# Patient Record
Sex: MALE | Race: WHITE | Employment: OTHER | URBAN - METROPOLITAN AREA
[De-identification: names, ages, dates, MRNs, and addresses within clinical notes are randomized per-mention and may not be internally consistent; named-entity substitution may affect disease eponyms.]

---

## 2017-01-05 RX ORDER — GABAPENTIN 300 MG/1
CAPSULE ORAL
Qty: 180 CAP | Refills: 1 | Status: SHIPPED | OUTPATIENT
Start: 2017-01-05 | End: 2017-08-03 | Stop reason: SDUPTHER

## 2017-01-05 NOTE — TELEPHONE ENCOUNTER
Wife calling, says [facility] says they need to talk to us before they can fill. Please call them. Wife says it is for Mr Sommers and her also.

## 2017-03-03 ENCOUNTER — TELEPHONE (OUTPATIENT)
Dept: INTERNAL MEDICINE CLINIC | Age: 82
End: 2017-03-03

## 2017-03-03 RX ORDER — METHYLPREDNISOLONE 4 MG/1
4 TABLET ORAL
Qty: 1 DOSE PACK | Refills: 0 | Status: SHIPPED | OUTPATIENT
Start: 2017-03-03 | End: 2017-05-04 | Stop reason: ALTCHOICE

## 2017-03-03 NOTE — TELEPHONE ENCOUNTER
Spoke with patient, he stated he was moving stuff last week and injured his lower back. He is having lower back pain, has been using Advil. It was very difficult to get symptoms out of him, he just wanted to tell me that he has been seeing this chiropractor a long time and trusts what he says. Stated he saw his chiropractor and told him the Advil wasn't really helping, this is when the chiropractor recommended a medrol dose pack to him and said he should call us.

## 2017-03-03 NOTE — TELEPHONE ENCOUNTER
PT saw his chiropractor( DR Mariajose Panda # [phone]) today for his back- he recommended a Medrol Dose pack- can you prescribe this for him? [facility] on [address].

## 2017-03-07 ENCOUNTER — OFFICE VISIT (OUTPATIENT)
Dept: INTERNAL MEDICINE CLINIC | Age: 82
End: 2017-03-07

## 2017-03-07 ENCOUNTER — TELEPHONE (OUTPATIENT)
Dept: INTERNAL MEDICINE CLINIC | Age: 82
End: 2017-03-07

## 2017-03-07 VITALS
WEIGHT: 171 LBS | SYSTOLIC BLOOD PRESSURE: 128 MMHG | TEMPERATURE: 97.6 F | OXYGEN SATURATION: 99 % | HEART RATE: 66 BPM | BODY MASS INDEX: 23.85 KG/M2 | DIASTOLIC BLOOD PRESSURE: 78 MMHG

## 2017-03-07 DIAGNOSIS — K40.90 RIGHT INGUINAL HERNIA: Primary | ICD-10-CM

## 2017-03-07 DIAGNOSIS — M54.50 BILATERAL LOW BACK PAIN WITHOUT SCIATICA, UNSPECIFIED CHRONICITY: ICD-10-CM

## 2017-03-07 NOTE — MR AVS SNAPSHOT
Visit Information Date & Time Provider Department Dept. Phone Encounter #  
 3/7/2017 11:30 AM Prabha Souza Internist of [address] [place] [address] [phone] Your Appointments 9/5/2017  8:05 AM  
LAB with [facility] NURSE VISIT Internist of [facility] Appt Note: ov 1yr sarris; ov 1yr sarris [address], [facility] [address] [address] [place]  
  
   
 [address], [address] 9/12/2017  9:00 AM  
PHYSICAL with Ed MD James  
[facility] of [facility] Appt Note: rpe bs  
 [address] [facility], [facility] [address] [address] [place]  
  
   
 [address], [address] Upcoming Health Maintenance Date Due ZOSTER VACCINE AGE 60> 1/31/1990 GLAUCOMA SCREENING Q2Y 1/31/1995 DTaP/Tdap/Td series (1 - Tdap) 1/2/2005 INFLUENZA AGE 9 TO ADULT 8/1/2016 MEDICARE YEARLY EXAM 10/26/2016 Allergies as of 3/7/2017  Review Complete On: 3/7/2017 By: Kelley Waterman LPN Severity Noted Reaction Type Reactions Sulfa (Sulfonamide Antibiotics)   Side Effect Nausea Only Current Immunizations  Reviewed on 9/8/2015 Name Date Influenza High Dose Vaccine PF 10/26/2015  2:00 PM, 9/30/2014 Influenza Vaccine PF 10/22/2013 Influenza Vaccine Split 10/23/2012,  Incomplete Influenza Vaccine Whole 11/12/2010 Pneumococcal Conjugate (PCV-13) 9/8/2015  8:15 AM  
 Pneumococcal Vaccine (Unspecified Type) 1/1/2006 Td 1/1/2005 Not reviewed this visit Vitals BP Pulse Temp Weight(growth percentile) SpO2 BMI  
 128/78 66 97.6 °F (36.4 °C) (Oral) 171 lb (77.6 kg) 99% 23.85 kg/m2 Smoking Status Never Smoker Vitals History BMI and BSA Data Body Mass Index Body Surface Area  
 23.85 kg/m 2 1.97 m 2 Preferred Pharmacy Pharmacy Name Phone [facility]. Pasquale Blue, [address] [facility] [phone] Your Updated Medication List  
  
   
This list is accurate as of: 3/7/17 12:18 PM.  Always use your most recent med list. ADVIL 200 mg tablet Generic drug:  ibuprofen Take 400 mg by mouth every eight (8) hours as needed for Pain. FISH OIL 1,000 mg Cap Generic drug:  omega-3 fatty acids-vitamin e Take 1 Cap by mouth.  
  
 gabapentin 300 mg capsule Commonly known as:  NEURONTIN  
TAKE 1 CAPSULE TWICE A DAY  
  
 GLUCOSAMINE CHONDROITIN PLUS PO Take  by mouth.  
  
 methylPREDNISolone 4 mg tablet Commonly known as:  Henson Kaley Take 1 Tab by mouth Specific Days and Specific Times. Take as directed VITAMIN B-12 1,000 mcg tablet Generic drug:  cyanocobalamin Take 1,000 mcg by mouth daily. VITAMIN D3 1,000 unit Cap Generic drug:  cholecalciferol Take 2,000 Units by mouth. Introducing [facility] & HEALTH SERVICES! Dear Joanne Rodrigues: Thank you for requesting a [employer] account. Our records indicate that you already have an active [employer] account. You can access your account anytime at [email or web address] Did you know that you can access your hospital and ER discharge instructions at any time in [employer]? You can also review all of your test results from your hospital stay or ER visit. Additional Information If you have questions, please visit the Frequently Asked Questions section of the [employer] website at [email or web address] Remember, [employer] is NOT to be used for urgent needs. For medical emergencies, dial 911. Now available from your iPhone and Android! Please provide this summary of care documentation to your next provider. Your primary care clinician is listed as Tabatha Rolle. If you have any questions after today's visit, please call [phone].

## 2017-03-07 NOTE — PROGRESS NOTES
HPI/History  Makeda Avila is a 80 y.o.  male who presents for evaluation. Pt with hx of right inguinal hernia and s/p repair of left inguinal hernia. Right hernia usually without symptoms or rare if present. Noticed some slight discomfort for about a week but no other sxs. Seems to have improved but may now be slightly worse at night but asymptomatic in the day. No signs of strangulation. No other GI sxs. No fevers or constitutional sxs. Left side asymptomatic since repair. Pt also with hx of chronic intermittent back pain. Noticed a flare after house moving about 2 wks ago. Has seen his chiropractor and was helping but then flared during one of the sessions. Pt denies any radiation into the LEs. Denies incontinence, paresthesias, or other sxs. No urinary sxs suggesting stone/other. Pt started medrol dosepack ~3/4. Has taken tylenol and goodies prn. No other complaints. Patient Active Problem List   Diagnosis Code    Impotence of organic origin N52.9    Right bundle branch block I45.10    Colon polyp K63.5    Osteoporosis s/p compression fx L4, fx wrist  M81.0    Nephrolithiasis N20.0    Inguinal hernia, right K40.90    Microhematuria R31.29    Compression fracture of L4 lumbar vertebra ([address] S32.040A    Fracture of wrist, Right S62.109A    Left lower lobe pneumonia J18.9    Low back pain with left-sided sciatica M54.42     Past Medical History:   Diagnosis Date    B12 deficiency     Compression fracture of fourth lumbar vertebra (HCC)     Degenerative joint disease (DJD) of lumbar spine     Distal radial fracture, right wrist 02/2015    Fell on ice. s/p ORIF with right dorsal endoplate nail, two proximal screws, and four distal pegs. Dr. Dean Ibrahim.  Diverticulosis     Impotence of organic origin     Low back pain with left-sided sciatica     Nephrolithiasis     Osteoporosis     Treated with ibandronate for 4 years, completed 5/2013.      Proctalgia fugax     RBBB (right bundle branch block)      Past Surgical History:   Procedure Laterality Date    HX CATARACT REMOVAL      HX HERNIA REPAIR      left side     Social History     Social History    Marital status:      Spouse name: N/A    Number of children: N/A    Years of education: N/A     Occupational History    Not on file. Social History Main Topics    Smoking status: Never Smoker    Smokeless tobacco: Never Used    Alcohol use No    Drug use: No    Sexual activity: Not on file     Other Topics Concern    Not on file     Social History Narrative     History reviewed. No pertinent family history. Current Outpatient Prescriptions   Medication Sig    methylPREDNISolone (MEDROL DOSEPACK) 4 mg tablet Take 1 Tab by mouth Specific Days and Specific Times. Take as directed    gabapentin (NEURONTIN) 300 mg capsule TAKE 1 CAPSULE TWICE A DAY    ibuprofen (ADVIL) 200 mg tablet Take 400 mg by mouth every eight (8) hours as needed for Pain.  GLUC/CHND/OM3/DHA/EPA/FISH/STR (GLUCOSAMINE CHONDROITIN PLUS PO) Take  by mouth.  cyanocobalamin (VITAMIN B-12) 1,000 mcg tablet Take 1,000 mcg by mouth daily.  omega-3 fatty acids-vitamin e (FISH OIL) 1,000 mg Cap Take 1 Cap by mouth.  Cholecalciferol, Vitamin D3, (VITAMIN D) 1,000 unit Cap Take 2,000 Units by mouth. No current facility-administered medications for this visit. Allergies   Allergen Reactions    Sulfa (Sulfonamide Antibiotics) Nausea Only       Review of Systems  Significant findings included in HPI. Physical Examination  Visit Vitals    /78    Pulse 66    Temp 97.6 °F (36.4 °C) (Oral)    Wt 171 lb (77.6 kg)    SpO2 99%    BMI 23.85 kg/m2     General - Alert and in no acute distress. Pt appears well, comfortable, and in good spirits. Pleasant, engaging. Nontoxic. Not anxious, non-diaphoretic. Mental status - Appropriate mood, behavior, speech content, dress, and thought processes.   Pulm - No tachypnea, retractions, or cyanosis. Good respiratory effort. Cardiovascular - Normal rate, regular rhythm. Abdomen - Nondistended. Active bowel sounds. Soft, nontender. No guarding, rigidity, or rebound. Right inguinal hernia noted and bulging with valsalva but reducible and with no signs of strangulation. Nontender and no overlying changes, warmth, or discoloration. No other associated findings. Back - Indicates affected areas as lower paraspinal lumbar region bilat. No tenderness or palpable findings currently. No apparent discomfort with or without ROM. Good ROM. No other findings. Assessment and Plan  1. Right inguinal hernia - Relatively asymptomatic at baseline but mild discomfort for about a week but improving. No current signs of strangulation or other ominous developments but will refer to Dr. Jacob Piedra for eval. Supportive measures for now and return/call or visit ED if acute developments. 2. Low back pain withOUT sciatica - Complete steroid taper. Avoid NSAIDs and ASA for now. Can use tylenol if needed. Discussed stronger analgesics and adverse effects but will hold off on this for as long as possible. Revisited other conservative measures at length. Can continue with chiropractor but avoid the procedures at recent session and have lighter manipulation/tx in general. May consider PT in the future. Further planning pending pt progression. Pt happily agrees with plan. More than 25 mins spent during visit with more than 50% spent discussing above issues, potential causes/contributing factors, eval/tx, med considerations, plan, and questions. PLEASE NOTE:   This document has been produced using voice recognition software. Unrecognized errors in transcription may be present.     [facility] of [address] [place] [address]  [phone]  3/7/2017

## 2017-03-07 NOTE — TELEPHONE ENCOUNTER
Pt here today for ov with Yolande Prince. Asking if there is any way RD would take him on as new pt. Says Dr. Ila Quigley had told him RD would be able to see him. He has seen Dr. Martín Bailey but really just feels more comfortable with a male doctor. He was scheduled in error to see RD in Sept.     Any way you would take him on so he doesn't have to go out of the practice?

## 2017-03-29 ENCOUNTER — OFFICE VISIT (OUTPATIENT)
Dept: SURGERY | Age: 82
End: 2017-03-29

## 2017-03-29 VITALS
DIASTOLIC BLOOD PRESSURE: 70 MMHG | BODY MASS INDEX: 23.94 KG/M2 | HEIGHT: 71 IN | RESPIRATION RATE: 18 BRPM | WEIGHT: 171 LBS | SYSTOLIC BLOOD PRESSURE: 118 MMHG

## 2017-03-29 DIAGNOSIS — K40.91 UNILATERAL RECURRENT INGUINAL HERNIA WITHOUT OBSTRUCTION OR GANGRENE: Primary | ICD-10-CM

## 2017-03-29 NOTE — PROGRESS NOTES
Progress Note    Patient: Mj Joshi  MRN: [id number]  SSN: [id number]   YOB: 1930  Age: 80 y.o. Sex: male     Chief Complaint   Patient presents with    Inguinal Hernia       HPI    Mr Lyubov Segal is an 81 y/o man with at least a 10 year hx of a right Ing hernia. This is largely asymptomatic and only rarely hurts. It is mildly symp after heavy work but only a time or two in the last decade. The hernia is small and easy to reduce. We've discussed what to do with it and together concluded that we will wait on any repair until it gets bigger or causes more symps. Past Medical History:   Diagnosis Date    B12 deficiency     Compression fracture of fourth lumbar vertebra (HCC)     Degenerative joint disease (DJD) of lumbar spine     Distal radial fracture, right wrist 02/2015    Fell on ice. s/p ORIF with right dorsal endoplate nail, two proximal screws, and four distal pegs. Dr. Victoria Mathur.  Diverticulosis     Impotence of organic origin     Low back pain with left-sided sciatica     Nephrolithiasis     Osteoporosis     Treated with ibandronate for 4 years, completed 5/2013.  Proctalgia fugax     RBBB (right bundle branch block)      Past Surgical History:   Procedure Laterality Date    HX CATARACT REMOVAL      HX HERNIA REPAIR      left side     Allergies   Allergen Reactions    Sulfa (Sulfonamide Antibiotics) Nausea Only     Current Outpatient Prescriptions   Medication Sig Dispense Refill    methylPREDNISolone (MEDROL DOSEPACK) 4 mg tablet Take 1 Tab by mouth Specific Days and Specific Times. Take as directed 1 Dose Pack 0    gabapentin (NEURONTIN) 300 mg capsule TAKE 1 CAPSULE TWICE A  Cap 1    ibuprofen (ADVIL) 200 mg tablet Take 400 mg by mouth every eight (8) hours as needed for Pain.  GLUC/CHND/OM3/DHA/EPA/FISH/STR (GLUCOSAMINE CHONDROITIN PLUS PO) Take  by mouth.  cyanocobalamin (VITAMIN B-12) 1,000 mcg tablet Take 1,000 mcg by mouth daily.         omega-3 fatty acids-vitamin e (FISH OIL) 1,000 mg Cap Take 1 Cap by mouth.  Cholecalciferol, Vitamin D3, (VITAMIN D) 1,000 unit Cap Take 2,000 Units by mouth. Social History     Social History    Marital status:      Spouse name: N/A    Number of children: N/A    Years of education: N/A     Occupational History    Not on file. Social History Main Topics    Smoking status: Never Smoker    Smokeless tobacco: Never Used    Alcohol use No    Drug use: No    Sexual activity: Not on file     Other Topics Concern    Not on file     Social History Narrative     History reviewed. No pertinent family history. Review of systems:  Patient denies any reflux, emesis, abdominal pain, change in bowel habits, hematochezia, melena, fever, weight loss, fatigue chills, dermatitis, abnormal moles, change in vision, vertigo, epistaxis, dysphagia, hoarseness, chest pain, palpitations, hypertension, edema, cough, shortness of breath, wheezing, hemoptysis, snoring, hematuria, diabetes, thyroid disease, anemia, bruising, history of blood transfusion, dizziness, headache, or fainting.     Physical Examination    Well developed well nourished male in no apparent distress  Visit Vitals    /70    Resp 18    Ht 5' 11\" (1.803 m)    Wt 77.6 kg (171 lb)    BMI 23.85 kg/m2      Head: normocephalic, atraumatic  Mouth: Clear, no overt lesions, oral mucosa pink and moist  Neck: supple, no masses, no adenopathy or carotid bruits, trachea midline  Resp: clear to auscultation bilaterally, no wheeze, rhonchi or rales, excursions normal and symmetrical  Cardio: Regular rate and rhythm, no murmurs, clicks, gallops or rubs, no edema or varicosities  Abdomen: soft, nontender, nondistended, normoactive bowel sounds, no hernias, no hepatosplenomegaly,   Back: na  Extremeties: warm, well-perfused, no tenderness or swelling, normal gait/station  Neuro: sensation and strength grossly intact and symmetrical  Psych: alert and oriented to person, place and time  Breast exam na    IMPRESSION  Small largly asymptomatic [facility]    PLAN  No orders of the defined types were placed in this encounter.     F/u 6 months  Melba Cardona MD

## 2017-03-29 NOTE — MR AVS SNAPSHOT
Visit Information Date & Time Provider Department Dept. Phone Encounter #  
 3/29/2017 10:00 AM Royal Suarez MD [facility] [phone] [id number] Your Appointments 9/5/2017  8:05 AM  
LAB with [facility] NURSE VISIT Internist of [facility] ([facility]) Appt Note: ov 1yr sarris; ov 1yr sarris [address], [address] [facility] [address] [address] [place]  
  
   
 [address], [address] 9/12/2017  9:00 AM  
PHYSICAL with Kam Méndez MD  
Internist of [facility]) Appt Note: rpe bs  
 [address] [facility], [address] [facility] [address] [place]  
  
   
 [address], [address]  
  
    
 9/29/2017 10:00 AM  
Follow Up with Royal Suarez MD  
[address] ([facility]) Appt Note: 6 month fu hernia [address] [facility] [address]  
[phone]  
  
   
 [address] [address] Upcoming Health Maintenance Date Due ZOSTER VACCINE AGE 60> 1/31/1990 GLAUCOMA SCREENING Q2Y 1/31/1995 DTaP/Tdap/Td series (1 - Tdap) 1/2/2005 INFLUENZA AGE 9 TO ADULT 8/1/2016 MEDICARE YEARLY EXAM 10/26/2016 Allergies as of 3/29/2017  Review Complete On: 3/29/2017 By: Elijah Bah LPN Severity Noted Reaction Type Reactions Sulfa (Sulfonamide Antibiotics)   Side Effect Nausea Only Current Immunizations  Reviewed on 9/8/2015 Name Date Influenza High Dose Vaccine PF 10/26/2015  2:00 PM, 9/30/2014 Influenza Vaccine PF 10/22/2013 Influenza Vaccine Split 10/23/2012,  Incomplete Influenza Vaccine Whole 11/12/2010 Pneumococcal Conjugate (PCV-13) 9/8/2015  8:15 AM  
 Pneumococcal Vaccine (Unspecified Type) 1/1/2006 Td 1/1/2005 Not reviewed this visit Vitals BP Resp Height(growth percentile) Weight(growth percentile) BMI Smoking Status 118/70 18 5' 11\" (1.803 m) 171 lb (77.6 kg) 23.85 kg/m2 Never Smoker BMI and BSA Data Body Mass Index Body Surface Area  
 23.85 kg/m 2 1.97 m 2 Preferred Pharmacy [facility] Phone [id number] [facility], [address] [facility] [phone] Your Updated Medication List  
  
   
This list is accurate as of: 3/29/17 10:45 AM.  Always use your most recent med list. ADVIL 200 mg tablet Generic drug:  ibuprofen Take 400 mg by mouth every eight (8) hours as needed for Pain. FISH OIL 1,000 mg Cap Generic drug:  omega-3 fatty acids-vitamin e Take 1 Cap by mouth.  
  
 gabapentin 300 mg capsule Commonly known as:  NEURONTIN  
TAKE 1 CAPSULE TWICE A DAY  
  
 GLUCOSAMINE CHONDROITIN PLUS PO Take  by mouth.  
  
 methylPREDNISolone 4 mg tablet Commonly known as:  Henson Kaley Take 1 Tab by mouth Specific Days and Specific Times. Take as directed VITAMIN B-12 1,000 mcg tablet Generic drug:  cyanocobalamin Take 1,000 mcg by mouth daily. VITAMIN D3 1,000 unit Cap Generic drug:  cholecalciferol Take 2,000 Units by mouth. Patient Instructions Call the office at [phone] if you have any questions or concerns. Introducing [facility] & HEALTH SERVICES! Deaedward Rodrigues: Thank you for requesting a [employer] account. Our records indicate that you already have an active [employer] account. You can access your account anytime at [email or web address] Did you know that you can access your hospital and ER discharge instructions at any time in [employer]? You can also review all of your test results from your hospital stay or ER visit. Additional Information If you have questions, please visit the Frequently Asked Questions section of the [employer] website at [email or web address]/. Remember, [employer] is NOT to be used for urgent needs. For medical emergencies, dial 911. Now available from your iPhone and Android! Please provide this summary of care documentation to your next provider. Your primary care clinician is listed as Ry Avila. If you have any questions after today's visit, please call [phone].

## 2017-05-04 ENCOUNTER — OFFICE VISIT (OUTPATIENT)
Dept: INTERNAL MEDICINE CLINIC | Age: 82
End: 2017-05-04

## 2017-05-04 VITALS
BODY MASS INDEX: 23.94 KG/M2 | DIASTOLIC BLOOD PRESSURE: 62 MMHG | HEART RATE: 60 BPM | HEIGHT: 71 IN | WEIGHT: 171 LBS | SYSTOLIC BLOOD PRESSURE: 96 MMHG | TEMPERATURE: 98.2 F | OXYGEN SATURATION: 97 %

## 2017-05-04 DIAGNOSIS — R42 DIZZINESS: Primary | ICD-10-CM

## 2017-05-04 RX ORDER — MECLIZINE HYDROCHLORIDE 25 MG/1
25 TABLET ORAL
Qty: 30 TAB | Refills: 0 | Status: SHIPPED | OUTPATIENT
Start: 2017-05-04 | End: 2020-07-02 | Stop reason: SDUPTHER

## 2017-05-04 NOTE — PROGRESS NOTES
1. Have you been to the ER, urgent care clinic or hospitalized since your last visit? NO.     2. Have you seen or consulted any other health care providers outside of the [facility] since your last visit (Include any pap smears or colon screening)? NO      Do you have an Advanced Directive? NO    Would you like information on Advanced Directives?  YES

## 2017-05-04 NOTE — MR AVS SNAPSHOT
Visit Information Date & Time Provider Department Dept. Phone Encounter #  
 5/4/2017  2:30 PM Stefanie Cantu MD Internist of [address] [place] [address] [phone] [id number] Your Appointments 9/5/2017  8:05 AM  
LAB with [facility] NURSE VISIT Internist of [facility] ([facility]) Appt Note: ov 1yr sarris; ov 1yr sarris [address], [address] [facility] [address]  
  
   
 [address], [address] 9/12/2017  9:00 AM  
PHYSICAL with Stefanie Cantu MD  
Internist of [facility]) Appt Note: rpe bs  
 [address] [facility], [address] [facility] [address]  
  
   
 [address], [address]  
  
    
 9/29/2017 10:00 AM  
Follow Up with Rachele Macias MD  
[address] ([facility]) Appt Note: 6 month fu hernia [address] [facility] [address]  
[phone]  
  
   
 [address] [address] Upcoming Health Maintenance Date Due ZOSTER VACCINE AGE 60> 1/31/1990 GLAUCOMA SCREENING Q2Y 1/31/1995 DTaP/Tdap/Td series (1 - Tdap) 1/2/2005 MEDICARE YEARLY EXAM 10/26/2016 INFLUENZA AGE 9 TO ADULT 8/1/2017 Allergies as of 5/4/2017  Review Complete On: 3/29/2017 By: Rachele Macias MD  
  
 Severity Noted Reaction Type Reactions Sulfa (Sulfonamide Antibiotics)   Side Effect Nausea Only Current Immunizations  Reviewed on 9/8/2015 Name Date Influenza High Dose Vaccine PF 10/26/2015  2:00 PM, 9/30/2014 Influenza Vaccine PF 10/22/2013 Influenza Vaccine Split 10/23/2012,  Incomplete Influenza Vaccine Whole 11/12/2010 Pneumococcal Conjugate (PCV-13) 9/8/2015  8:15 AM  
 Pneumococcal Vaccine (Unspecified Type) 1/1/2006 Td 1/1/2005 Not reviewed this visit You Were Diagnosed With   
  
 Codes Comments  Dizziness    -  Primary ICD-10-CM: L85 
 ICD-9-CM: 780.4 Vitals BP Pulse Temp Height(growth percentile) Weight(growth percentile) SpO2  
 96/62 60 98.2 °F (36.8 °C) (Oral) 5' 11\" (1.803 m) 171 lb (77.6 kg) 97% BMI Smoking Status 23.85 kg/m2 Never Smoker Vitals History BMI and BSA Data Body Mass Index Body Surface Area  
 23.85 kg/m 2 1.97 m 2 Preferred Pharmacy Pharmacy Name Phone [facility] [address] [facility] [phone] Your Updated Medication List  
  
   
This list is accurate as of: 5/4/17  3:20 PM.  Always use your most recent med list. ADVIL 200 mg tablet Generic drug:  ibuprofen Take 400 mg by mouth every eight (8) hours as needed for Pain. FISH OIL 1,000 mg Cap Generic drug:  omega-3 fatty acids-vitamin e Take 1 Cap by mouth.  
  
 gabapentin 300 mg capsule Commonly known as:  NEURONTIN  
TAKE 1 CAPSULE TWICE A DAY  
  
 GLUCOSAMINE CHONDROITIN PLUS PO Take  by mouth.  
  
 meclizine 25 mg tablet Commonly known as:  ANTIVERT Take 1 Tab by mouth three (3) times daily as needed. VITAMIN B-12 1,000 mcg tablet Generic drug:  cyanocobalamin Take 1,000 mcg by mouth daily. VITAMIN D3 1,000 unit Cap Generic drug:  cholecalciferol Take 2,000 Units by mouth. Prescriptions Sent to Pharmacy Refills  
 meclizine (ANTIVERT) 25 mg tablet 0 Sig: Take 1 Tab by mouth three (3) times daily as needed. Class: Normal  
 Pharmacy: [id number] Ph #: [phone] Route: Oral  
  
Introducing [facility] [place] [facility]! Dear Gelacio Lares: Thank you for requesting a [employer] account. Our records indicate that you already have an active [employer] account. You can access your account anytime at [email or web address]. [email or web address] Did you know that you can access your hospital and ER discharge instructions at any time in [employer]? You can also review all of your test results from your hospital stay or ER visit. Additional Information If you have questions, please visit the Frequently Asked Questions section of the [employer] website at [email or web address] Remember, [employer] is NOT to be used for urgent needs. For medical emergencies, dial 911. Now available from your iPhone and Android! Please provide this summary of care documentation to your next provider. Your primary care clinician is listed as Kaitlyn Jamil. If you have any questions after today's visit, please call [phone].

## 2017-05-04 NOTE — PROGRESS NOTES
80 y.o. WHITE OR  male who presents for evaluation. He is here c/o about a 1 month h/o intermittent dizziness. Happens mostly when he's getting in or out of bed, describes it as a spinning sensation mostly w mild nausea without actual vomiting. Does not recall prior h/o vertigo, this happened a few days after he saw Dr Sam Ash for was removal apparently. No new hearing loss or tinnitus although he has baseline level for both, no ear pain. Denied any focal neuro complaints. Denied any cp, sob, palpitations, syncope, presyncope    Past Medical History:   Diagnosis Date    B12 deficiency     Closed compression fracture of L4 lumbar vertebra (HCC)     Colon polyps     Dr Maya Boyd    Degenerative joint disease (DJD) of lumbar spine     MRI 11/14 w multilevel disease; chronic lbp w sciatica saw Dr Carri Baltazar    Distal radial fracture, right wrist 02/2015    Fell on ice. s/p ORIF with right dorsal endoplate nail, two proximal screws, and four distal pegs. Dr. Rashaad Goins.  Diverticulosis     on CT 4/16    Erectile dysfunction     Nephrolithiasis 04/2016    right tiny stone on CT; microhematuria    Osteoporosis     ibandronate 2009-5/13;  DEXA t score -2.4 spibe, -2.1 hip (8/11); -2.3 spine, -1.9 hip (8/13); -2.4 spine, -2.4 hip (9/15)    Proctalgia fugax     RBBB (right bundle branch block)      Past Surgical History:   Procedure Laterality Date    HX CATARACT REMOVAL      HX COLONOSCOPY      Dr Maya Boyd 2012 negative    HX HERNIA REPAIR      left side     Social History     Social History    Marital status:      Spouse name: N/A    Number of children: N/A    Years of education: N/A     Occupational History    Not on file.      Social History Main Topics    Smoking status: Never Smoker    Smokeless tobacco: Never Used    Alcohol use No    Drug use: No    Sexual activity: Not on file     Other Topics Concern    Not on file     Social History Narrative     Current Outpatient Prescriptions   Medication Sig    meclizine (ANTIVERT) 25 mg tablet Take 1 Tab by mouth three (3) times daily as needed.  gabapentin (NEURONTIN) 300 mg capsule TAKE 1 CAPSULE TWICE A DAY    ibuprofen (ADVIL) 200 mg tablet Take 400 mg by mouth every eight (8) hours as needed for Pain.  GLUC/CHND/OM3/DHA/EPA/FISH/STR (GLUCOSAMINE CHONDROITIN PLUS PO) Take  by mouth.  cyanocobalamin (VITAMIN B-12) 1,000 mcg tablet Take 1,000 mcg by mouth daily.  omega-3 fatty acids-vitamin e (FISH OIL) 1,000 mg Cap Take 1 Cap by mouth.  Cholecalciferol, Vitamin D3, (VITAMIN D) 1,000 unit Cap Take 2,000 Units by mouth. No current facility-administered medications for this visit. Allergies   Allergen Reactions    Sulfa (Sulfonamide Antibiotics) Nausea Only     Visit Vitals    BP 96/62    Pulse 60    Temp 98.2 °F (36.8 °C) (Oral)    Ht 5' 11\" (1.803 m)    Wt 171 lb (77.6 kg)    SpO2 97%    BMI 23.85 kg/m2   tm wnl, sinuses nt, op benign, no nodes. No bruits heard. Heart showed rrr, lungs cta, abd soft and nt. Neuro nonfocal.  Tony-hallpike negative    LABS  From 9/14 showed gluc 90,   cr 1.43, gfr 47,  alt<5, chol 149, tg 56, hdl 59, ldl-c 79, wbc 5.7, hb 13.2, plt 170, ua tr bl, vit d 46.4  From 2/15 showed gluc 114, cr 1.06, gfr>60, alt 17,        wbc 6.9, hb 12.9, plt 187  From 9/15 showed gluc 89,   cr 0.93, gfr 75,  alt 13, chol 165, tg 92, hdl 66, ldl-c 81, wbc 5.8, hb 14.0, plt 198, ua tr bl, vit d 47.3, b12 486  From 8/16 showed gluc 148, cr 1.16, gfr 60,  alt 20,                 ck/trop neg  From 8/16 showed gluc 80,   cr 0.97, gfr>60, alt 28, chol 118, tg 71, hdl 46, ldl-c 58, wbc 5.7, hb 12.5, plt 341, ua tr bl, vit d 40.9, tsh 1.04    Assessment and plan:  1. Probable vertigo. Quick discussion. Suggested prn antivert, Dr Linsey Waters if no better or worsening sx        Above conditions discussed at length and patient vocalized understanding.   All questions answered to patient satifaction

## 2017-08-03 RX ORDER — GABAPENTIN 300 MG/1
CAPSULE ORAL
Qty: 180 CAP | Refills: 1 | Status: SHIPPED | OUTPATIENT
Start: 2017-08-03 | End: 2018-01-08 | Stop reason: SDUPTHER

## 2017-09-05 ENCOUNTER — HOSPITAL ENCOUNTER (OUTPATIENT)
Dept: LAB | Age: 82
Discharge: HOME | End: 2017-09-05
Payer: MEDICARE

## 2017-09-05 DIAGNOSIS — K40.90 UNILATERAL INGUINAL HERNIA WITHOUT OBSTRUCTION OR GANGRENE, RECURRENCE NOT SPECIFIED: ICD-10-CM

## 2017-09-05 DIAGNOSIS — M54.42 CHRONIC BILATERAL LOW BACK PAIN WITH LEFT-SIDED SCIATICA: ICD-10-CM

## 2017-09-05 DIAGNOSIS — M81.0 OSTEOPOROSIS: ICD-10-CM

## 2017-09-05 DIAGNOSIS — G89.29 CHRONIC BILATERAL LOW BACK PAIN WITH LEFT-SIDED SCIATICA: ICD-10-CM

## 2017-09-05 DIAGNOSIS — I45.10 RIGHT BUNDLE BRANCH BLOCK: ICD-10-CM

## 2017-09-05 DIAGNOSIS — J18.9 PNEUMONIA OF LEFT LOWER LOBE DUE TO INFECTIOUS ORGANISM: ICD-10-CM

## 2017-09-05 LAB
ALBUMIN SERPL-MCNC: 3.8 G/DL (ref 3.4–5)
ALBUMIN/GLOB SERPL: 1.3 {RATIO} (ref 0.8–1.7)
ALP SERPL-CCNC: 69 U/L (ref 45–117)
ALT SERPL-CCNC: 20 U/L (ref 16–61)
ANION GAP SERPL CALC-SCNC: 6 MMOL/L (ref 3–18)
APPEARANCE UR: CLEAR
AST SERPL-CCNC: 17 U/L (ref 15–37)
BACTERIA URNS QL MICRO: NEGATIVE /HPF
BASOPHILS # BLD: 0 K/UL (ref 0–0.06)
BASOPHILS NFR BLD: 0 % (ref 0–2)
BILIRUB SERPL-MCNC: 0.5 MG/DL (ref 0.2–1)
BILIRUB UR QL: NEGATIVE
BUN SERPL-MCNC: 16 MG/DL (ref 7–18)
BUN/CREAT SERPL: 16 (ref 12–20)
CALCIUM SERPL-MCNC: 8.9 MG/DL (ref 8.5–10.1)
CHLORIDE SERPL-SCNC: 106 MMOL/L (ref 100–108)
CHOLEST SERPL-MCNC: 152 MG/DL
CO2 SERPL-SCNC: 29 MMOL/L (ref 21–32)
COLOR UR: YELLOW
CREAT SERPL-MCNC: 1.01 MG/DL (ref 0.6–1.3)
DIFFERENTIAL METHOD BLD: ABNORMAL
EOSINOPHIL # BLD: 0.1 K/UL (ref 0–0.4)
EOSINOPHIL NFR BLD: 2 % (ref 0–5)
EPITH CASTS URNS QL MICRO: NORMAL /LPF (ref 0–5)
ERYTHROCYTE [DISTWIDTH] IN BLOOD BY AUTOMATED COUNT: 14.4 % (ref 11.6–14.5)
GLOBULIN SER CALC-MCNC: 2.9 G/DL (ref 2–4)
GLUCOSE SERPL-MCNC: 89 MG/DL (ref 74–99)
GLUCOSE UR STRIP.AUTO-MCNC: NEGATIVE MG/DL
HCT VFR BLD AUTO: 42.5 % (ref 36–48)
HDLC SERPL-MCNC: 74 MG/DL (ref 40–60)
HDLC SERPL: 2.1 {RATIO} (ref 0–5)
HGB BLD-MCNC: 13.7 G/DL (ref 13–16)
HGB UR QL STRIP: NEGATIVE
KETONES UR QL STRIP.AUTO: NEGATIVE MG/DL
LDLC SERPL CALC-MCNC: 62.8 MG/DL (ref 0–100)
LEUKOCYTE ESTERASE UR QL STRIP.AUTO: NEGATIVE
LIPID PROFILE,FLP: ABNORMAL
LYMPHOCYTES # BLD: 2.5 K/UL (ref 0.9–3.6)
LYMPHOCYTES NFR BLD: 45 % (ref 21–52)
MCH RBC QN AUTO: 30.6 PG (ref 24–34)
MCHC RBC AUTO-ENTMCNC: 32.2 G/DL (ref 31–37)
MCV RBC AUTO: 95.1 FL (ref 74–97)
MONOCYTES # BLD: 0.7 K/UL (ref 0.05–1.2)
MONOCYTES NFR BLD: 13 % (ref 3–10)
NEUTS SEG # BLD: 2.2 K/UL (ref 1.8–8)
NEUTS SEG NFR BLD: 40 % (ref 40–73)
NITRITE UR QL STRIP.AUTO: NEGATIVE
PH UR STRIP: 7.5 [PH] (ref 5–8)
PLATELET # BLD AUTO: 195 K/UL (ref 135–420)
PMV BLD AUTO: 10.2 FL (ref 9.2–11.8)
POTASSIUM SERPL-SCNC: 4.5 MMOL/L (ref 3.5–5.5)
PROT SERPL-MCNC: 6.7 G/DL (ref 6.4–8.2)
PROT UR STRIP-MCNC: NEGATIVE MG/DL
RBC # BLD AUTO: 4.47 M/UL (ref 4.7–5.5)
RBC #/AREA URNS HPF: 0 /HPF (ref 0–5)
SODIUM SERPL-SCNC: 141 MMOL/L (ref 136–145)
SP GR UR REFRACTOMETRY: 1.01 (ref 1–1.03)
TRIGL SERPL-MCNC: 76 MG/DL (ref ?–150)
TSH SERPL DL<=0.05 MIU/L-ACNC: 1.32 UIU/ML (ref 0.36–3.74)
UROBILINOGEN UR QL STRIP.AUTO: 0.2 EU/DL (ref 0.2–1)
VLDLC SERPL CALC-MCNC: 15.2 MG/DL
WBC # BLD AUTO: 5.5 K/UL (ref 4.6–13.2)
WBC URNS QL MICRO: NORMAL /HPF (ref 0–4)

## 2017-09-05 PROCEDURE — 36415 COLL VENOUS BLD VENIPUNCTURE: CPT | Performed by: INTERNAL MEDICINE

## 2017-09-05 PROCEDURE — 82306 VITAMIN D 25 HYDROXY: CPT | Performed by: INTERNAL MEDICINE

## 2017-09-05 PROCEDURE — 85025 COMPLETE CBC W/AUTO DIFF WBC: CPT | Performed by: INTERNAL MEDICINE

## 2017-09-05 PROCEDURE — 80061 LIPID PANEL: CPT | Performed by: INTERNAL MEDICINE

## 2017-09-05 PROCEDURE — 84443 ASSAY THYROID STIM HORMONE: CPT | Performed by: INTERNAL MEDICINE

## 2017-09-05 PROCEDURE — 80053 COMPREHEN METABOLIC PANEL: CPT | Performed by: INTERNAL MEDICINE

## 2017-09-05 PROCEDURE — 81001 URINALYSIS AUTO W/SCOPE: CPT | Performed by: INTERNAL MEDICINE

## 2017-09-06 ENCOUNTER — TELEPHONE (OUTPATIENT)
Dept: INTERNAL MEDICINE CLINIC | Age: 82
End: 2017-09-06

## 2017-09-06 LAB — 25(OH)D3 SERPL-MCNC: 52 NG/ML (ref 30–100)

## 2017-09-06 NOTE — TELEPHONE ENCOUNTER
Leticia Singer at [facility] lab is calling. Medicare will not pay for Lipids with current dx. Asking if there is another Dx code we can use.

## 2017-09-07 NOTE — TELEPHONE ENCOUNTER
Probably not for medicare. .. Linda Sher Is there anything abnormal about his lipid panel or family hx cardiac issues. .etc?

## 2017-09-07 NOTE — TELEPHONE ENCOUNTER
Order written and at MR to fax to sonia. ..  Sorangely Guerrero, I have no idea why I sent this to you!! =)

## 2017-09-09 NOTE — PROGRESS NOTES
This is a Subsequent Medicare Annual Wellness Exam     I have reviewed the patient's medical history in detail and updated the computerized patient record. History     Past Medical History:   Diagnosis Date    B12 deficiency     Closed compression fracture of L4 lumbar vertebra (HCC)     Colon polyps     Dr Heidy Morel    Degenerative joint disease (DJD) of lumbar spine     MRI 11/14 w multilevel disease; chronic lbp w sciatica saw Dr Jos Ashley    Distal radial fracture, right wrist 02/2015    Fell on ice. s/p ORIF with right dorsal endoplate nail, two proximal screws, and four distal pegs. Dr. Armstrong Floor.  Diverticulosis     on CT 4/16    Erectile dysfunction     Nephrolithiasis 04/2016    right tiny stone on CT; microhematuria    Osteoporosis     ibandronate 2009-5/13 stopped due to tooth issues per his endodontist;  DEXA t score -2.4 spibe, -2.1 hip (8/11); -2.3 spine, -1.9 hip (8/13); -2.4 spine, -2.4 hip (9/15)    Proctalgia fugax     RBBB (right bundle branch block)     Right inguinal hernia     Dr Patricia Mohr      Past Surgical History:   Procedure Laterality Date    Matheus Hint      Dr Heidy Morel 2012 negative    HX HERNIA REPAIR  2002    [facility] repair     Current Outpatient Prescriptions   Medication Sig Dispense Refill    gabapentin (NEURONTIN) 300 mg capsule TAKE 1 CAPSULE TWICE A  Cap 1    ibuprofen (ADVIL) 200 mg tablet Take 400 mg by mouth every eight (8) hours as needed for Pain.  GLUC/CHND/OM3/DHA/EPA/FISH/STR (GLUCOSAMINE CHONDROITIN PLUS PO) Take  by mouth.  cyanocobalamin (VITAMIN B-12) 1,000 mcg tablet Take 1,000 mcg by mouth daily.  omega-3 fatty acids-vitamin e (FISH OIL) 1,000 mg Cap Take 1 Cap by mouth.  Cholecalciferol, Vitamin D3, (VITAMIN D) 1,000 unit Cap Take 2,000 Units by mouth.  meclizine (ANTIVERT) 25 mg tablet Take 1 Tab by mouth three (3) times daily as needed.  30 Tab 0     Allergies   Allergen Reactions    Sulfa (Sulfonamide Antibiotics) Nausea Only     No family history on file. Social History   Substance Use Topics    Smoking status: Never Smoker    Smokeless tobacco: Never Used    Alcohol use No     Depression Risk Factor Screening:     PHQ over the last two weeks 9/12/2017   Little interest or pleasure in doing things Not at all   Feeling down, depressed or hopeless Not at all   Total Score PHQ 2 0     SCREENINGS  Colonoscopy last done 2012 Dr Roberts Reading  Prostate NINA done  PSA done    Immunization History   Administered Date(s) Administered    Influenza High Dose Vaccine PF 09/30/2014, 10/26/2015    Influenza Vaccine PF 10/22/2013    Influenza Vaccine Split 10/23/2012    Influenza Vaccine Whole 11/12/2010    Pneumococcal Conjugate (PCV-13) 09/08/2015    Pneumococcal Vaccine (Unspecified Type) 01/01/2006    Td 01/01/2005     Alcohol Risk Factor Screening: You do not drink alcohol or very rarely. Functional Ability and Level of Safety:   Hearing Loss  Hearing is good. Activities of Daily Living  The home contains: no safety equipment  Patient does total self care    Fall Risk  Fall Risk Assessment, last 12 mths 9/12/2017   Able to walk? Yes   Fall in past 12 months?  No   Fall with injury? -   Number of falls in past 12 months -   Fall Risk Score -       Abuse Screen  Patient is not abused    Cognitive Screening   Evaluation of Cognitive Function:  Has your family/caregiver stated any concerns about your memory: no  Normal    Patient Care Team   Patient Care Team:  Cara Swann MD as PCP - General (Internal Medicine)  Daniel Patel, RN as Ambulatory Care Navigator (Internal Medicine)  Larita Dakins, RN as Ambulatory Care Navigator (Internal Medicine)    Assessment/Plan   Education and counseling provided:  Are appropriate based on today's review and evaluation  End-of-Life planning (with patient's consent)  Influenza Vaccine  Cardiovascular screening blood test  Diabetes screening test   End of life discussion undertaken. He will work on getting medical directive in place  Prevnar-13 discussed at length  Colonoscopy beyond age      Diagnoses and all orders for this visit:    1. Medicare annual wellness visit, subsequent    2. Advanced directives, counseling/discussion  -     ADVANCE CARE PLANNING FIRST 30 MINS    3. Screening for alcoholism  -     Annual  Alcohol Screen 15 min ()    4. Screening for depression  -     Depression Screen Annual    5. Screen for colon cancer    6. Screening for diabetes mellitus    7. Screening for ischemic heart disease    8. Right foot pain  -     REFERRAL TO PODIATRY    9. Hyperplastic colonic polyp, unspecified part of colon  -     METABOLIC PANEL, COMPREHENSIVE; Future  -     CBC W/O DIFF; Future    10. Age-related osteoporosis without current pathological fracture  -     VITAMIN D, 25 HYDROXY; Future    11. Nephrolithiasis    12. Unilateral recurrent inguinal hernia without obstruction or gangrene    13.  B12 deficiency  -     VITAMIN B12; Future      Health Maintenance Due   Topic Date Due    ZOSTER VACCINE AGE 60>  11/30/1989    GLAUCOMA SCREENING Q2Y  01/31/1995    DTaP/Tdap/Td series (1 - Tdap) 01/02/2005    INFLUENZA AGE 9 TO ADULT  08/01/2017

## 2017-09-09 NOTE — PROGRESS NOTES
80 y.o. WHITE OR  male who presents for evaluation. No cardiovascular complaints. Remains active by doing yardwork, chores, he takes periodic walks also although no other set exercse    He's been having some issues with a bone sticking out in the right foot and is interested in seeing someone for it    No gi or gu issues        Past Medical History:   Diagnosis Date    B12 deficiency     Closed compression fracture of L4 lumbar vertebra (HCC)     Colon polyps     Dr Vincent Epps    Degenerative joint disease (DJD) of lumbar spine     MRI 11/14 w multilevel disease; chronic lbp w sciatica saw Dr Irena Cruz    Distal radial fracture, right wrist 02/2015    Fell on ice. s/p ORIF with right dorsal endoplate nail, two proximal screws, and four distal pegs. Dr. Rufino Sánchez.  Diverticulosis     on CT 4/16    Erectile dysfunction     Nephrolithiasis 04/2016    right tiny stone on CT; microhematuria    Osteoporosis     ibandronate 2009-5/13 stopped due to tooth issues per his endodontist;  DEXA t score -2.4 spibe, -2.1 hip (8/11); -2.3 spine, -1.9 hip (8/13); -2.4 spine, -2.4 hip (9/15)    Proctalgia fugax     RBBB (right bundle branch block)     Right inguinal hernia     Dr Mitzy Bowden     Past Surgical History:   Procedure Laterality Date    Lisy Sides HX COLONOSCOPY      Dr Vincent Epps 2012 negative    HX HERNIA REPAIR  2002    [facility] repair     Social History     Social History    Marital status:      Spouse name: N/A    Number of children: N/A    Years of education: N/A     Occupational History    Not on file. Social History Main Topics    Smoking status: Never Smoker    Smokeless tobacco: Never Used    Alcohol use No    Drug use: No    Sexual activity: Not on file     Other Topics Concern    Not on file     Social History Narrative     No family history on file.     Current Outpatient Prescriptions   Medication Sig    gabapentin (NEURONTIN) 300 mg capsule TAKE 1 CAPSULE TWICE A DAY    ibuprofen (ADVIL) 200 mg tablet Take 400 mg by mouth every eight (8) hours as needed for Pain.  GLUC/CHND/OM3/DHA/EPA/FISH/STR (GLUCOSAMINE CHONDROITIN PLUS PO) Take  by mouth.  cyanocobalamin (VITAMIN B-12) 1,000 mcg tablet Take 1,000 mcg by mouth daily.  omega-3 fatty acids-vitamin e (FISH OIL) 1,000 mg Cap Take 1 Cap by mouth.  Cholecalciferol, Vitamin D3, (VITAMIN D) 1,000 unit Cap Take 2,000 Units by mouth.  meclizine (ANTIVERT) 25 mg tablet Take 1 Tab by mouth three (3) times daily as needed. No current facility-administered medications for this visit.       Allergies   Allergen Reactions    Sulfa (Sulfonamide Antibiotics) Nausea Only     LAST MEDICARE WELLNESS EXAM: 10/26/15, 9/12/17  ACP 9/12/17    REVIEW OF SYSTEMS: colo Dr Grajeda [facility] 2012  Ophtho  no vision change or eye pain  Oral  no mouth pain, tongue or tooth problems  Ears  no hearing loss, ear pain, fullness, no swallowing problems  Cardiac  no CP, PND, orthopnea, edema, palpitations or syncope  Chest  no breast masses  Resp  no wheezing, chronic coughing, dyspnea  GI  no heartburn, nausea, vomiting, change in bowel habits, bleeding, hemorrhoids  Urinary  no dysuria, hematuria, flank pain, urgency, frequency  Genitals  no genital lesions, discharge, masses, ulceration, warts  Ortho  no swelling, dec ROM, myalgias  Derm  no nail abnormalities, rashes, lesions of note, hair loss  Psych  denies any anxiety or depression symptoms, no hallucinations or violent ideation  Constitutional  no wt loss, night sweats, unexplained fevers  Neuro  no focal weakness, numbness, paresthesias, incoordination, ataxia, involuntary movements  Endo - no polyuria, polydipsia, nocturia, hot flashes    Visit Vitals    /72 (BP 1 Location: Left arm, BP Patient Position: Sitting)    Pulse 60    Temp 97.9 °F (36.6 °C) (Oral)    Resp 14    Ht 5' 11\" (1.803 m)    Wt 173 lb 3.2 oz (78.6 kg)    SpO2 98%    BMI 24.16 kg/m2 Affect is appropriate. Mood stable  No apparent distress  HEENT --Anicteric sclerae, tympanic membranes normal,  ear canals normal.  PERRL, EOMI, conjunctiva and lids normal.  Disks were sharp  Sinuses were nontender, turbinates normal, hearing normal.  Oropharynx without  erythema, normal tongue, oral mucosa and tonsils. No thyromegaly, JVD, or bruits. Lungs --Clear to auscultation, normal percussion. Heart --Regular rate and rhythm, no murmurs, rubs, gallops, or clicks. Chest wall --Nontender to palpation. PMI normal.  Abdomen -- Soft and nontender, no hepatosplenomegaly or masses. Prostate  -- no asymmetry, nodularity, tenderness or enlargement  Rectal  -- normal tone, guiaiac negative brown stool  Extremities -- Without cyanosis, clubbing, edema. 2+ pulses equally and bilaterally.     LABS  From 9/14 showed gluc 90,   cr 1.43, gfr 47,  alt<5, chol 149, tg 56, hdl 59, ldl-c 79, wbc 5.7, hb 13.2, plt 170, ua tr bl, vit d 46.4  From 2/15 showed gluc 114, cr 1.06, gfr>60, alt 17,        wbc 6.9, hb 12.9, plt 187  From 9/15 showed gluc 89,   cr 0.93, gfr 75,  alt 13, chol 165, tg 92, hdl 66, ldl-c 81, wbc 5.8, hb 14.0, plt 198, ua tr bl, vit d 47.3, b12 486  From 8/16 showed gluc 148, cr 1.16, gfr 60,  alt 20,                 ck/trop neg  From 8/16 showed gluc 80,   cr 0.97, gfr>60, alt 28, chol 118, tg 71, hdl 46, ldl-c 58, wbc 5.7, hb 12.5, plt 341, ua tr bl, vit d 40.9, tsh 1.04    Results for orders placed or performed during the hospital encounter of 09/05/17   CBC WITH AUTOMATED DIFF   Result Value Ref Range    WBC 5.5 4.6 - 13.2 K/uL    RBC 4.47 (L) 4.70 - 5.50 M/uL    HGB 13.7 13.0 - 16.0 g/dL    HCT 42.5 36.0 - 48.0 %    MCV 95.1 74.0 - 97.0 FL    MCH 30.6 24.0 - 34.0 PG    MCHC 32.2 31.0 - 37.0 g/dL    RDW 14.4 11.6 - 14.5 %    PLATELET 954 623 - 375 K/uL    MPV 10.2 9.2 - 11.8 FL    NEUTROPHILS 40 40 - 73 %    LYMPHOCYTES 45 21 - 52 %    MONOCYTES 13 (H) 3 - 10 %    EOSINOPHILS 2 0 - 5 % BASOPHILS 0 0 - 2 %    ABS. NEUTROPHILS 2.2 1.8 - 8.0 K/UL    ABS. LYMPHOCYTES 2.5 0.9 - 3.6 K/UL    ABS. MONOCYTES 0.7 0.05 - 1.2 K/UL    ABS. EOSINOPHILS 0.1 0.0 - 0.4 K/UL    ABS. BASOPHILS 0.0 0.0 - 0.06 K/UL    DF AUTOMATED     LIPID PANEL   Result Value Ref Range    LIPID PROFILE          Cholesterol, total 152 <200 MG/DL    Triglyceride 76 <150 MG/DL    HDL Cholesterol 74 (H) 40 - 60 MG/DL    LDL, calculated 62.8 0 - 100 MG/DL    VLDL, calculated 15.2 MG/DL    CHOL/HDL Ratio 2.1 0 - 5.0     METABOLIC PANEL, COMPREHENSIVE   Result Value Ref Range    Sodium 141 136 - 145 mmol/L    Potassium 4.5 3.5 - 5.5 mmol/L    Chloride 106 100 - 108 mmol/L    CO2 29 21 - 32 mmol/L    Anion gap 6 3.0 - 18 mmol/L    Glucose 89 74 - 99 mg/dL    BUN 16 7.0 - 18 MG/DL    Creatinine 1.01 0.6 - 1.3 MG/DL    BUN/Creatinine ratio 16 12 - 20      GFR est AA >60 >60 ml/min/1.73m2    GFR est non-AA >60 >60 ml/min/1.73m2    Calcium 8.9 8.5 - 10.1 MG/DL    Bilirubin, total 0.5 0.2 - 1.0 MG/DL    ALT (SGPT) 20 16 - 61 U/L    AST (SGOT) 17 15 - 37 U/L    Alk.  phosphatase 69 45 - 117 U/L    Protein, total 6.7 6.4 - 8.2 g/dL    Albumin 3.8 3.4 - 5.0 g/dL    Globulin 2.9 2.0 - 4.0 g/dL    A-G Ratio 1.3 0.8 - 1.7     TSH 3RD GENERATION   Result Value Ref Range    TSH 1.32 0.36 - 3.74 uIU/mL   URINALYSIS W/MICROSCOPIC   Result Value Ref Range    Color YELLOW      Appearance CLEAR      Specific gravity 1.012 1.005 - 1.030      pH (UA) 7.5 5.0 - 8.0      Protein NEGATIVE  NEG mg/dL    Glucose NEGATIVE  NEG mg/dL    Ketone NEGATIVE  NEG mg/dL    Bilirubin NEGATIVE  NEG      Blood NEGATIVE  NEG      Urobilinogen 0.2 0.2 - 1.0 EU/dL    Nitrites NEGATIVE  NEG      Leukocyte Esterase NEGATIVE  NEG      WBC 0 to 2 0 - 4 /hpf    RBC 0 0 - 5 /hpf    Epithelial cells FEW 0 - 5 /lpf    Bacteria NEGATIVE  NEG /hpf   VITAMIN D, 25 HYDROXY   Result Value Ref Range    Vitamin D 25-Hydroxy 52.0 30 - 100 ng/mL     Patient Active Problem List   Diagnosis Code    Impotence of organic origin N52.9    Right bundle branch block I45.10    Colon polyp K63.5    Osteoporosis s/p compression fx L4, fx wrist  M81.0    Nephrolithiasis N20.0    Inguinal hernia, right Dr Scotty Flowers K40.90    Microhematuria R31.29    Low back pain with left-sided sciatica M54.42     Assessment and plan:  1. Colon polyp. Fiber  2. Osteoporosis. Ca/d, weight bearing exercise. Declined f/u DEXA as currently not interested in any specific treatment  3. Nephrolithiasis. Hydration  4. RIH. F/U Dr Scotty Flowers as scheduled  5. Right foot pain.  sched Dr Julieta Stratton group          RTC 9/18      Above conditions discussed at length and patient vocalized understanding.   All questions answered to patient satifaction

## 2017-09-09 NOTE — PATIENT INSTRUCTIONS

## 2017-09-09 NOTE — ACP (ADVANCE CARE PLANNING)
Advance Care Planning    Advance Care Planning (ACP) Provider Note - Comprehensive     Date of ACP Conversation: 09/12/17  Persons included in Conversation:  patient  Length of ACP Conversation in minutes:  16 minutes    Authorized Decision Maker (if patient is incapable of making informed decisions): This person is: daughter  Other Legally Authorized Decision Maker (e.g. Next of Kin)          General ACP for ALL Patients with Decision Making Capacity:   Importance of advance care planning, including choosing a healthcare agent to communicate patient's healthcare decisions if patient lost the ability to make decisions, such as after a sudden illness or accident  Understanding of the healthcare agent role was assessed and information provided  Exploration of values, goals, and preferences if recovery is not expected, even with continued medical treatment in the event of: Imminent death  Severe, permanent brain injury    Review of Existing Advance Directive:  na    For Serious or Chronic Illness:  Understanding of medical condition    Understanding of CPR, goals and expected outcomes, benefits and burdens discussed.     Interventions Provided:  Recommended communicating the plan and making copies for the healthcare agent, personal physician, and others as appropriate (e.g., health system)  Recommended review of completed ACP document annually or upon change in health status

## 2017-09-12 ENCOUNTER — OFFICE VISIT (OUTPATIENT)
Dept: INTERNAL MEDICINE CLINIC | Age: 82
End: 2017-09-12

## 2017-09-12 ENCOUNTER — TELEPHONE (OUTPATIENT)
Dept: INTERNAL MEDICINE CLINIC | Age: 82
End: 2017-09-12

## 2017-09-12 VITALS
TEMPERATURE: 97.9 F | WEIGHT: 173.2 LBS | BODY MASS INDEX: 24.25 KG/M2 | OXYGEN SATURATION: 98 % | SYSTOLIC BLOOD PRESSURE: 112 MMHG | HEIGHT: 71 IN | RESPIRATION RATE: 14 BRPM | DIASTOLIC BLOOD PRESSURE: 72 MMHG | HEART RATE: 60 BPM

## 2017-09-12 DIAGNOSIS — N20.0 NEPHROLITHIASIS: ICD-10-CM

## 2017-09-12 DIAGNOSIS — K40.91 UNILATERAL RECURRENT INGUINAL HERNIA WITHOUT OBSTRUCTION OR GANGRENE: ICD-10-CM

## 2017-09-12 DIAGNOSIS — K63.5 HYPERPLASTIC COLONIC POLYP, UNSPECIFIED PART OF COLON: ICD-10-CM

## 2017-09-12 DIAGNOSIS — M81.0 AGE-RELATED OSTEOPOROSIS WITHOUT CURRENT PATHOLOGICAL FRACTURE: ICD-10-CM

## 2017-09-12 DIAGNOSIS — Z71.89 ADVANCED DIRECTIVES, COUNSELING/DISCUSSION: ICD-10-CM

## 2017-09-12 DIAGNOSIS — Z00.00 MEDICARE ANNUAL WELLNESS VISIT, SUBSEQUENT: Primary | ICD-10-CM

## 2017-09-12 DIAGNOSIS — M79.671 RIGHT FOOT PAIN: ICD-10-CM

## 2017-09-12 DIAGNOSIS — E53.8 B12 DEFICIENCY: ICD-10-CM

## 2017-09-12 DIAGNOSIS — Z13.39 SCREENING FOR ALCOHOLISM: ICD-10-CM

## 2017-09-12 DIAGNOSIS — Z13.31 SCREENING FOR DEPRESSION: ICD-10-CM

## 2017-09-12 DIAGNOSIS — Z13.6 SCREENING FOR ISCHEMIC HEART DISEASE: ICD-10-CM

## 2017-09-12 DIAGNOSIS — Z12.11 SCREEN FOR COLON CANCER: ICD-10-CM

## 2017-09-12 DIAGNOSIS — Z13.1 SCREENING FOR DIABETES MELLITUS: ICD-10-CM

## 2017-09-12 DIAGNOSIS — Z71.89 ADVANCE DIRECTIVE DISCUSSED WITH PATIENT: ICD-10-CM

## 2017-09-12 NOTE — PROGRESS NOTES
1. Have you been to the ER, urgent care clinic or hospitalized since your last visit? NO.     2. Have you seen or consulted any other health care providers outside of the [address] [place] [address] since your last visit (Include any pap smears or colon screening)? YES  Dr. Felicia Garcia in [place]    Do you have an Advanced Directive? YES    Patient stated he will bring in a copy of advance directive.

## 2017-09-12 NOTE — ACP (ADVANCE CARE PLANNING)
Would you like information on Advanced Directives? YES      Patient stated he will bring in a copy of advance directive.

## 2017-09-12 NOTE — MR AVS SNAPSHOT
Visit Information Date & Time Provider Department Dept. Phone Encounter #  
 9/12/2017  9:00 AM Roxanne Lane MD Internist of [address] [place] [address] [phone] Your Appointments 9/29/2017 10:00 AM  
Follow Up with Breanna Mcmillan MD  
[address] [place] [facility] Appt Note: 6 month fu hernia [address] [facility] [address]  
[phone]  
  
   
 [address] [facility] [address] 9/11/2018  7:45 AM  
LAB with [facility] NURSE VISIT Internist of [facility] ([facility]) Appt Note: lab  
 [address] [address] [address] [place]  
  
   
 [address], [address] 9/18/2018  1:00 PM  
PHYSICAL with Roxanne Lane MD  
Internist of [facility]) Appt Note: [address]  
 [address] [facility] [address] [address] [place]  
  
   
 [address], [address] Upcoming Health Maintenance Date Due ZOSTER VACCINE AGE 60> 11/30/1989 GLAUCOMA SCREENING Q2Y 1/31/1995 DTaP/Tdap/Td series (1 - Tdap) 1/2/2005 INFLUENZA AGE 9 TO ADULT 8/1/2017 MEDICARE YEARLY EXAM 9/13/2018 Allergies as of 9/12/2017  Review Complete On: 9/12/2017 By: Omi Bolanos Severity Noted Reaction Type Reactions Sulfa (Sulfonamide Antibiotics)   Side Effect Nausea Only Current Immunizations  Reviewed on 9/8/2015 Name Date Influenza High Dose Vaccine PF 10/26/2015  2:00 PM, 9/30/2014 Influenza Vaccine PF 10/22/2013 Influenza Vaccine Split 10/23/2012 Influenza Vaccine Whole 11/12/2010 Pneumococcal Conjugate (PCV-13) 9/8/2015  8:15 AM  
 Pneumococcal Vaccine (Unspecified Type) 1/1/2006 Td 1/1/2005 Not reviewed this visit You Were Diagnosed With   
  
 Codes Comments Medicare annual wellness visit, subsequent    -  Primary ICD-10-CM: Z00.00 ICD-9-CM: V70.0 Advanced directives, counseling/discussion     ICD-10-CM: Z71.89 ICD-9-CM: V65.49 Screening for alcoholism     ICD-10-CM: Z13.89 ICD-9-CM: V79.1 Screening for depression     ICD-10-CM: Z13.89 ICD-9-CM: V79.0 Screen for colon cancer     ICD-10-CM: Z12.11 ICD-9-CM: V76.51 Screening for diabetes mellitus     ICD-10-CM: Z13.1 ICD-9-CM: V77.1 Screening for ischemic heart disease     ICD-10-CM: Z13.6 ICD-9-CM: V81.0 Vitals BP Pulse Temp Resp Height(growth percentile) Weight(growth percentile) 112/72 (BP 1 Location: Left arm, BP Patient Position: Sitting) 60 97.9 °F (36.6 °C) (Oral) 14 5' 11\" (1.803 m) 173 lb 3.2 oz (78.6 kg) SpO2 BMI Smoking Status 98% 24.16 kg/m2 Never Smoker Vitals History BMI and BSA Data Body Mass Index Body Surface Area  
 24.16 kg/m 2 1.98 m 2 Preferred Pharmacy Pharmacy Name Phone [address] [facility] [phone] Your Updated Medication List  
  
   
This list is accurate as of: 9/12/17  9:52 AM.  Always use your most recent med list. ADVIL 200 mg tablet Generic drug:  ibuprofen Take 400 mg by mouth every eight (8) hours as needed for Pain. FISH OIL 1,000 mg Cap Generic drug:  omega-3 fatty acids-vitamin e Take 1 Cap by mouth.  
  
 gabapentin 300 mg capsule Commonly known as:  NEURONTIN  
TAKE 1 CAPSULE TWICE A DAY  
  
 GLUCOSAMINE CHONDROITIN PLUS PO Take  by mouth.  
  
 meclizine 25 mg tablet Commonly known as:  ANTIVERT Take 1 Tab by mouth three (3) times daily as needed. VITAMIN B-12 1,000 mcg tablet Generic drug:  cyanocobalamin Take 1,000 mcg by mouth daily. VITAMIN D3 1,000 unit Cap Generic drug:  cholecalciferol Take 2,000 Units by mouth. We Performed the Following ADVANCE CARE PLANNING FIRST 30 MINS [28610 CPT(R)] Neymar 68 [[id number] [facility]] [place] ANNUAL ALCOHOL SCREEN 15 MIN [id number] [facility]] Patient Instructions Medicare Wellness Visit, Male The best way to live healthy is to have a healthy lifestyle by eating a well-balanced diet, exercising regularly, limiting alcohol and stopping smoking. Regular physical exams and screening tests are another way to keep healthy. Preventive exams provided by your health care provider can find health problems before they become diseases or illnesses. Preventive services including immunizations, screening tests, monitoring and exams can help you take care of your own health. All people over age 72 should have a pneumovax  and and a prevnar shot to prevent pneumonia. These are once in a lifetime unless you and your provider decide differently. All people over 65 should have a yearly flu shot and a tetanus vaccine every 10 years. Screening for diabetes mellitus with a blood sugar test should be done every year. Glaucoma is a disease of the eye due to increased ocular pressure that can lead to blindness and it should be done every year by an eye professional. 
 
Cardiovascular screening tests that check for elevated lipids (fatty part of blood) which can lead to heart disease and strokes should be done every 5 years. Colorectal screening that evaluates for blood or polyps in your colon should be done yearly as a stool test or every five years as a flexible sigmoidoscope or every 10 years as a colonoscopy up to age 76. Men up to age 76 may need a screening blood test for prostate cancer at certain intervals, depending on their personal and family history. This decision is between the patient and his provider. If you have been a smoker or had family history of abdominal aortic aneurysms, you and your provider may decide to schedule an ultrasound test of your aorta.  
 
Hepatitis C screening is also recommended for anyone born between [birth year] through 1965. A shingles vaccine is also recommended once in a lifetime after age 61. Your Medicare Wellness Exam is recommended annually. Here is a list of your current Health Maintenance items with a due date: 
Health Maintenance Due Topic Date Due  Shingles Vaccine  11/30/1989  Glaucoma Screening   01/31/1995  
 DTaP/Tdap/Td  (1 - Tdap) 01/02/2005 [facility] Annual Well Visit  10/26/2016  Flu Vaccine  08/01/2017 Introducing [facility] & HEALTH SERVICES! Dear Debbie Walker: Thank you for requesting a hovelstay account. Our records indicate that you already have an active hovelstay account. You can access your account anytime at [email or web address] Did you know that you can access your hospital and ER discharge instructions at any time in hovelstay? You can also review all of your test results from your hospital stay or ER visit. Additional Information If you have questions, please visit the Frequently Asked Questions section of the hovelstay website at [email or web address] Remember, hovelstay is NOT to be used for urgent needs. For medical emergencies, dial 911. Now available from your iPhone and Android! Please provide this summary of care documentation to your next provider. Your primary care clinician is listed as Vitaly Murillo. If you have any questions after today's visit, please call [phone].

## 2017-09-13 PROBLEM — Z78.9 ADVANCE DIRECTIVE IN CHART: Status: ACTIVE | Noted: 2017-09-12

## 2018-01-09 RX ORDER — GABAPENTIN 300 MG/1
CAPSULE ORAL
Qty: 180 CAP | Refills: 1 | Status: SHIPPED | OUTPATIENT
Start: 2018-01-09 | End: 2018-07-20 | Stop reason: SDUPTHER

## 2018-07-20 RX ORDER — GABAPENTIN 300 MG/1
CAPSULE ORAL
Qty: 180 CAP | Refills: 1 | Status: SHIPPED | OUTPATIENT
Start: 2018-07-20 | End: 2019-01-25 | Stop reason: SDUPTHER

## 2018-08-30 ENCOUNTER — OFFICE VISIT (OUTPATIENT)
Dept: INTERNAL MEDICINE CLINIC | Age: 83
End: 2018-08-30

## 2018-08-30 VITALS
BODY MASS INDEX: 24.22 KG/M2 | RESPIRATION RATE: 14 BRPM | WEIGHT: 173 LBS | HEART RATE: 56 BPM | DIASTOLIC BLOOD PRESSURE: 76 MMHG | TEMPERATURE: 98.1 F | SYSTOLIC BLOOD PRESSURE: 118 MMHG | HEIGHT: 71 IN | OXYGEN SATURATION: 96 %

## 2018-08-30 DIAGNOSIS — R31.29 MICROHEMATURIA: ICD-10-CM

## 2018-08-30 DIAGNOSIS — R53.82 CHRONIC FATIGUE: ICD-10-CM

## 2018-08-30 DIAGNOSIS — K57.90 DIVERTICULOSIS OF INTESTINE WITHOUT BLEEDING, UNSPECIFIED INTESTINAL TRACT LOCATION: ICD-10-CM

## 2018-08-30 DIAGNOSIS — E55.9 VITAMIN D DEFICIENCY: ICD-10-CM

## 2018-08-30 DIAGNOSIS — K63.5 HYPERPLASTIC COLONIC POLYP, UNSPECIFIED PART OF COLON: Primary | ICD-10-CM

## 2018-08-30 DIAGNOSIS — E53.8 B12 DEFICIENCY: ICD-10-CM

## 2018-08-30 NOTE — MR AVS SNAPSHOT
[address] [facility] [place] 
 
 
 [address], [address] [place] [address] [facility] 
[phone] Patient: Rasheed Giron MRN: [id number] PVP:[id number] Visit Information Date & Time Provider Department Dept. Phone Encounter #  
 8/30/2018 11:20 AM Bree Espinal MD [facility] 21  Your Appointments 9/11/2018  7:45 AM  
LAB with [facility] NURSE VISIT [facility]ists of [facility] ([facility]) Appt Note: lab  
 [address], [address] [address] [address] [place]  
  
   
 [address], [address] 9/18/2018  1:00 PM  
PHYSICAL with Bree Espinal MD  
[facility] Appt Note: [address]  
 [address] [facility] [address] [address] [address] [place]  
  
   
 [address], [address] Upcoming Health Maintenance Date Due ZOSTER VACCINE AGE 60> 11/30/1989 GLAUCOMA SCREENING Q2Y 1/31/1995 DTaP/Tdap/Td series (1 - Tdap) 1/2/2005 Influenza Age 5 to Adult 8/1/2018 MEDICARE YEARLY EXAM 9/13/2018 Allergies as of 8/30/2018  Review Complete On: 8/30/2018 By: Acacia Cohen Severity Noted Reaction Type Reactions Sulfa (Sulfonamide Antibiotics)   Side Effect Nausea Only Current Immunizations  Reviewed on 9/8/2015 Name Date Influenza High Dose Vaccine PF 10/26/2015  2:00 PM, 9/30/2014 Influenza Vaccine PF 10/22/2013 Influenza Vaccine Split 10/23/2012 Influenza Vaccine Whole 11/12/2010 Pneumococcal Conjugate (PCV-13) 9/8/2015  8:15 AM  
 Pneumococcal Vaccine (Unspecified Type) 1/1/2006 Td 1/1/2005 Not reviewed this visit Vitals BP Pulse Temp Resp Height(growth percentile) Weight(growth percentile) 118/76 (BP 1 Location: Right arm, BP Patient Position: Sitting) (!) 56 98.1 °F (36.7 °C) (Oral) 14 5' 11\" (1.803 m) 173 lb (78.5 kg) SpO2 BMI Smoking Status 96% 24.13 kg/m2 Never Smoker Vitals History BMI and BSA Data Body Mass Index Body Surface Area  
 24.13 kg/m 2 1.98 m 2 Preferred Pharmacy Pharmacy Name Phone Tomi George, [facility] [phone] Your Updated Medication List  
  
   
This list is accurate as of 8/30/18 12:22 PM.  Always use your most recent med list. ADVIL 200 mg tablet Generic drug:  ibuprofen Take 400 mg by mouth every eight (8) hours as needed for Pain. FISH OIL 1,000 mg Cap Generic drug:  omega-3 fatty acids-vitamin e Take 1 Cap by mouth.  
  
 gabapentin 300 mg capsule Commonly known as:  NEURONTIN  
TAKE 1 CAPSULE TWICE A DAY  
  
 GLUCOSAMINE CHONDROITIN PLUS PO Take  by mouth.  
  
 meclizine 25 mg tablet Commonly known as:  ANTIVERT Take 1 Tab by mouth three (3) times daily as needed. VITAMIN B-12 1,000 mcg tablet Generic drug:  cyanocobalamin Take 1,000 mcg by mouth daily. VITAMIN D3 1,000 unit Cap Generic drug:  cholecalciferol Take 2,000 Units by mouth. Introducing [facility] & HEALTH SERVICES! Dear Alma Rosa Hampton: Thank you for requesting a [employer] account. Our records indicate that you already have an active [employer] account. You can access your account anytime at [email or web address] Did you know that you can access your hospital and ER discharge instructions at any time in [employer]omi? You can also review all of your test results from your hospital stay or ER visit. Additional Information If you have questions, please visit the Frequently Asked Questions section of the [employer] website at [email or web address] Remember, Awesomi is NOT to be used for urgent needs. For medical emergencies, dial 911. Now available from your iPhone and Android! Please provide this summary of care documentation to your next provider. Your primary care clinician is listed as Drinda Epley. If you have any questions after today's visit, please call [phone].

## 2018-08-30 NOTE — PROGRESS NOTES
Chief Complaint Patient presents with  Leg Swelling Patient present for complaints of lower right leg swelling. Patient states that, 2 weeks ago, when he was cutting up fire wood a piece of wood hit him in the leg. Patient states he has used ice in the beging to get the swelling down. 1. Have you been to the ER, urgent care clinic or hospitalized since your last visit? NO.  
 
2. Have you seen or consulted any other health care providers outside of the [address] [place] [address] since your last visit (Include any pap smears or colon screening)?  NO

## 2018-08-30 NOTE — PROGRESS NOTES
80 y.o. WHITE OR  male who presents for evaluation. He is here to have his leg looked at. Remains active and he was cutting some wood 2 weeks ago when apparently, a small piece fell to the floor and hit him in the right anterior mid shin. He then developed what sounds like a hematoma and bruising which eventually went down and encompassed the left foot. In the interim, the brusing and swelling have gone down although still w residual knot in the right anterior shin where it all started. He iced it down day 1 only. No pain currently Also, he wants to be evaluated for tiredness as his wife keeps harping on about how tired he looks apparently. He appears to have interrupted sleep and has been known to snore although no confirmed apnea spells. He has RPE shceduled in <2 weeks and will have labs drawn beforehand Past Medical History:  
Diagnosis Date  B12 deficiency  Closed compression fracture of L4 lumbar vertebra (HCC)  Colon polyps Dr Fabby Valdes  Degenerative joint disease (DJD) of lumbar spine MRI 11/14 w multilevel disease; chronic lbp w sciatica saw Dr Mitzy More  Distal radial fracture, right wrist 02/2015 Daya Ortiz on ice. s/p ORIF with right dorsal endoplate nail, two proximal screws, and four distal pegs. Dr. Myron Alejandre.  Diverticulosis   
 on CT 4/16  Erectile dysfunction  Nephrolithiasis 04/2016  
 right tiny stone on CT; microhematuria  Osteoporosis   
 ibandronate 2009-5/13 stopped due to tooth issues per his endodontist;  DEXA t score -2.4 spibe, -2.1 hip (8/11); -2.3 spine, -1.9 hip (8/13); -2.4 spine, -2.4 hip (9/15)  Proctalgia fugax  RBBB (right bundle branch block)  Right inguinal hernia Dr Ravin Collins Current Outpatient Prescriptions Medication Sig  
 gabapentin (NEURONTIN) 300 mg capsule TAKE 1 CAPSULE TWICE A DAY  ibuprofen (ADVIL) 200 mg tablet Take 400 mg by mouth every eight (8) hours as needed for Pain.  GLUC/CHND/OM3/DHA/EPA/FISH/STR (GLUCOSAMINE CHONDROITIN PLUS PO) Take  by mouth.  cyanocobalamin (VITAMIN B-12) 1,000 mcg tablet Take 1,000 mcg by mouth daily.  omega-3 fatty acids-vitamin e (FISH OIL) 1,000 mg Cap Take 1 Cap by mouth.  Cholecalciferol, Vitamin D3, (VITAMIN D) 1,000 unit Cap Take 2,000 Units by mouth.  meclizine (ANTIVERT) 25 mg tablet Take 1 Tab by mouth three (3) times daily as needed. No current facility-administered medications for this visit. Allergies Allergen Reactions  Sulfa (Sulfonamide Antibiotics) Nausea Only Visit Vitals  /76 (BP 1 Location: Right arm, BP Patient Position: Sitting)  Pulse (!) 56  Temp 98.1 °F (36.7 °C) (Oral)  Resp 14  
 Ht 5' 11\" (1.803 m)  Wt 173 lb (78.5 kg)  SpO2 96%  BMI 24.13 kg/m2 there is a resolving hematoma in the right lower/mid shin area with minimal redness and tenderness, about 4.5 in greater diameter. Minimal tenderness, no fluctuance or distal bruising, pulses 2+ DP/PT Assessment and plan: 1. Resolving hematoma. Observation 2. Tiredness. Check labs and go from there, f/u 2 weeks as scheduled Above conditions discussed at length and patient vocalized understanding. All questions answered to patient satisfaction

## 2018-09-11 ENCOUNTER — HOSPITAL ENCOUNTER (OUTPATIENT)
Dept: LAB | Age: 83
Discharge: HOME | End: 2018-09-11
Payer: MEDICARE

## 2018-09-11 ENCOUNTER — APPOINTMENT (OUTPATIENT)
Dept: INTERNAL MEDICINE CLINIC | Age: 83
End: 2018-09-11

## 2018-09-11 DIAGNOSIS — R31.29 MICROHEMATURIA: ICD-10-CM

## 2018-09-11 DIAGNOSIS — R53.82 CHRONIC FATIGUE: ICD-10-CM

## 2018-09-11 DIAGNOSIS — K63.5 HYPERPLASTIC COLONIC POLYP, UNSPECIFIED PART OF COLON: ICD-10-CM

## 2018-09-11 DIAGNOSIS — E55.9 VITAMIN D DEFICIENCY: ICD-10-CM

## 2018-09-11 DIAGNOSIS — K57.90 DIVERTICULOSIS OF INTESTINE WITHOUT BLEEDING, UNSPECIFIED INTESTINAL TRACT LOCATION: ICD-10-CM

## 2018-09-11 LAB
ERYTHROCYTE [DISTWIDTH] IN BLOOD BY AUTOMATED COUNT: 14 % (ref 11.6–14.5)
FOLATE SERPL-MCNC: >20 NG/ML (ref 3.1–17.5)
HCT VFR BLD AUTO: 39.9 % (ref 36–48)
HGB BLD-MCNC: 13.2 G/DL (ref 13–16)
MCH RBC QN AUTO: 31 PG (ref 24–34)
MCHC RBC AUTO-ENTMCNC: 33.1 G/DL (ref 31–37)
MCV RBC AUTO: 93.7 FL (ref 74–97)
PLATELET # BLD AUTO: 192 K/UL (ref 135–420)
PMV BLD AUTO: 10.3 FL (ref 9.2–11.8)
RBC # BLD AUTO: 4.26 M/UL (ref 4.7–5.5)
T4 FREE SERPL-MCNC: 0.9 NG/DL (ref 0.7–1.5)
TSH SERPL DL<=0.05 MIU/L-ACNC: 1.4 UIU/ML (ref 0.36–3.74)
VIT B12 SERPL-MCNC: 537 PG/ML (ref 211–911)
WBC # BLD AUTO: 5.1 K/UL (ref 4.6–13.2)

## 2018-09-11 PROCEDURE — 85027 COMPLETE CBC AUTOMATED: CPT | Performed by: INTERNAL MEDICINE

## 2018-09-11 PROCEDURE — 82607 VITAMIN B-12: CPT | Performed by: INTERNAL MEDICINE

## 2018-09-11 PROCEDURE — 36415 COLL VENOUS BLD VENIPUNCTURE: CPT | Performed by: INTERNAL MEDICINE

## 2018-09-11 PROCEDURE — 84439 ASSAY OF FREE THYROXINE: CPT | Performed by: INTERNAL MEDICINE

## 2018-09-11 PROCEDURE — 84443 ASSAY THYROID STIM HORMONE: CPT | Performed by: INTERNAL MEDICINE

## 2018-09-11 PROCEDURE — 82306 VITAMIN D 25 HYDROXY: CPT | Performed by: INTERNAL MEDICINE

## 2018-09-11 PROCEDURE — 80053 COMPREHEN METABOLIC PANEL: CPT | Performed by: INTERNAL MEDICINE

## 2018-09-12 ENCOUNTER — TELEPHONE (OUTPATIENT)
Dept: INTERNAL MEDICINE CLINIC | Age: 83
End: 2018-09-12

## 2018-09-12 LAB
25(OH)D3 SERPL-MCNC: 40.7 NG/ML (ref 30–100)
ALBUMIN SERPL-MCNC: 3.5 G/DL (ref 3.4–5)
ALBUMIN/GLOB SERPL: 1.3 {RATIO} (ref 0.8–1.7)
ALP SERPL-CCNC: 68 U/L (ref 45–117)
ALT SERPL-CCNC: 20 U/L (ref 16–61)
ANION GAP SERPL CALC-SCNC: 8 MMOL/L (ref 3–18)
AST SERPL-CCNC: 16 U/L (ref 15–37)
BILIRUB SERPL-MCNC: 0.4 MG/DL (ref 0.2–1)
BUN SERPL-MCNC: 22 MG/DL (ref 7–18)
BUN/CREAT SERPL: 25 (ref 12–20)
CALCIUM SERPL-MCNC: 8.8 MG/DL (ref 8.5–10.1)
CHLORIDE SERPL-SCNC: 110 MMOL/L (ref 100–108)
CO2 SERPL-SCNC: 26 MMOL/L (ref 21–32)
CREAT SERPL-MCNC: 0.87 MG/DL (ref 0.6–1.3)
GLOBULIN SER CALC-MCNC: 2.8 G/DL (ref 2–4)
GLUCOSE SERPL-MCNC: 83 MG/DL (ref 74–99)
POTASSIUM SERPL-SCNC: 4.3 MMOL/L (ref 3.5–5.5)
PROT SERPL-MCNC: 6.3 G/DL (ref 6.4–8.2)
SODIUM SERPL-SCNC: 144 MMOL/L (ref 136–145)

## 2018-09-12 NOTE — TELEPHONE ENCOUNTER
[id number]    They need to see if another diagnosis code for the patient's B12 to be filed. The one that was sent to their lab is not working.     Please advise the lab

## 2018-09-13 NOTE — PROGRESS NOTES
80 y.o. WHITE [place]  male who presents for evaluation. Wife is present for the interview No cardiovascular complaints. Remains active by doing yardwork, chores, not much walking for exercise anymore No gi or gu issues His wife was concerned about him seeming more tired than usual.  He thinks it's related to him waking up to urinate not infrequently which limits him to 5 hrs of sleep. No witnessed snoring or apnea, denies any headaches, uncontrolled bp, daytime somnolence, depression sx He saw Dr Chinmay Carver for a foot problem. The hematoma we saw him for a couple weeks back resolved LAST MEDICARE WELLNESS EXAM: 10/26/15, 9/12/17, 9/18/18 ACP 9/12/17, 9/18/18 Past Medical History:  
Diagnosis Date  B12 deficiency  Closed compression fracture of L4 lumbar vertebra (HCC)  Colon polyps Dr Joslyn Ormond  Degenerative joint disease (DJD) of lumbar spine MRI 11/14 w multilevel disease; chronic lbp w sciatica saw Dr Vel Ha  Distal radial fracture, right wrist 02/2015 Emile Born on ice. s/p ORIF with right dorsal endoplate nail, two proximal screws, and four distal pegs. Dr. Ayala Alt.  Diverticulosis   
 on CT 4/16  Erectile dysfunction  Nephrolithiasis 04/2016  
 right tiny stone on CT; microhematuria  Osteoporosis   
 ibandronate 2009-5/13 stopped due to tooth issues per his endodontist;  DEXA t score -2.4 spibe, -2.1 hip (8/11); -2.3 spine, -1.9 hip (8/13); -2.4 spine, -2.4 hip (9/15)  Proctalgia fugax  RBBB (right bundle branch block)  Right inguinal hernia Dr Eva Ramirez 80 Past Surgical History:  
Procedure Laterality Date  HX CATARACT REMOVAL    
 HX COLONOSCOPY Dr Joslyn Ormond 2012 negative  HX HERNIA REPAIR  2002 [facility] Social History Social History  Marital status:  Spouse name: N/A  
 Number of children: N/A  
 Years of education: N/A Occupational History  Not on file. Social History Main Topics  Smoking status: Never Smoker  Smokeless tobacco: Never Used  Alcohol use No  
 Drug use: No  
 Sexual activity: Not Currently Other Topics Concern  Not on file Social History Narrative History reviewed. No pertinent family history. Current Outpatient Prescriptions Medication Sig  
 gabapentin (NEURONTIN) 300 mg capsule TAKE 1 CAPSULE TWICE A DAY  ibuprofen (ADVIL) 200 mg tablet Take 400 mg by mouth every eight (8) hours as needed for Pain.  GLUC/CHND/OM3/DHA/EPA/FISH/STR (GLUCOSAMINE CHONDROITIN PLUS PO) Take  by mouth.  cyanocobalamin (VITAMIN B-12) 1,000 mcg tablet Take 1,000 mcg by mouth daily.  omega-3 fatty acids-vitamin e (FISH OIL) 1,000 mg Cap Take 1 Cap by mouth.  Cholecalciferol, Vitamin D3, (VITAMIN D) 1,000 unit Cap Take 2,000 Units by mouth.  meclizine (ANTIVERT) 25 mg tablet Take 1 Tab by mouth three (3) times daily as needed. (Patient taking differently: Take 25 mg by mouth three (3) times daily as needed. Indications: not taking) No current facility-administered medications for this visit. Allergies Allergen Reactions  Sulfa (Sulfonamide Antibiotics) Nausea Only REVIEW OF SYSTEMS: colo Dr Joslyn Ormond 2012 Ophtho  no vision change or eye pain Oral  no mouth pain, tongue or tooth problems Ears  no hearing loss, ear pain, fullness, no swallowing problems Cardiac  no CP, PND, orthopnea, edema, palpitations or syncope Chest  no breast masses Resp  no wheezing, chronic coughing, dyspnea GI  no heartburn, nausea, vomiting, change in bowel habits, bleeding, hemorrhoids Urinary  no dysuria, hematuria, flank pain, urgency, frequency Genitals  no genital lesions, discharge, masses, ulceration, warts Ortho  no swelling, dec ROM, myalgias Derm  no nail abnormalities, rashes, lesions of note, hair loss Psych  denies any anxiety or depression symptoms, no hallucinations or violent ideation Constitutional  no wt loss, night sweats, unexplained fevers Neuro  no focal weakness, numbness, paresthesias, incoordination, ataxia, involuntary movements Endo - no polyuria, polydipsia, nocturia, hot flashes Visit Vitals  /72  Pulse 69  Temp 98.2 °F (36.8 °C) (Oral)  Ht 5' 11\" (1.803 m)  Wt 174 lb (78.9 kg)  SpO2 98%  BMI 24.27 kg/m2 Affect is appropriate. Mood stable No apparent distress HEENT --Anicteric sclerae, tympanic membranes normal,  ear canals normal. 
PERRL, EOMI, conjunctiva and lids normal. No thyromegaly, JVD, or bruits. Lungs --Clear to auscultation, normal percussion. Heart --Regular rate and rhythm, no murmurs, rubs, gallops, or clicks. Chest wall --Nontender to palpation. PMI normal. 
Abdomen -- Soft and nontender, no hepatosplenomegaly or masses. Prostate  -- no asymmetry, nodularity, tenderness or enlargement Rectal  -- normal tone, guiaiac negative brown stool Extremities -- Without cyanosis, clubbing, edema. 2+ pulses equally and bilaterally. LABS From 9/14 showed gluc 90,   cr 1.43, gfr 47,  alt<5,  chol 149, tg 56, hdl 59, ldl-c 79, wbc 5.7, hb 13.2, plt 170, ua tr bl, vit d 46.4 From 2/15 showed gluc 114, cr 1.06, gfr>60, alt 17,        wbc 6.9, hb 12.9, plt 187 From 9/15 showed gluc 89,   cr 0.93, gfr 75,  alt 13, chol 165, tg 92, hdl 66, ldl-c 81, wbc 5.8, hb 14.0, plt 198, ua tr bl, vit d 47.3, b12 486 From 8/16 showed gluc 148, cr 1.16, gfr 60,  alt 20,                           ck/trop neg From 8/16 showed gluc 80,   cr 0.97, gfr>60, alt 28, chol 118, tg 71, hdl 46, ldl-c 58, wbc 5.7, hb 12.5, plt 341, ua tr bl, vit d 40.9, tsh 1.04 From 9/17 showed gluc 89,   cr 1.01, gfr>60, alt 20, chol 152, tg 76, hdl 74, ldl-c 63, wbc 5.5, hb 13.7, plt 195, ua neg, vit d 52.0, tsh 1.32 Results for orders placed or performed during the hospital encounter of 09/11/18 CBC W/O DIFF Result Value Ref Range WBC 5.1 4.6 - 13.2 K/uL RBC 4.26 (L) 4.70 - 5.50 M/uL  
 HGB 13.2 13.0 - 16.0 g/dL HCT 39.9 36.0 - 48.0 % MCV 93.7 74.0 - 97.0 FL  
 MCH 31.0 24.0 - 34.0 PG  
 MCHC 33.1 31.0 - 37.0 g/dL  
 RDW 14.0 11.6 - 14.5 % PLATELET 600 205 - 042 K/uL MPV 10.3 9.2 - 02.4 FL  
METABOLIC PANEL, COMPREHENSIVE Result Value Ref Range Sodium 144 136 - 145 mmol/L Potassium 4.3 3.5 - 5.5 mmol/L Chloride 110 (H) 100 - 108 mmol/L  
 CO2 26 21 - 32 mmol/L Anion gap 8 3.0 - 18 mmol/L Glucose 83 74 - 99 mg/dL BUN 22 (H) 7.0 - 18 MG/DL Creatinine 0.87 0.6 - 1.3 MG/DL  
 BUN/Creatinine ratio 25 (H) 12 - 20 GFR est AA >60 >60 ml/min/1.73m2 GFR est non-AA >60 >60 ml/min/1.73m2 Calcium 8.8 8.5 - 10.1 MG/DL Bilirubin, total 0.4 0.2 - 1.0 MG/DL  
 ALT (SGPT) 20 16 - 61 U/L  
 AST (SGOT) 16 15 - 37 U/L Alk. phosphatase 68 45 - 117 U/L Protein, total 6.3 (L) 6.4 - 8.2 g/dL Albumin 3.5 3.4 - 5.0 g/dL Globulin 2.8 2.0 - 4.0 g/dL A-G Ratio 1.3 0.8 - 1.7 TSH 3RD GENERATION Result Value Ref Range TSH 1.40 0.36 - 3.74 uIU/mL T4, FREE Result Value Ref Range T4, Free 0.9 0.7 - 1.5 NG/DL  
VITAMIN B12 & FOLATE Result Value Ref Range Vitamin B12 537 211 - 911 pg/mL Folate >20.0 (H) 3.10 - 17.50 ng/mL VITAMIN D, 25 HYDROXY Result Value Ref Range Vitamin D 25-Hydroxy 40.7 30 - 100 ng/mL Patient Active Problem List  
Diagnosis Code  Impotence of organic origin N52.9  Right bundle branch block I45.10  Colon polyp K63.5  Osteoporosis s/p compression fx L4, fx wrist  M81.0  Nephrolithiasis N20.0  Inguinal hernia, right Dr Katerina Redd K40.90  Microhematuria R31.29  
 Low back pain with left-sided sciatica M54.42  
 Advance directive in chart Z78.9 Assessment and plan: 1. Colon polyp. Fiber 2. Osteoporosis. Ca/d, weight bearing exercise. Declined f/u DEXA as not interested in any specific treatment 3. Nephrolithiasis. Hydration 4. RIH. Observation, saw Dr Karina Galicia previously 5. Tiredness. Discussed further eval with possible neuro consult r/o narcolepsy or even eugenio; he wants to watch for now and will call if he changes his mind RTC 9/19 Above conditions discussed at length and patient vocalized understanding. All questions answered to patient satisfaction

## 2018-09-13 NOTE — PROGRESS NOTES
This is a Subsequent Medicare Annual Wellness Exam  
 
I have reviewed the patient's medical history in detail and updated the computerized patient record. History Past Medical History:  
Diagnosis Date  B12 deficiency  Closed compression fracture of L4 lumbar vertebra (HCC)  Colon polyps Dr Benjy Gill  Degenerative joint disease (DJD) of lumbar spine MRI 11/14 w multilevel disease; chronic lbp w sciatica saw Dr Penelope Siegel  Distal radial fracture, right wrist 02/2015 Shira Kofi on ice. s/p ORIF with right dorsal endoplate nail, two proximal screws, and four distal pegs. Dr. Daria Stevens.  Diverticulosis   
 on CT 4/16  Erectile dysfunction  Nephrolithiasis 04/2016  
 right tiny stone on CT; microhematuria  Osteoporosis   
 ibandronate 2009-5/13 stopped due to tooth issues per his endodontist;  DEXA t score -2.4 spibe, -2.1 hip (8/11); -2.3 spine, -1.9 hip (8/13); -2.4 spine, -2.4 hip (9/15)  Proctalgia fugax  RBBB (right bundle branch block)  Right inguinal hernia Dr Mamie Lyons Past Surgical History:  
Procedure Laterality Date  HX CATARACT REMOVAL    
 HX COLONOSCOPY Dr Benjy Gill 2012 negative  HX HERNIA REPAIR  2002 [facility] repair Current Outpatient Prescriptions Medication Sig Dispense Refill  gabapentin (NEURONTIN) 300 mg capsule TAKE 1 CAPSULE TWICE A  Cap 1  ibuprofen (ADVIL) 200 mg tablet Take 400 mg by mouth every eight (8) hours as needed for Pain.  GLUC/CHND/OM3/DHA/EPA/FISH/STR (GLUCOSAMINE CHONDROITIN PLUS PO) Take  by mouth.  cyanocobalamin (VITAMIN B-12) 1,000 mcg tablet Take 1,000 mcg by mouth daily.  omega-3 fatty acids-vitamin e (FISH OIL) 1,000 mg Cap Take 1 Cap by mouth.  Cholecalciferol, Vitamin D3, (VITAMIN D) 1,000 unit Cap Take 2,000 Units by mouth.     
 meclizine (ANTIVERT) 25 mg tablet Take 1 Tab by mouth three (3) times daily as needed. (Patient taking differently: Take 25 mg by mouth three (3) times daily as needed. Indications: not taking) 30 Tab 0 Allergies Allergen Reactions  Sulfa (Sulfonamide Antibiotics) Nausea Only History reviewed. No pertinent family history. Social History Substance Use Topics  Smoking status: Never Smoker  Smokeless tobacco: Never Used  Alcohol use No  
 
Depression Risk Factor Screening: PHQ over the last two weeks 9/18/2018 Little interest or pleasure in doing things Not at all Feeling down, depressed, irritable, or hopeless Not at all Total Score PHQ 2 0 SCREENINGS Colonoscopy last done 2012 Dr Leticia Chavez Prostate NINA done today Immunization History Administered Date(s) Administered  Influenza High Dose Vaccine PF 09/30/2014, 10/26/2015  Influenza Vaccine PF 10/22/2013  Influenza Vaccine Split 10/23/2012  Influenza Vaccine Whole 11/12/2010  Pneumococcal Conjugate (PCV-13) 09/08/2015  Pneumococcal Vaccine (Unspecified Type) 01/01/2006  Td 01/01/2005 Alcohol Risk Factor Screening: You do not drink alcohol or very rarely. Functional Ability and Level of Safety:  
Hearing Loss Hearing is good. Activities of Daily Living The home contains: no safety equipment Patient does total self care Fall Risk Fall Risk Assessment, last 12 mths 9/18/2018 Able to walk? Yes Fall in past 12 months? No  
Fall with injury? -  
Number of falls in past 12 months - Fall Risk Score -  
 
 
Abuse Screen Patient is not abused Cognitive Screening Evaluation of Cognitive Function: 
Has your family/caregiver stated any concerns about your memory: no 
Normal 
 
Patient Care Team  
Patient Care Team: 
Sydnee Blood MD as PCP - General (Internal Medicine) Gege Burton RN as Ambulatory Care Navigator (Internal Medicine) Bard Manav RN as Ambulatory Care Navigator (Internal Medicine) Assessment/Plan Education and counseling provided: 
Are appropriate based on today's review and evaluation End-of-Life planning (with patient's consent) Influenza Vaccine Cardiovascular screening blood test 
Diabetes screening test  
Suggested shingrix Colonoscopy beyond age Diagnoses and all orders for this visit: 
 
1. Medicare annual wellness visit, subsequent 2. Advanced directives, counseling/discussion -     ADVANCE CARE PLANNING FIRST 30 MINS 3. Screening for alcoholism -     Annual  Alcohol Screen 15 min () 4. Screening for depression -     Depression Screen Annual 
 
5. Screen for colon cancer 6. Screening for diabetes mellitus 7. Screening for ischemic heart disease 8. Special screening for malignant neoplasm of prostate -     Digital Rectal Exam () 9. Hyperplastic colonic polyp, unspecified part of colon 10. Age-related osteoporosis without current pathological fracture -     METABOLIC PANEL, COMPREHENSIVE; Future 11. Unilateral recurrent inguinal hernia without obstruction or gangrene 12. Right bundle branch block 
-     CBC W/O DIFF; Future 13. Impotence of organic origin Health Maintenance Due Topic Date Due  
 ZOSTER VACCINE AGE 60>  11/30/1989  GLAUCOMA SCREENING Q2Y  01/31/1995  
 DTaP/Tdap/Td series (1 - Tdap) 01/02/2005

## 2018-09-13 NOTE — PATIENT INSTRUCTIONS
Medicare Wellness Visit, Male The best way to live healthy is to have a lifestyle where you eat a well-balanced diet, exercise regularly, limit alcohol use, and quit all forms of tobacco/nicotine, if applicable. Regular preventive services are another way to keep healthy. Preventive services (vaccines, screening tests, monitoring & exams) can help personalize your care plan, which helps you manage your own care. Screening tests can find health problems at the earliest stages, when they are easiest to treat. [facility] follows the current, evidence-based guidelines published by the [facility] ([facility]) when recommending preventive services for our patients. Because we follow these guidelines, sometimes recommendations change over time as research supports it. (For example, a prostate screening blood test is no longer routinely recommended for men with no symptoms.) Of course, you and your doctor may decide to screen more often for some diseases, based on your risk and co-morbidities (chronic disease you are already diagnosed with). Preventive services for you include: - Medicare offers their members a free annual wellness visit, which is time for you and your primary care provider to discuss and plan for your preventive service needs. Take advantage of this benefit every year! 
-All adults over age 72 should receive the recommended pneumonia vaccines. Current USPSTF guidelines recommend a series of two vaccines for the best pneumonia protection.  
-All adults should have a flu vaccine yearly and an ECG.  All adults age 61 and older should receive a shingles vaccine once in their lifetime.   
-All adults age 38-68 who are overweight should have a diabetes screening test once every three years.  
-Other screening tests & preventive services for persons with diabetes include: an eye exam to screen for diabetic retinopathy, a kidney function test, a foot exam, and stricter control over your cholesterol.  
-Cardiovascular screening for adults with routine risk involves an electrocardiogram (ECG) at intervals determined by the provider.  
-Colorectal cancer screening should be done for adults age 54-65 with no increased risk factors for colorectal cancer. There are a number of acceptable methods of screening for this type of cancer. Each test has its own benefits and drawbacks. Discuss with your provider what is most appropriate for you during your annual wellness visit. The different tests include: colonoscopy (considered the best screening method), a fecal occult blood test, a fecal DNA test, and sigmoidoscopy. 
-All adults born between [facility] should be screened once for Hepatitis C. 
-An Abdominal Aortic Aneurysm (AAA) Screening is recommended for men age 73-68 who has ever smoked in their lifetime. Here is a list of your current Health Maintenance items (your personalized list of preventive services) with a due date: 
Health Maintenance Due Topic Date Due  Shingles Vaccine  11/30/1989  Glaucoma Screening   01/31/1995  
 DTaP/Tdap/Td  (1 - Tdap) 01/02/2005  Flu Vaccine  08/01/2018 [facility] Annual Well Visit  09/13/2018

## 2018-09-18 ENCOUNTER — OFFICE VISIT (OUTPATIENT)
Dept: INTERNAL MEDICINE CLINIC | Age: 83
End: 2018-09-18

## 2018-09-18 VITALS
HEIGHT: 71 IN | TEMPERATURE: 98.2 F | BODY MASS INDEX: 24.36 KG/M2 | HEART RATE: 69 BPM | OXYGEN SATURATION: 98 % | SYSTOLIC BLOOD PRESSURE: 116 MMHG | DIASTOLIC BLOOD PRESSURE: 72 MMHG | WEIGHT: 174 LBS

## 2018-09-18 DIAGNOSIS — N52.9 IMPOTENCE OF ORGANIC ORIGIN: ICD-10-CM

## 2018-09-18 DIAGNOSIS — K40.91 UNILATERAL RECURRENT INGUINAL HERNIA WITHOUT OBSTRUCTION OR GANGRENE: ICD-10-CM

## 2018-09-18 DIAGNOSIS — M81.0 AGE-RELATED OSTEOPOROSIS WITHOUT CURRENT PATHOLOGICAL FRACTURE: ICD-10-CM

## 2018-09-18 DIAGNOSIS — Z71.89 ADVANCED DIRECTIVES, COUNSELING/DISCUSSION: ICD-10-CM

## 2018-09-18 DIAGNOSIS — Z13.6 SCREENING FOR ISCHEMIC HEART DISEASE: ICD-10-CM

## 2018-09-18 DIAGNOSIS — K63.5 HYPERPLASTIC COLONIC POLYP, UNSPECIFIED PART OF COLON: ICD-10-CM

## 2018-09-18 DIAGNOSIS — Z00.00 MEDICARE ANNUAL WELLNESS VISIT, SUBSEQUENT: Primary | ICD-10-CM

## 2018-09-18 DIAGNOSIS — Z13.31 SCREENING FOR DEPRESSION: ICD-10-CM

## 2018-09-18 DIAGNOSIS — Z12.5 SPECIAL SCREENING FOR MALIGNANT NEOPLASM OF PROSTATE: ICD-10-CM

## 2018-09-18 DIAGNOSIS — I45.10 RIGHT BUNDLE BRANCH BLOCK: ICD-10-CM

## 2018-09-18 DIAGNOSIS — Z12.11 SCREEN FOR COLON CANCER: ICD-10-CM

## 2018-09-18 DIAGNOSIS — Z13.1 SCREENING FOR DIABETES MELLITUS: ICD-10-CM

## 2018-09-18 DIAGNOSIS — Z13.39 SCREENING FOR ALCOHOLISM: ICD-10-CM

## 2018-09-18 NOTE — MR AVS SNAPSHOT
[address] [facility] 
 
 
 [address], [address] [facility] 
[phone] Patient: Beverly Tuttle MRN: [id number] JER:[id number] Visit Information Date & Time Provider Department Dept. Phone Encounter #  
 9/18/2018  1:00 PM Jose Ramon Orozco MD [facility] [address] [place] [address] [phone] Your Appointments 9/19/2019  7:45 AM  
LAB with [facility] NURSE VISIT [facility] [address] [place] [address] ([facility] Appt Note: lab  
 [address], [address] [place] [address] [address]  
  
   
 [address], [address]  
  
    
 9/26/2019  1:00 PM  
PHYSICAL with Jose Ramon Orozco MD  
[facility] [address] [place] [address] [facility] Appt Note:  1 year [address], [address] [place] [address]  
  
   
 [address], [address] Upcoming Health Maintenance Date Due ZOSTER VACCINE AGE 60> 11/30/1989 GLAUCOMA SCREENING Q2Y 1/31/1995 DTaP/Tdap/Td series (1 - Tdap) 1/2/2005 Influenza Age 5 to Adult 8/1/2018 MEDICARE YEARLY EXAM 9/19/2019 Allergies as of 9/18/2018  Review Complete On: 9/18/2018 By: Emmanuel Reyes LPN Severity Noted Reaction Type Reactions Sulfa (Sulfonamide Antibiotics)   Side Effect Nausea Only Current Immunizations  Reviewed on 9/13/2018 Name Date Influenza High Dose Vaccine PF 10/26/2015  2:00 PM, 9/30/2014 Influenza Vaccine PF 10/22/2013 Influenza Vaccine Split 10/23/2012 Influenza Vaccine Whole 11/12/2010 Pneumococcal Conjugate (PCV-13) 9/8/2015  8:15 AM  
 Pneumococcal Vaccine (Unspecified Type) 1/1/2006 Td 1/1/2005 Reviewed by Jose Ramon Orozco MD on 9/13/2018 at  2:26 PM  
You Were Diagnosed With   
  
 Codes Comments Medicare annual wellness visit, subsequent    -  Primary ICD-10-CM: Z00.00 ICD-9-CM: V70.0 Advanced directives, counseling/discussion     ICD-10-CM: Z71.89 ICD-9-CM: V65.49 Screening for alcoholism     ICD-10-CM: Z13.89 ICD-9-CM: V79.1 Screening for depression     ICD-10-CM: Z13.89 ICD-9-CM: V79.0 Screen for colon cancer     ICD-10-CM: Z12.11 ICD-9-CM: V76.51 Screening for diabetes mellitus     ICD-10-CM: Z13.1 ICD-9-CM: V77.1 Screening for ischemic heart disease     ICD-10-CM: Z13.6 ICD-9-CM: V81.0 Special screening for malignant neoplasm of prostate     ICD-10-CM: Z12.5 ICD-9-CM: V76.44 Vitals BP Pulse Temp Height(growth percentile) Weight(growth percentile) SpO2  
 116/72 69 98.2 °F (36.8 °C) (Oral) 5' 11\" (1.803 m) 174 lb (78.9 kg) 98% BMI Smoking Status 24.27 kg/m2 Never Smoker Vitals History BMI and BSA Data Body Mass Index Body Surface Area  
 24.27 kg/m 2 1.99 m 2 Preferred Pharmacy Pharmacy Name Phone Tomi George, [facility] [phone] Your Updated Medication List  
  
   
This list is accurate as of 9/18/18  1:58 PM.  Always use your most recent med list. ADVIL 200 mg tablet Generic drug:  ibuprofen Take 400 mg by mouth every eight (8) hours as needed for Pain. FISH OIL 1,000 mg Cap Generic drug:  omega-3 fatty acids-vitamin e Take 1 Cap by mouth.  
  
 gabapentin 300 mg capsule Commonly known as:  NEURONTIN  
TAKE 1 CAPSULE TWICE A DAY  
  
 GLUCOSAMINE CHONDROITIN PLUS PO Take  by mouth.  
  
 meclizine 25 mg tablet Commonly known as:  ANTIVERT Take 1 Tab by mouth three (3) times daily as needed. VITAMIN B-12 1,000 mcg tablet Generic drug:  cyanocobalamin Take 1,000 mcg by mouth daily. VITAMIN D3 1,000 unit Cap Generic drug:  cholecalciferol Take 2,000 Units by mouth. We Performed the Following ADVANCE CARE PLANNING FIRST 30 MINS [68044 CPT(R)] [facility] [[id number] [facility] [place]] HI ANNUAL ALCOHOL SCREEN 15 MIN [id number] [facility] [place]] HI PROSTATE CA SCREENING; NINA [ [facility] the [place]] Patient Instructions Medicare Wellness Visit, Male The best way to live healthy is to have a lifestyle where you eat a well-balanced diet, exercise regularly, limit alcohol use, and quit all forms of tobacco/nicotine, if applicable. Regular preventive services are another way to keep healthy. Preventive services (vaccines, screening tests, monitoring & exams) can help personalize your care plan, which helps you manage your own care. Screening tests can find health problems at the earliest stages, when they are easiest to treat. [facility] follows the current, evidence-based guidelines published by the [facility] Re[facility] ([facility]) when recommending preventive services for our patients. Because we follow these guidelines, sometimes recommendations change over time as research supports it. (For example, a prostate screening blood test is no longer routinely recommended for men with no symptoms.) Of course, you and your doctor may decide to screen more often for some diseases, based on your risk and co-morbidities (chronic disease you are already diagnosed with). Preventive services for you include: - Medicare offers their members a free annual wellness visit, which is time for you and your primary care provider to discuss and plan for your preventive service needs. Take advantage of this benefit every year! 
-All adults over age 72 should receive the recommended pneumonia vaccines. Current USPSTF guidelines recommend a series of two vaccines for the best pneumonia protection.  
-All adults should have a flu vaccine yearly and an ECG.  All adults age 61 and older should receive a shingles vaccine once in their lifetime.   
-All adults age 38-68 who are overweight should have a diabetes screening test once every three years.  
-Other screening tests & preventive services for persons with diabetes include: an eye exam to screen for diabetic retinopathy, a kidney function test, a foot exam, and stricter control over your cholesterol.  
-Cardiovascular screening for adults with routine risk involves an electrocardiogram (ECG) at intervals determined by the provider.  
-Colorectal cancer screening should be done for adults age 54-65 with no increased risk factors for colorectal cancer. There are a number of acceptable methods of screening for this type of cancer. Each test has its own benefits and drawbacks. Discuss with your provider what is most appropriate for you during your annual wellness visit. The different tests include: colonoscopy (considered the best screening method), a fecal occult blood test, a fecal DNA test, and sigmoidoscopy. 
-All adults born between [facility] should be screened once for Hepatitis C. 
-An Abdominal Aortic Aneurysm (AAA) Screening is recommended for men age 73-68 who has ever smoked in their lifetime. Here is a list of your current Health Maintenance items (your personalized list of preventive services) with a due date: 
Health Maintenance Due Topic Date Due  Shingles Vaccine  11/30/1989  Glaucoma Screening   01/31/1995  
 DTaP/Tdap/Td  (1 - Tdap) 01/02/2005  Flu Vaccine  08/01/2018 [place] Annual Well Visit  09/13/2018 [facility]! Dear Michael Oquendo: Thank you for requesting a [employer] account. Our records indicate that you already have an active [employer] account. You can access your account anytime at [email or web address] Did you know that you can access your hospital and ER discharge instructions at any time in [employer]? You can also review all of your test results from your hospital stay or ER visit. Additional Information If you have questions, please visit the Frequently Asked Questions section of the [employer] website at [email or web address] Remember, [employer] is NOT to be used for urgent needs. For medical emergencies, dial 911. Now available from your iPhone and Android! Please provide this summary of care documentation to your next provider. Your primary care clinician is listed as Ariana Siddiqi. If you have any questions after today's visit, please call [phone].

## 2018-09-18 NOTE — PROGRESS NOTES
1. Have you been to the ER, urgent care clinic or hospitalized since your last visit? NO.  
 
2. Have you seen or consulted any other health care providers outside of the [address] [place] [address] since your last visit (Include any pap smears or colon screening)? NO Chief Complaint Patient presents with  Annual Wellness Visit  
  with labs

## 2019-01-28 RX ORDER — GABAPENTIN 300 MG/1
CAPSULE ORAL
Qty: 180 CAP | Refills: 1 | Status: SHIPPED | OUTPATIENT
Start: 2019-01-28 | End: 2019-08-01 | Stop reason: SDUPTHER

## 2019-05-07 ENCOUNTER — OFFICE VISIT (OUTPATIENT)
Dept: INTERNAL MEDICINE CLINIC | Age: 84
End: 2019-05-07

## 2019-05-07 VITALS
RESPIRATION RATE: 14 BRPM | TEMPERATURE: 96.9 F | BODY MASS INDEX: 24.78 KG/M2 | HEART RATE: 61 BPM | WEIGHT: 177 LBS | DIASTOLIC BLOOD PRESSURE: 72 MMHG | SYSTOLIC BLOOD PRESSURE: 115 MMHG | OXYGEN SATURATION: 97 % | HEIGHT: 71 IN

## 2019-05-07 DIAGNOSIS — M54.40 BACK PAIN OF LUMBAR REGION WITH SCIATICA: Primary | ICD-10-CM

## 2019-05-07 RX ORDER — TRAMADOL HYDROCHLORIDE 50 MG/1
50 TABLET ORAL
Qty: 30 TAB | Refills: 0 | Status: SHIPPED | OUTPATIENT
Start: 2019-05-07 | End: 2019-05-08 | Stop reason: SINTOL

## 2019-05-07 RX ORDER — PREDNISONE 10 MG/1
TABLET ORAL
Qty: 21 TAB | Refills: 0 | Status: SHIPPED | OUTPATIENT
Start: 2019-05-07 | End: 2019-09-26 | Stop reason: ALTCHOICE

## 2019-05-07 NOTE — PROGRESS NOTES
Christine Frye presents today for Chief Complaint Patient presents with  Back Pain Depression Screening: 
3 most recent PHQ Screens 9/18/2018 Little interest or pleasure in doing things Not at all Feeling down, depressed, irritable, or hopeless Not at all Total Score PHQ 2 0 Learning Assessment: 
Learning Assessment 9/12/2017 PRIMARY LEARNER Patient HIGHEST LEVEL OF EDUCATION - PRIMARY LEARNER  GRADUATED HIGH SCHOOL OR GED  
BARRIERS PRIMARY LEARNER NONE  
CO-LEARNER CAREGIVER No  
PRIMARY LANGUAGE ENGLISH  
LEARNER PREFERENCE PRIMARY OTHER (COMMENT)  
  -  
ANSWERED BY patient RELATIONSHIP SELF Abuse Screening: 
Abuse Screening Questionnaire 9/12/2017 Do you ever feel afraid of your partner? Shyam Calderon Are you in a relationship with someone who physically or mentally threatens you? Shyam Calderon Is it safe for you to go home? Alma Hawkins Fall Risk Fall Risk Assessment, last 12 mths 9/18/2018 Able to walk? Yes Fall in past 12 months? No  
Fall with injury? -  
Number of falls in past 12 months - Fall Risk Score - Coordination of Care: 1. Have you been to the ER, urgent care clinic since your last visit? Hospitalized since your last visit? No  
 
2. Have you seen or consulted any other health care providers outside of the [address] [place] [address] since your last visit? Include any pap smears or colon screening.  No

## 2019-05-08 ENCOUNTER — TELEPHONE (OUTPATIENT)
Dept: INTERNAL MEDICINE CLINIC | Age: 84
End: 2019-05-08

## 2019-05-08 DIAGNOSIS — M54.42 ACUTE LEFT-SIDED LOW BACK PAIN WITH LEFT-SIDED SCIATICA: Primary | ICD-10-CM

## 2019-05-08 RX ORDER — ACETAMINOPHEN AND CODEINE PHOSPHATE 300; 30 MG/1; MG/1
1 TABLET ORAL
Qty: 30 TAB | Refills: 0 | Status: SHIPPED | OUTPATIENT
Start: 2019-05-08 | End: 2019-05-18

## 2019-05-08 NOTE — TELEPHONE ENCOUNTER
Patient is aware that tylenol 3 has been sent in to his pharmacy. No additional questions at this time.

## 2019-05-08 NOTE — PROGRESS NOTES
80 y.o. WHITE OR  male who presents for evaluation. He was helping a friend with some repairs and he bent over awkwardly to  a sheet of plywood and then noted onset of pain in the left low back. It's been radiating to the left anterolat thigh and upper calf in the interim. No weakness, numbness, paresthesias, incontinence, saddle complaints. He uses motrin 400 every day and tylenol, remains on ivelisse also. He is known to l spine disease on prior mri, saw Dr Berry Pedraza in the past. Denied any associated gi or gu complaints Past Medical History:  
Diagnosis Date  B12 deficiency  Closed compression fracture of L4 lumbar vertebra  Colon polyps Dr June Falcon  Degenerative joint disease (DJD) of lumbar spine MRI 11/14 w multilevel disease; chronic lbp w sciatica saw Dr Berry Pedraza  Distal radial fracture, right wrist 02/2015 Dillon Canter on ice. s/p ORIF with right dorsal endoplate nail, two proximal screws, and four distal pegs. Dr. Danielle Patches.  Diverticulosis   
 on CT 4/16  Erectile dysfunction  Nephrolithiasis 04/2016  
 right tiny stone on CT; microhematuria  Osteoporosis   
 ibandronate 2009-5/13 stopped due to tooth issues per his endodontist;  DEXA t score -2.4 spibe, -2.1 hip (8/11); -2.3 spine, -1.9 hip (8/13); -2.4 spine, -2.4 hip (9/15)  Proctalgia fugax  RBBB (right bundle branch block)  Right inguinal hernia Dr Fanta Araujo Current Outpatient Medications Medication Sig  predniSONE (STERAPRED DS) 10 mg dose pack See administration instruction per 10mg dose pack  traMADol (ULTRAM) 50 mg tablet Take 1 Tab by mouth every six (6) hours as needed for Pain for up to 14 days. Max Daily Amount: 200 mg.  
 gabapentin (NEURONTIN) 300 mg capsule TAKE 1 CAPSULE TWICE A DAY  ibuprofen (ADVIL) 200 mg tablet Take 400 mg by mouth every eight (8) hours as needed for Pain.   
 GLUC/CHND/OM3/DHA/EPA/FISH/STR (GLUCOSAMINE CHONDROITIN PLUS PO) Take by mouth.  cyanocobalamin (VITAMIN B-12) 1,000 mcg tablet Take 1,000 mcg by mouth daily.  omega-3 fatty acids-vitamin e (FISH OIL) 1,000 mg Cap Take 1 Cap by mouth.  Cholecalciferol, Vitamin D3, (VITAMIN D) 1,000 unit Cap Take 2,000 Units by mouth.  meclizine (ANTIVERT) 25 mg tablet Take 1 Tab by mouth three (3) times daily as needed. (Patient taking differently: Take 25 mg by mouth three (3) times daily as needed. Indications: not taking) No current facility-administered medications for this visit. Allergies Allergen Reactions  Sulfa (Sulfonamide Antibiotics) Nausea Only Visit Vitals /72 Pulse 61 Temp 96.9 °F (36.1 °C) (Oral) Resp 14 Ht 5' 11\" (1.803 m) Wt 177 lb (80.3 kg) SpO2 97% BMI 24.69 kg/m²  
back showed no cvat, no clear bony tenderness, no si joint tenderness. Neg straight leg on the right, equivocal on the left. No pain on rotation of the hips or palpation of the trochanters. Reflexes, pulses intact and symmetrical, downgoing toes Assessment and plan: 1. Back pain, probable sciatica in pt with known lumbar disease. Quick discussion; will do pred taper, tramadol prn. If no better, will see, get imaging and possibly send to spine Above conditions discussed at length and patient vocalized understanding. All questions answered to patient satisfaction

## 2019-05-14 ENCOUNTER — TELEPHONE (OUTPATIENT)
Dept: INTERNAL MEDICINE CLINIC | Age: 84
End: 2019-05-14

## 2019-05-14 DIAGNOSIS — M54.50 ACUTE MIDLINE LOW BACK PAIN WITHOUT SCIATICA: Primary | ICD-10-CM

## 2019-05-15 NOTE — TELEPHONE ENCOUNTER
Received call at 8:43pm from patient. Reports having continued back pain despite completing prednisone dose pack and taking Tylenol with codeine #3. Advised that may use ibuprofen 400 mg tid for pain in addition to Tylenol with codeine. Patient questioning if may need further evaluation with MRI, and advised that will send message to PCP to decide.

## 2019-05-17 ENCOUNTER — HOSPITAL ENCOUNTER (OUTPATIENT)
Dept: GENERAL RADIOLOGY | Age: 84
Discharge: HOME | End: 2019-05-17
Payer: MEDICARE

## 2019-05-17 DIAGNOSIS — M54.50 ACUTE MIDLINE LOW BACK PAIN WITHOUT SCIATICA: ICD-10-CM

## 2019-05-17 PROCEDURE — 72100 X-RAY EXAM L-S SPINE 2/3 VWS: CPT

## 2019-05-19 ENCOUNTER — TELEPHONE (OUTPATIENT)
Dept: INTERNAL MEDICINE CLINIC | Age: 84
End: 2019-05-19

## 2019-05-19 DIAGNOSIS — M54.40 CHRONIC MIDLINE LOW BACK PAIN WITH SCIATICA, SCIATICA LATERALITY UNSPECIFIED: Primary | ICD-10-CM

## 2019-05-19 DIAGNOSIS — S32.040A CLOSED COMPRESSION FRACTURE OF FOURTH LUMBAR VERTEBRA, INITIAL ENCOUNTER: ICD-10-CM

## 2019-05-19 DIAGNOSIS — G89.29 CHRONIC MIDLINE LOW BACK PAIN WITH SCIATICA, SCIATICA LATERALITY UNSPECIFIED: Primary | ICD-10-CM

## 2019-05-20 NOTE — TELEPHONE ENCOUNTER
pls call    IMPRESSION:  1. Age indeterminate compression deformities at L1 and L2, new since 4/2016.    2. Chronic compression deformities at T11 and L3-L5.  -Questionable acute on chronic compression fracture L4, with interval increase  loss of vertebral body height at that level. -MRI may be helpful for further evaluation. 3. Degenerative findings are as described. 4. Osteopenia.     There may be new fractures in the l spine  The L4 fx may explain the sx  Mri can help us determine if it's new and compressing a nerve - we can schedule if he's amenable

## 2019-05-20 NOTE — TELEPHONE ENCOUNTER
pt wanted to make you are that he has been seeing a chiropractor for his back     he is also interested in MRI.

## 2019-05-24 NOTE — TELEPHONE ENCOUNTER
Pt is asking to speak with nurse about the MRI ordered.   He is following Chiropractor is asking if he should go forward with the test

## 2019-06-03 ENCOUNTER — HOSPITAL ENCOUNTER (OUTPATIENT)
Dept: MRI IMAGING | Age: 84
Discharge: HOME | End: 2019-06-03
Attending: INTERNAL MEDICINE
Payer: MEDICARE

## 2019-06-03 DIAGNOSIS — S32.040A CLOSED COMPRESSION FRACTURE OF FOURTH LUMBAR VERTEBRA, INITIAL ENCOUNTER: ICD-10-CM

## 2019-06-03 DIAGNOSIS — G89.29 CHRONIC MIDLINE LOW BACK PAIN WITH SCIATICA, SCIATICA LATERALITY UNSPECIFIED: ICD-10-CM

## 2019-06-03 DIAGNOSIS — M54.40 CHRONIC MIDLINE LOW BACK PAIN WITH SCIATICA, SCIATICA LATERALITY UNSPECIFIED: ICD-10-CM

## 2019-06-03 PROCEDURE — 72148 MRI LUMBAR SPINE W/O DYE: CPT

## 2019-06-04 ENCOUNTER — TELEPHONE (OUTPATIENT)
Dept: INTERNAL MEDICINE CLINIC | Age: 84
End: 2019-06-04

## 2019-06-04 DIAGNOSIS — S32.040A CLOSED COMPRESSION FRACTURE OF FOURTH LUMBAR VERTEBRA, INITIAL ENCOUNTER: Primary | ICD-10-CM

## 2019-06-04 NOTE — TELEPHONE ENCOUNTER
pls call    IMPRESSION: l spine mri  1. Active marrow edema in the compressed  L4, extending to the posterior  elements. No significant retropulsion. No definite central stenosis. Foraminal  narrowing at L4-L5, with possible compression of right exiting L4 nerve root. 2. Additional findings as described.     Appears to be acurte compression fx as suspected  Would suggest spine specialist NOT chiropractor for further eval  He has seen dr Elías Faustin group in past - sched first available if amenable

## 2019-06-04 NOTE — TELEPHONE ENCOUNTER
Called and got patient scheduled with Dr. Kole Magaña for the Urgent referral that was placed for orthopedics. Was advised by Moustapha Fonseca at [facility] that the patient is scheduled for Friday 06/07/2019. Patient to arrive at 7:45am to the [facility] address. Address to the office: [address] [place], [address] Was advised to bring Disc or Films of MRI. Patient can  a copy where he got the MRI completed at. Called the patient again and talked to the patient about the message. Patient verbalized understanding and repeated the information to me to make sure he had the corrected information. Patient didn't have any additional questions.

## 2019-06-04 NOTE — TELEPHONE ENCOUNTER
Pt calling back asking that RD call him to discuss the results. Says he has a few more questions. Refused ov to discuss says it can be done with a simple phone call.

## 2019-06-04 NOTE — TELEPHONE ENCOUNTER
Patient contacted, patient identified with two identifiers (Name & ). Patient aware of results per DR. Weldon and verbalizes understanding.

## 2019-06-06 ENCOUNTER — TELEPHONE (OUTPATIENT)
Dept: INTERNAL MEDICINE CLINIC | Age: 84
End: 2019-06-06

## 2019-06-06 NOTE — TELEPHONE ENCOUNTER
Left detailed voicemail advising  that Marianna Duff was out of office and if it was something emergent give us a call back.

## 2019-06-06 NOTE — TELEPHONE ENCOUNTER
Dr Etienne Dickerson is asking Dr Kacey Lopez to call him back regarding this patient - Dr Silvio Castillo cell # [phone]

## 2019-06-07 ENCOUNTER — OFFICE VISIT (OUTPATIENT)
Dept: ORTHOPEDIC SURGERY | Age: 84
End: 2019-06-07

## 2019-06-07 VITALS
TEMPERATURE: 98.3 F | RESPIRATION RATE: 24 BRPM | BODY MASS INDEX: 24.36 KG/M2 | SYSTOLIC BLOOD PRESSURE: 118 MMHG | OXYGEN SATURATION: 97 % | HEART RATE: 64 BPM | HEIGHT: 71 IN | DIASTOLIC BLOOD PRESSURE: 73 MMHG | WEIGHT: 174 LBS

## 2019-06-07 DIAGNOSIS — M51.36 DDD (DEGENERATIVE DISC DISEASE), LUMBAR: ICD-10-CM

## 2019-06-07 DIAGNOSIS — S32.040A CLOSED COMPRESSION FRACTURE OF FOURTH LUMBAR VERTEBRA, INITIAL ENCOUNTER: Primary | ICD-10-CM

## 2019-06-07 NOTE — PROGRESS NOTES
1. Have you been to the ER, urgent care clinic since your last visit? Hospitalized since your last visit? No    2. Have you seen or consulted any other health care providers outside of the [address] [place] [address] since your last visit? Include any pap smears or colon screening.  No

## 2019-06-07 NOTE — LETTER
6/7/19 Patient: Abram Pepper YOB: 1930 Date of Visit: 6/7/2019 Silvestre Mayorga MD 
[address] [place] [address] VIA In Basket Dear Silvestre Mayorga MD, Thank you for referring Mr. Alfredo Harper to [address] [place] [address] Drive for evaluation. My notes for this consultation are attached. If you have questions, please do not hesitate to call me. I look forward to following your patient along with you. Sincerely, Tatum Ortiz MD

## 2019-06-07 NOTE — PROGRESS NOTES
[facility]  [address], [place] [address]  Phone: [phone]  Fax: [phone]        INITIAL CONSULTATION      HISTORY OF PRESENT ILLNESS:  Cammy Rubin is a 80 y.o. male whom is referred from Mark Chan MD secondary to acute onset of left-sided low back pain since the beginning of May 2019. He rates his pain 3/10. Pt reports onset of sxs after bending down to  a board. He states his pain has improved since onset. Reviewing the records, it appears as though the pt has had some chronic issues with his low back, as he is on Neurontin. Pt denies h/o spinal surgery or PT. Pt reports having blocks in 2014 with relief. He has been treated with MDP with relief and Tylenol #3 without relief. He self-treats with Motrin prn. Pt denies change in bowel or bladder habits. Pt denies fever, weight loss, or skin changes. Previously seen by Dr. Shadi Burnett 11/24/14 with c/o left-sided low back pain, which occurred after bending over to  a box. MRI revealed compression fracture at L4. Note from Mark Chan MD dated 5/7/19 indicating patient was seen with c/o left-low back pain extending into the left lateral thigh. Of note, pt previously saw Dr. Shadi Burnett. Treated with MDP and Tramadol at that time. Lumbar spine XR dated 5/16/19 reviewed. Per report, Age indeterminate compression deformities at L1 and L2, new since 4/2016. Chronic compression deformities at T11 and L3-L5. Questionable acute on chronic compression fracture L4, with interval increase loss of vertebral body height at that level. MRI may be helpful for further evaluation. Degenerative findings are as described. Osteopenia. Lumbar spine MRI dated 6/3/19 reviewed. Per report, active marrow edema in the compressed  L4, extending to the posterior elements. No significant retropulsion. No definite central stenosis.  Foraminal narrowing at L4-L5, with possible compression of right exiting L4 nerve root. Additional findings as described. L1-L2: Minimal posterior disc bulge with no central stenosis. Facet hypertrophy with moderately severe bilateral foraminal stenosis but no definite exiting nerve root compression. Stable. L2-L3: Mild posterior lateral corner disc protrusion. Facet ligamentous hypertrophy. AP canal measures 11 mm. Posterior lateral indentation of thecal sac from facet and ligamentous hypertrophy. Moderate bilateral foraminal stenosis with no definite exiting nerve root compression. Small synovial cyst from the left facet joint, partially protruding into the central canal with minimal indentation of the thecal sac. L3-L4: Posterior disc bulge. Facet and ligamentous hypertrophy. No significant central stenosis. Moderate bilateral foraminal stenosis. L4-L5: Compression deformity and marrow edema as described. Mild posterior displacement of the anterior epidural fat at the vertebral body level. AP canal measures 11.4 mm. At the disc level, no focal disc herniation. Facet and ligamentous hypertrophy with moderately severe right and moderate left foraminal stenosis. Compression of the right exiting L4 nerve root possible. Stable. Interspinous edema posteriorly at L3-L4 and L4-L5. L5-S1: Posterior disc bulge. Mild contact to S1 nerve roots. No displacement. No central stenosis. Moderate bilateral foraminal stenosis. No definite exiting L5 root compression. Upon reviewing myself, there is evidence of possible compression fractures at L2 and L3, which appear to be chronic. The patient is RHD.  reviewed. Body mass index is 24.27 kg/m².     PCP: Melany Prince MD    Past Medical History:   Diagnosis Date    B12 deficiency     Closed compression fracture of L4 lumbar vertebra     Colon polyps     Dr Katie Arana    Degenerative joint disease (DJD) of lumbar spine     MRI 11/14 w multilevel disease; chronic lbp w sciatica saw Dr Hiwot Andrade    Distal radial fracture, right wrist 02/2015    Lydia Brjerrod on ice. s/p ORIF with right dorsal endoplate nail, two proximal screws, and four distal pegs. Dr. Adonis Carlson.  Diverticulosis     on CT 4/16    Erectile dysfunction     Nephrolithiasis 04/2016    right tiny stone on CT; microhematuria    Osteoporosis     ibandronate 2009-5/13 stopped due to tooth issues per his endodontist;  DEXA t score -2.4 spibe, -2.1 hip (8/11); -2.3 spine, -1.9 hip (8/13); -2.4 spine, -2.4 hip (9/15)    Proctalgia fugax     RBBB (right bundle branch block)     Right inguinal hernia     Dr Jonathon Scott          Past Surgical History:   Procedure Laterality Date    Taras Adams HX COLONOSCOPY      Dr Caprice Alvarez 2012 negative    HX HERNIA REPAIR  2002    [facility] repair         Social History     Tobacco Use    Smoking status: Never Smoker    Smokeless tobacco: Never Used   Substance Use Topics    Alcohol use: No     Work status: The patient is retired. Marital status: The patient is . Current Outpatient Medications   Medication Sig Dispense Refill    gabapentin (NEURONTIN) 300 mg capsule TAKE 1 CAPSULE TWICE A  Cap 1    meclizine (ANTIVERT) 25 mg tablet Take 1 Tab by mouth three (3) times daily as needed. (Patient taking differently: Take 25 mg by mouth three (3) times daily as needed. Indications: not taking) 30 Tab 0    ibuprofen (ADVIL) 200 mg tablet Take 400 mg by mouth every eight (8) hours as needed for Pain.  GLUC/CHND/OM3/DHA/EPA/FISH/STR (GLUCOSAMINE CHONDROITIN PLUS PO) Take  by mouth.  cyanocobalamin (VITAMIN B-12) 1,000 mcg tablet Take 1,000 mcg by mouth daily.  omega-3 fatty acids-vitamin e (FISH OIL) 1,000 mg Cap Take 1 Cap by mouth.  Cholecalciferol, Vitamin D3, (VITAMIN D) 1,000 unit Cap Take 2,000 Units by mouth.       predniSONE (STERAPRED DS) 10 mg dose pack See administration instruction per 10mg dose pack 21 Tab 0       Allergies   Allergen Reactions    Sulfa (Sulfonamide Antibiotics) Nausea Only    Tramadol Nausea and Vomiting          History reviewed. No pertinent family history. REVIEW OF SYSTEMS  Constitutional symptoms: Negative  Eyes: Negative  Ears, Nose, Throat, and Mouth: Negative  Cardiovascular: Negative  Respiratory: Negative  Genitourinary: Negative  Integumentary (Skin and/or breast): Negative  Musculoskeletal: Positive for left-sided low back pain  Extremities: Negative for edema. Endocrine/Rheumatologic: Negative  Hematologic/Lymphatic: Negative  Allergic/Immunologic: Negative  Psychiatric: Negative       PHYSICAL EXAMINATION  Visit Vitals  /73   Pulse 64   Temp 98.3 °F (36.8 °C)   Resp 24   Ht 5' 11\" (1.803 m)   Wt 174 lb (78.9 kg)   SpO2 97%   BMI 24.27 kg/m²       CONSTITUTIONAL: NAD, A&O x 3  HEART: Regular rate and rhythm  ABDOMEN: Positive bowel sounds, soft, nontender, and nondistended  LUNGS: Clear to auscultation bilaterally. SKIN: Negative for rash. RANGE OF MOTION: The patient has full passive range of motion in all four extremities. SENSATION: Sensation is intact to light touch throughout. MOTOR:   Straight Leg Raise: Negative, bilateral  Can: Negative, bilateral  Deep tendon reflexes are 0 at the brachioradialis, biceps, and triceps. Deep tendon reflexes are 0 at the knees and ankles bilaterally. Shoulder AB/Flex Elbow Flex Wrist Ext Elbow Ext Wrist Flex Hand Intrin Tone   Right +4/5 +4/5 +4/5 +4/5 +4/5 +4/5 +4/5   Left +4/5 +4/5 +4/5 +4/5 +4/5 +4/5 +4/5              Hip Flex Knee Ext Knee Flex Ankle DF GTE Ankle PF Tone   Right +4/5 +4/5 +4/5 +4/5 +4/5 +4/5 +4/5   Left +4/5 +4/5 +4/5 +4/5 +4/5 +4/5 +4/5         ASSESSMENT   Diagnoses and all orders for this visit:    1. Closed compression fracture of fourth lumbar vertebra, initial encounter ([facility] [address]    2. DDD (degenerative disc disease), lumbar         IMPRESSIONS/RECOMMENDATIONS:  Patient presents today with c/o acute onset of left-sided low back pain since early May 2019.  He reports onset of sxs after bending down to  a board. Patient states his pain has improved since onset. MRI of the lumbar spine reviewed a compression fracture of L4. I did explain to the patient the natural course of healing for these types of injuries. We discussed obtaining a bone density in the future once the fracture has healed. I recommend he continue to treat with OTC Motrin prn. Multiple treatment options were discussed. Patient is neurologically intact. I will see the patient back in 7 week's time or earlier if needed. Written by Austin Arechiga, as dictated by Derik Guerin MD  I examined the patient, reviewed and agree with the note.

## 2019-06-10 NOTE — TELEPHONE ENCOUNTER
Pt stated at his visit with Ishaanmnoa Frazier on Friday all of his questions was answered and everything from MRI was explained. No further questions or concerns at this time.

## 2019-08-01 ENCOUNTER — OFFICE VISIT (OUTPATIENT)
Dept: ORTHOPEDIC SURGERY | Age: 84
End: 2019-08-01

## 2019-08-01 VITALS
SYSTOLIC BLOOD PRESSURE: 118 MMHG | TEMPERATURE: 97.5 F | OXYGEN SATURATION: 99 % | DIASTOLIC BLOOD PRESSURE: 71 MMHG | BODY MASS INDEX: 24.05 KG/M2 | HEIGHT: 71 IN | HEART RATE: 72 BPM | WEIGHT: 171.8 LBS

## 2019-08-01 DIAGNOSIS — M51.36 DDD (DEGENERATIVE DISC DISEASE), LUMBAR: Primary | ICD-10-CM

## 2019-08-01 DIAGNOSIS — S32.040A CLOSED WEDGE COMPRESSION FRACTURE OF FOURTH LUMBAR VERTEBRA, INITIAL ENCOUNTER: ICD-10-CM

## 2019-08-01 DIAGNOSIS — M54.16 LUMBAR NEURITIS: ICD-10-CM

## 2019-08-01 RX ORDER — GABAPENTIN 300 MG/1
300 CAPSULE ORAL
Qty: 90 CAP | Refills: 1 | Status: SHIPPED | OUTPATIENT
Start: 2019-08-01 | End: 2019-08-02 | Stop reason: SDUPTHER

## 2019-08-01 NOTE — LETTER
8/1/19 Patient: Michelle Jones YOB: 1930 Date of Visit: 8/1/2019 Bebeto Davey MD 
[address] [place], [place] [id number] VIA In Basket Dear Bebeto Davey MD, Thank you for referring Mr. Gisela Reeder to [address] [place] [address] for evaluation. My notes for this consultation are attached. If you have questions, please do not hesitate to call me. I look forward to following your patient along with you. Sincerely, Beverly Thorne MD

## 2019-08-01 NOTE — PROGRESS NOTES
[facility]  [address], [place] [address]   Phone: [phone]  Fax: [phone]        PROGRESS NOTE      HISTORY OF PRESENT ILLNESS:  The patient is a 80 y.o. male and was seen today for follow up of acute onset of left-sided low back pain since the beginning of May 2019. Pt reports onset of sxs after bending down to  a board. He states his pain has improved since onset. Reviewing the records, it appears as though the pt has had some chronic issues with his low back, as he is on Neurontin. Pt denies h/o spinal surgery or PT. Pt reports having blocks in 2014 with relief. He has been treated with MDP with relief and Tylenol #3 without relief. He self-treats with Motrin prn. Pt denies change in bowel or bladder habits. Pt denies fever, weight loss, or skin changes. The patient is RHD. Previously seen by Dr. Deborah Campa 11/24/14 with c/o left-sided low back pain, which occurred after bending over to  a box. MRI revealed compression fracture at L4. Note from Valdemar Allen MD dated 5/7/19 indicating patient was seen with c/o left-low back pain extending into the left lateral thigh. Of note, pt previously saw Dr. Deborah Campa. Treated with MDP and Tramadol at that time. Lumbar spine XR dated 5/16/19 reviewed. Per report, Age indeterminate compression deformities at L1 and L2, new since 4/2016. Chronic compression deformities at T11 and L3-L5. Questionable acute on chronic compression fracture L4, with interval increase loss of vertebral body height at that level. MRI may be helpful for further evaluation. Degenerative findings are as described. Osteopenia. Lumbar spine MRI dated 6/3/19 reviewed. Per report, active marrow edema in the compressed  L4, extending to the posterior elements. No significant retropulsion. No definite central stenosis. Foraminal narrowing at L4-L5, with possible compression of right exiting L4 nerve root. Additional findings as described. L1-L2: Minimal posterior disc bulge with no central stenosis. Facet hypertrophy with moderately severe bilateral foraminal stenosis but no definite exiting nerve root compression. Stable. L2-L3: Mild posterior lateral corner disc protrusion. Facet ligamentous hypertrophy. AP canal measures 11 mm. Posterior lateral indentation of thecal sac from facet and ligamentous hypertrophy. Moderate bilateral foraminal stenosis with no definite exiting nerve root compression. Small synovial cyst from the left facet joint, partially protruding into the central canal with minimal indentation of the thecal sac. L3-L4: Posterior disc bulge. Facet and ligamentous hypertrophy. No significant central stenosis. Moderate bilateral foraminal stenosis. L4-L5: Compression deformity and marrow edema as described. Mild posterior displacement of the anterior epidural fat at the vertebral body level. AP canal measures 11.4 mm. At the disc level, no focal disc herniation. Facet and ligamentous hypertrophy with moderately severe right and moderate left foraminal stenosis. Compression of the right exiting L4 nerve root possible. Stable. Interspinous edema posteriorly at L3-L4 and L4-L5. L5-S1: Posterior disc bulge. Mild contact to S1 nerve roots. No displacement. No central stenosis. Moderate bilateral foraminal stenosis. No definite exiting L5 root compression. Upon reviewing myself, there is evidence of possible compression fractures at L2 and L3, which appear to be chronic. At his last clinical appointment, patient presented with c/o acute onset of left-sided low back pain since early May 2019. He reports onset of sxs after bending down to  a board. Patient stated his pain has improved since onset. MRI of the lumbar spine reviewed a compression fracture of L4. I did explain to the patient the natural course of healing for these types of injuries. We discussed obtaining a bone density in the future once the fracture has healed.  I recommended he continue to treat with OTC Motrin prn.       The patient returns today with pain location and distribution remain unchanged. He rates his pain 0/10, previously 3/10. He reports a new c/o paraesthesias in the RLE from the knee down to the foot x 2 weeks. He reports his initial back sxs have settled down. Pt is complaint with Neurontin 300 mg BID as prescribed by another physician. Pt denies change in bowel or bladder habits.  reviewed. Body mass index is 23.96 kg/m². PCP: Colton Arreaga MD      Past Medical History:   Diagnosis Date    B12 deficiency     Closed compression fracture of L4 lumbar vertebra     Colon polyps     Dr Leah Cuenca    Degenerative joint disease (DJD) of lumbar spine     MRI 11/14 w multilevel disease; chronic lbp w sciatica saw Dr Jonathon Olvera    Distal radial fracture, right wrist 02/2015    Fell on ice. s/p ORIF with right dorsal endoplate nail, two proximal screws, and four distal pegs. Dr. Sheng Bocanegra.     Diverticulosis     on CT 4/16    Erectile dysfunction     Nephrolithiasis 04/2016    right tiny stone on CT; microhematuria    Osteoporosis     ibandronate 2009-5/13 stopped due to tooth issues per his endodontist;  DEXA t score -2.4 spibe, -2.1 hip (8/11); -2.3 spine, -1.9 hip (8/13); -2.4 spine, -2.4 hip (9/15)    Proctalgia fugax     RBBB (right bundle branch block)     Right inguinal hernia     Dr Javier Peng History     Socioeconomic History    Marital status:      Spouse name: Not on file    Number of children: Not on file    Years of education: Not on file    Highest education level: Not on file   Occupational History    Not on file   Social Needs    Financial resource strain: Not on file    Food insecurity:     Worry: Not on file     Inability: Not on file    Transportation needs:     Medical: Not on file     Non-medical: Not on file   Tobacco Use    Smoking status: Never Smoker    Smokeless tobacco: Never Used Substance and Sexual Activity    Alcohol use: No    Drug use: No    Sexual activity: Not Currently   Lifestyle    Physical activity:     Days per week: Not on file     Minutes per session: Not on file    Stress: Not on file   Relationships    Social connections:     Talks on phone: Not on file     Gets together: Not on file     Attends [employer] service: Not on file     Active member of club or organization: Not on file     Attends meetings of clubs or organizations: Not on file     Relationship status: Not on file    Intimate partner violence:     Fear of current or ex partner: Not on file     Emotionally abused: Not on file     Physically abused: Not on file     Forced sexual activity: Not on file   Other Topics Concern    Not on file   Social History Narrative    Not on file       Current Outpatient Medications   Medication Sig Dispense Refill    TURMERIC PO Take  by mouth.  gabapentin (NEURONTIN) 300 mg capsule TAKE 1 CAPSULE TWICE A  Cap 1    ibuprofen (ADVIL) 200 mg tablet Take 400 mg by mouth every eight (8) hours as needed for Pain.  GLUC/CHND/OM3/DHA/EPA/FISH/STR (GLUCOSAMINE CHONDROITIN PLUS PO) Take  by mouth.  cyanocobalamin (VITAMIN B-12) 1,000 mcg tablet Take 1,000 mcg by mouth daily.  omega-3 fatty acids-vitamin e (FISH OIL) 1,000 mg Cap Take 1 Cap by mouth.  Cholecalciferol, Vitamin D3, (VITAMIN D) 1,000 unit Cap Take 2,000 Units by mouth.  predniSONE (STERAPRED DS) 10 mg dose pack See administration instruction per 10mg dose pack (Patient not taking: Reported on 8/1/2019) 21 Tab 0    meclizine (ANTIVERT) 25 mg tablet Take 1 Tab by mouth three (3) times daily as needed. (Patient taking differently: Take 25 mg by mouth three (3) times daily as needed.  Indications: not taking) 30 Tab 0       Allergies   Allergen Reactions    Sulfa (Sulfonamide Antibiotics) Nausea Only    Tramadol Nausea and Vomiting          PHYSICAL EXAMINATION    Visit Vitals  /71 (BP 1 Location: Left arm, BP Patient Position: Sitting)   Pulse 72   Temp 97.5 °F (36.4 °C) (Oral)   Ht 5' 11\" (1.803 m)   Wt 171 lb 12.8 oz (77.9 kg)   SpO2 99%   BMI 23.96 kg/m²       CONSTITUTIONAL: NAD, A&O x 3  SENSATION: Intact to light touch throughout  RANGE OF MOTION: The patient has full passive range of motion in all four extremities. MOTOR:  Straight Leg Raise: Negative, bilateral     Shoulder AB/Flex Elbow Flex Wrist Ext Elbow Ext Wrist Flex Hand Intrin Tone   Right +4/5 +4/5 +4/5 +4/5 +4/5 +4/5 +4/5   Left +4/5 +4/5 +4/5 +4/5 +4/5 +4/5 +4/5                 ASSESSMENT   Diagnoses and all orders for this visit:    1. DDD (degenerative disc disease), lumbar    2. Lumbar neuritis    3. [facility] wedge [facility] fourth [facility]          IMPRESSION AND PLAN:   Patient returns today with a new c/o paraesthesias in the distal RLE, that he had experienced before the fracture but has had recent increase x 2 weeks. He reports his initial back sxs have settled down. 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 I will increase his dose of Neurontin 300 mg TID. Patient advised to call the office if intolerant to higher dose. Consideration was given to refer him for an EMG, but pt deferred. Patient is neurologically intact. I will see the patient back in 1 month's time or earlier if needed. Written by Donta Vidal, as dictated by Adam Hutchison MD  I examined the patient, reviewed and agree with the note.

## 2019-08-02 DIAGNOSIS — S32.040A CLOSED WEDGE COMPRESSION FRACTURE OF FOURTH LUMBAR VERTEBRA, INITIAL ENCOUNTER: ICD-10-CM

## 2019-08-02 DIAGNOSIS — M54.16 LUMBAR NEURITIS: ICD-10-CM

## 2019-08-02 DIAGNOSIS — M51.36 DDD (DEGENERATIVE DISC DISEASE), LUMBAR: ICD-10-CM

## 2019-08-02 NOTE — TELEPHONE ENCOUNTER
PLEASE FAX TO [facility]    I am no longer authorized to pull PMPs for Dr. Osman Rosenberg. Last Visit: 03/28/2019 with [place] Haines  Next Appointment: 10/02/2019 with MD Mady Herbert    Requested Prescriptions     Pending Prescriptions Disp Refills    gabapentin (NEURONTIN) 300 mg capsule 270 Cap 1     Sig: Take 1 Cap by mouth three (3) times daily (with meals). Max Daily Amount: 900 mg.  Indications: Neuropathic Pain

## 2019-08-05 RX ORDER — GABAPENTIN 300 MG/1
300 CAPSULE ORAL
Qty: 270 CAP | Refills: 1 | Status: SHIPPED | OUTPATIENT
Start: 2019-08-05 | End: 2020-03-30 | Stop reason: SDUPTHER

## 2019-09-12 ENCOUNTER — OFFICE VISIT (OUTPATIENT)
Dept: ORTHOPEDIC SURGERY | Age: 84
End: 2019-09-12

## 2019-09-12 VITALS
WEIGHT: 168 LBS | BODY MASS INDEX: 23.52 KG/M2 | HEART RATE: 65 BPM | DIASTOLIC BLOOD PRESSURE: 65 MMHG | SYSTOLIC BLOOD PRESSURE: 121 MMHG | HEIGHT: 71 IN

## 2019-09-12 DIAGNOSIS — M51.36 DDD (DEGENERATIVE DISC DISEASE), LUMBAR: Primary | ICD-10-CM

## 2019-09-12 DIAGNOSIS — M54.16 LUMBAR NEURITIS: ICD-10-CM

## 2019-09-12 DIAGNOSIS — S32.040A CLOSED WEDGE COMPRESSION FRACTURE OF FOURTH LUMBAR VERTEBRA, INITIAL ENCOUNTER: ICD-10-CM

## 2019-09-12 NOTE — LETTER
9/12/19 Patient: Stephen Rogers YOB: 1930 Date of Visit: 9/12/2019 Sima Boykin MD 
[address] [place] [address] VIA In Basket Dear Sima Boykin MD, Thank you for referring Mr. Kassandra Marshall to [address] [place] [address] for evaluation. My notes for this consultation are attached. If you have questions, please do not hesitate to call me. I look forward to following your patient along with you. Sincerely, Abimael Gillespie MD

## 2019-09-12 NOTE — PROGRESS NOTES
[facility]  [address], [place]   Phone: [phone]  Fax: [phone]        PROGRESS NOTE      HISTORY OF PRESENT ILLNESS:  The patient is a 80 y.o. male and was seen today for follow up of new c/o paraesthesias in the RLE from the knee down to the foot x 2 weeks. He reports his initial back sxs have settled down. Initially had c/o acute onset of left-sided low back pain since the beginning of May 2019. Pt reports onset of sxs after bending down to  a board. He states his pain has improved since onset. Reviewing the records, it appears as though the pt has had some chronic issues with his low back, as he is on Neurontin. Pt denies h/o spinal surgery or PT. Pt reports having blocks in 2014 with relief. He has been treated with MDP with relief and Tylenol #3 without relief. He self-treats with Motrin prn. Pt denies change in bowel or bladder habits. Pt denies fever, weight loss, or skin changes. The patient is RHD. Previously seen by Dr. Kimi Silva 11/24/14 with c/o left-sided low back pain, which occurred after bending over to  a box. MRI revealed compression fracture at L4. Note from Brody Weldon MD dated 5/7/19 indicating patient was seen with c/o left-low back pain extending into the left lateral thigh. Of note, pt previously saw Dr. Kimi Silva. Treated with MDP and Tramadol at that time. Lumbar spine XR dated 5/16/19 reviewed. Per report, Age indeterminate compression deformities at L1 and L2, new since 4/2016. Chronic compression deformities at T11 and L3-L5. Questionable acute on chronic compression fracture L4, with interval increase loss of vertebral body height at that level. MRI may be helpful for further evaluation. Degenerative findings are as described. Osteopenia. Lumbar spine MRI dated 6/3/19 reviewed. Per report, active marrow edema in the compressed  L4, extending to the posterior elements. No significant retropulsion.  No definite central stenosis. Foraminal narrowing at L4-L5, with possible compression of right exiting L4 nerve root. Additional findings as described. L1-L2: Minimal posterior disc bulge with no central stenosis. Facet hypertrophy with moderately severe bilateral foraminal stenosis but no definite exiting nerve root compression. Stable. L2-L3: Mild posterior lateral corner disc protrusion. Facet ligamentous hypertrophy. AP canal measures 11 mm. Posterior lateral indentation of thecal sac from facet and ligamentous hypertrophy. Moderate bilateral foraminal stenosis with no definite exiting nerve root compression. Small synovial cyst from the left facet joint, partially protruding into the central canal with minimal indentation of the thecal sac. L3-L4: Posterior disc bulge. Facet and ligamentous hypertrophy. No significant central stenosis. Moderate bilateral foraminal stenosis. L4-L5: Compression deformity and marrow edema as described. Mild posterior displacement of the anterior epidural fat at the vertebral body level. AP canal measures 11.4 mm. At the disc level, no focal disc herniation. Facet and ligamentous hypertrophy with moderately severe right and moderate left foraminal stenosis. Compression of the right exiting L4 nerve root possible. Stable. Interspinous edema posteriorly at L3-L4 and L4-L5. L5-S1: Posterior disc bulge. Mild contact to S1 nerve roots. No displacement. No central stenosis. Moderate bilateral foraminal stenosis. No definite exiting L5 root compression. Upon reviewing myself, there is evidence of possible compression fractures at L2 and L3, which appear to be chronic. At his last clinical appointment, patient returned with a new c/o paraesthesias in the distal RLE, that he had experienced before the fracture but has had recent increase x 2 weeks. He reported his initial back sxs had settled down. 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  I increased his dose of Neurontin 300 mg TID. Consideration was given to refer him for an EMG, but pt deferred. The patient returns today with pain location and distribution remain unchanged. He continues to rate his pain 0/10. Pt is tolerating increased dose of Neurontin to 300 mg TID with benefit. He is still experiencing the paraesthesias in the distal RLE, but has improved since increasing his medication. He reports his back sxs continue to do well. Pt denies change in bowel or bladder habits.  reviewed. Body mass index is 23.43 kg/m². PCP: Richie Schmitt MD      Past Medical History:   Diagnosis Date    B12 deficiency     Closed compression fracture of L4 lumbar vertebra     Colon polyps     Dr Jodie Yanes    Degenerative joint disease (DJD) of lumbar spine     MRI 11/14 w multilevel disease; chronic lbp w sciatica saw Dr Herman Riojas    Distal radial fracture, right wrist 02/2015    Fell on ice. s/p ORIF with right dorsal endoplate nail, two proximal screws, and four distal pegs. Dr. Jerri Pugh.     Diverticulosis     on CT 4/16    Erectile dysfunction     Nephrolithiasis 04/2016    right tiny stone on CT; microhematuria    Osteoporosis     ibandronate 2009-5/13 stopped due to tooth issues per his endodontist;  DEXA t score -2.4 spibe, -2.1 hip (8/11); -2.3 spine, -1.9 hip (8/13); -2.4 spine, -2.4 hip (9/15)    Proctalgia fugax     RBBB (right bundle branch block)     Right inguinal hernia     Dr Edwyna Sandifer History     Socioeconomic History    Marital status:      Spouse name: Not on file    Number of children: Not on file    Years of education: Not on file    Highest education level: Not on file   Occupational History    Not on file   Social Needs    Financial resource strain: Not on file    Food insecurity:     Worry: Not on file     Inability: Not on file    Transportation needs:     Medical: Not on file     Non-medical: Not on file   Tobacco Use    Smoking status: Never Smoker  Smokeless tobacco: Never Used   Substance and Sexual Activity    Alcohol use: No    Drug use: No    Sexual activity: Not Currently   Lifestyle    Physical activity:     Days per week: Not on file     Minutes per session: Not on file    Stress: Not on file   Relationships    Social connections:     Talks on phone: Not on file     Gets together: Not on file     Attends [employer] service: Not on file     Active member of club or organization: Not on file     Attends meetings of clubs or organizations: Not on file     Relationship status: Not on file    Intimate partner violence:     Fear of current or ex partner: Not on file     Emotionally abused: Not on file     Physically abused: Not on file     Forced sexual activity: Not on file   Other Topics Concern    Not on file   Social History Narrative    Not on file       Current Outpatient Medications   Medication Sig Dispense Refill    gabapentin (NEURONTIN) 300 mg capsule Take 1 Cap by mouth three (3) times daily (with meals). Max Daily Amount: 900 mg. Indications: Neuropathic Pain 270 Cap 1    TURMERIC PO Take  by mouth.  predniSONE (STERAPRED DS) 10 mg dose pack See administration instruction per 10mg dose pack (Patient not taking: Reported on 8/1/2019) 21 Tab 0    meclizine (ANTIVERT) 25 mg tablet Take 1 Tab by mouth three (3) times daily as needed. (Patient taking differently: Take 25 mg by mouth three (3) times daily as needed. Indications: not taking) 30 Tab 0    ibuprofen (ADVIL) 200 mg tablet Take 400 mg by mouth every eight (8) hours as needed for Pain.  GLUC/CHND/OM3/DHA/EPA/FISH/STR (GLUCOSAMINE CHONDROITIN PLUS PO) Take  by mouth.  cyanocobalamin (VITAMIN B-12) 1,000 mcg tablet Take 1,000 mcg by mouth daily.  omega-3 fatty acids-vitamin e (FISH OIL) 1,000 mg Cap Take 1 Cap by mouth.  Cholecalciferol, Vitamin D3, (VITAMIN D) 1,000 unit Cap Take 2,000 Units by mouth.          Allergies   Allergen Reactions    Sulfa (Sulfonamide Antibiotics) Nausea Only    Tramadol Nausea and Vomiting          PHYSICAL EXAMINATION    Visit Vitals  Ht 5' 11\" (1.803 m)   Wt 168 lb (76.2 kg)   BMI 23.43 kg/m²       CONSTITUTIONAL: NAD, A&O x 3  SENSATION: Intact to light touch throughout  RANGE OF MOTION: The patient has full passive range of motion in all four extremities. MOTOR:  Straight Leg Raise: Negative, bilateral               Hip Flex Knee Ext Knee Flex Ankle DF GTE Ankle PF Tone   Right +4/5 +4/5 +4/5 +4/5 +4/5 +4/5 +4/5   Left +4/5 +4/5 +4/5 +4/5 +4/5 +4/5 +4/5       ASSESSMENT   Diagnoses and all orders for this visit:    1. DDD (degenerative disc disease), lumbar    2. Lumbar neuritis    3. Closed wedge compression fracture of fourth lumbar vertebra, initial encounter [facility])          IMPRESSION AND PLAN:  Patient wished to continue his current treatment. Patient should continue to follow with Valdemar Allen MD for refills on his Neurontin 300 mg TID. I did discuss with the pt about obtaining a bone density scan, however pt would like to follow with Valdemar Allen MD for this. Patient is neurologically intact. I will see the patient back prn. Written by Salma Redding, as dictated by Cole Patrick MD  I examined the patient, reviewed and agree with the note.

## 2019-09-19 ENCOUNTER — APPOINTMENT (OUTPATIENT)
Dept: INTERNAL MEDICINE CLINIC | Age: 84
End: 2019-09-19

## 2019-09-19 ENCOUNTER — HOSPITAL ENCOUNTER (OUTPATIENT)
Dept: LAB | Age: 84
Discharge: HOME | End: 2019-09-19
Payer: MEDICARE

## 2019-09-19 DIAGNOSIS — M81.0 AGE-RELATED OSTEOPOROSIS WITHOUT CURRENT PATHOLOGICAL FRACTURE: ICD-10-CM

## 2019-09-19 DIAGNOSIS — I45.10 RIGHT BUNDLE BRANCH BLOCK: ICD-10-CM

## 2019-09-19 LAB
ALBUMIN SERPL-MCNC: 3.4 G/DL (ref 3.4–5)
ALBUMIN/GLOB SERPL: 1.2 {RATIO} (ref 0.8–1.7)
ALP SERPL-CCNC: 66 U/L (ref 45–117)
ALT SERPL-CCNC: 22 U/L (ref 16–61)
ANION GAP SERPL CALC-SCNC: 7 MMOL/L (ref 3–18)
AST SERPL-CCNC: 18 U/L (ref 10–38)
BILIRUB SERPL-MCNC: 0.5 MG/DL (ref 0.2–1)
BUN SERPL-MCNC: 16 MG/DL (ref 7–18)
BUN/CREAT SERPL: 19 (ref 12–20)
CALCIUM SERPL-MCNC: 8.8 MG/DL (ref 8.5–10.1)
CHLORIDE SERPL-SCNC: 108 MMOL/L (ref 100–111)
CO2 SERPL-SCNC: 28 MMOL/L (ref 21–32)
CREAT SERPL-MCNC: 0.86 MG/DL (ref 0.6–1.3)
ERYTHROCYTE [DISTWIDTH] IN BLOOD BY AUTOMATED COUNT: 14.6 % (ref 11.6–14.5)
GLOBULIN SER CALC-MCNC: 2.9 G/DL (ref 2–4)
GLUCOSE SERPL-MCNC: 84 MG/DL (ref 74–99)
HCT VFR BLD AUTO: 38.6 % (ref 36–48)
HGB BLD-MCNC: 12.6 G/DL (ref 13–16)
MCH RBC QN AUTO: 30.7 PG (ref 24–34)
MCHC RBC AUTO-ENTMCNC: 32.6 G/DL (ref 31–37)
MCV RBC AUTO: 93.9 FL (ref 74–97)
PLATELET # BLD AUTO: 188 K/UL (ref 135–420)
PMV BLD AUTO: 10.4 FL (ref 9.2–11.8)
POTASSIUM SERPL-SCNC: 4.2 MMOL/L (ref 3.5–5.5)
PROT SERPL-MCNC: 6.3 G/DL (ref 6.4–8.2)
RBC # BLD AUTO: 4.11 M/UL (ref 4.7–5.5)
SODIUM SERPL-SCNC: 143 MMOL/L (ref 136–145)
WBC # BLD AUTO: 4.7 K/UL (ref 4.6–13.2)

## 2019-09-19 PROCEDURE — 80053 COMPREHEN METABOLIC PANEL: CPT

## 2019-09-19 PROCEDURE — 36415 COLL VENOUS BLD VENIPUNCTURE: CPT

## 2019-09-19 PROCEDURE — 85027 COMPLETE CBC AUTOMATED: CPT

## 2019-09-22 NOTE — PROGRESS NOTES
This is a Subsequent Medicare Annual Wellness Exam     I have reviewed the patient's medical history in detail and updated the computerized patient record. History     Past Medical History:   Diagnosis Date    B12 deficiency     Closed compression fracture of L4 lumbar vertebra     Colon polyps     Dr Joselin Zavala    Degenerative joint disease (DJD) of lumbar spine     MRI 11/14 w multilevel disease; chronic lbp w sciatica saw Dr Deborah Campa    Distal radial fracture, right wrist 02/2015    Fell on ice. s/p ORIF with right dorsal endoplate nail, two proximal screws, and four distal pegs. Dr. Aquilino Singh.  Diverticulosis     on CT 4/16    Erectile dysfunction     Nephrolithiasis 04/2016    right tiny stone on CT; microhematuria    Osteoporosis     ibandronate 2009-5/13 stopped due to tooth issues per his endodontist;  DEXA t score -2.4 spibe, -2.1 hip (8/11); -2.3 spine, -1.9 hip (8/13); -2.4 spine, -2.4 hip (9/15)    Proctalgia fugax     RBBB (right bundle branch block)     Right inguinal hernia     Dr Antwan Case      Past Surgical History:   Procedure Laterality Date    HX CATARACT REMOVAL      HX COLONOSCOPY      Dr Joselin Zavala 2012 negative    HX HERNIA REPAIR  2002    [facility] repair     Current Outpatient Medications   Medication Sig Dispense Refill    gabapentin (NEURONTIN) 300 mg capsule Take 1 Cap by mouth three (3) times daily (with meals). Max Daily Amount: 900 mg. Indications: Neuropathic Pain 270 Cap 1    ibuprofen (ADVIL) 200 mg tablet Take 400 mg by mouth every eight (8) hours as needed for Pain.  GLUC/CHND/OM3/DHA/EPA/FISH/STR (GLUCOSAMINE CHONDROITIN PLUS PO) Take  by mouth.  cyanocobalamin (VITAMIN B-12) 1,000 mcg tablet Take 1,000 mcg by mouth daily.  omega-3 fatty acids-vitamin e (FISH OIL) 1,000 mg cap Take 1 Cap by mouth.  Cholecalciferol, Vitamin D3, (VITAMIN D) 1,000 unit Cap Take 2,000 Units by mouth.  TURMERIC PO Take  by mouth.       meclizine (ANTIVERT) 25 mg tablet Take 1 Tab by mouth three (3) times daily as needed. (Patient taking differently: Take 25 mg by mouth three (3) times daily as needed. Indications: not taking) 30 Tab 0     Allergies   Allergen Reactions    Sulfa (Sulfonamide Antibiotics) Nausea Only    Tramadol Nausea and Vomiting     History reviewed. No pertinent family history. Social History     Tobacco Use    Smoking status: Never Smoker    Smokeless tobacco: Never Used   Substance Use Topics    Alcohol use: No     Depression Risk Factor Screening:     3 most recent PHQ Screens 5/7/2019   Little interest or pleasure in doing things Not at all   Feeling down, depressed, irritable, or hopeless Not at all   Total Score PHQ 2 0     SCREENINGS  Colonoscopy last done 2012 Dr Brendan Gutierrez  Prostate NINA done today    Immunization History   Administered Date(s) Administered    Influenza High Dose Vaccine PF 09/30/2014, 10/26/2015    Influenza Vaccine (Tri) Adjuvanted 11/02/2018    Influenza Vaccine PF 10/22/2013    Influenza Vaccine Split 10/23/2012    Influenza Vaccine Whole 11/12/2010    Pneumococcal Conjugate (PCV-13) 09/08/2015    Pneumococcal Vaccine (Unspecified Type) 01/01/2006    Td 01/01/2005     Alcohol Risk Factor Screening: You do not drink alcohol or very rarely. Functional Ability and Level of Safety:   Hearing Loss  Hearing is good. Activities of Daily Living  The home contains: no safety equipment  Patient does total self care    Fall Risk  Fall Risk Assessment, last 12 mths 8/1/2019   Able to walk? Yes   Fall in past 12 months?  No   Fall with injury? -   Number of falls in past 12 months -   Fall Risk Score -       Abuse Screen  Patient is not abused    Cognitive Screening   Evaluation of Cognitive Function:  Has your family/caregiver stated any concerns about your memory: no  Normal    Patient Care Team   Patient Care Team:  Ayaan Stevens MD as PCP - General (Internal Medicine)  Jerry Chavez RN as Ambulatory Care Navigator (Internal Medicine)  Andrew Nair, RN as Ambulatory Care Navigator (Internal Medicine)    Assessment/Plan   Education and counseling provided:  Are appropriate based on today's review and evaluation  End-of-Life planning (with patient's consent)  Influenza Vaccine  Cardiovascular screening blood test  Diabetes screening test   Suggested shingrix  Colonoscopy beyond age      Diagnoses and all orders for this visit:    1. Medicare annual wellness visit, subsequent    2. Encounter for immunization    3. Age-related osteoporosis without current pathological fracture  -     DEXA BONE DENSITY STUDY AXIAL; Future    4. Impotence of organic origin  -     CBC W/O DIFF; Future  -     METABOLIC PANEL, COMPREHENSIVE; Future    5. Hyperplastic colonic polyp, unspecified part of colon  -     CBC W/O DIFF; Future  -     METABOLIC PANEL, COMPREHENSIVE; Future    6. Unilateral recurrent inguinal hernia without obstruction or gangrene    7. Chronic bilateral low back pain with left-sided sciatica    8. Advanced directives, counseling/discussion  -     ADVANCE CARE PLANNING FIRST 30 MINS    9. Screening for alcoholism  -     [place] ANNUAL ALCOHOL SCREEN 15 MIN    10. Screening for depression  -     DEPRESSION SCREEN ANNUAL    11. Screening for diabetes mellitus    12.  Special screening for malignant neoplasm of prostate  -     [place] PROSTATE CA SCREENING; NINA        Health Maintenance Due   Topic Date Due    Shingrix Vaccine Age 49> (1 of 2) 01/31/1980    GLAUCOMA SCREENING Q2Y  01/31/1995    DTaP/Tdap/Td series (1 - Tdap) 01/02/2005    MEDICARE YEARLY EXAM  09/19/2019

## 2019-09-22 NOTE — PROGRESS NOTES
80 y.o. WHITE [place]  male who presents for evaluation. No cardiovascular complaints. Remains active by doing yardwork, chores, not much walking for exercise anymore. He was slowed down by the spine issues earlier in the summer but appears to be resolving with Dr Alan Serrano help    No gi or gu issues. He is interested in viagra again but will check with his wife    LAST MEDICARE WELLNESS EXAM: 10/26/15, 9/12/17, 9/18/18, 9/26/19    Past Medical History:   Diagnosis Date    B12 deficiency     Closed compression fracture of L4 lumbar vertebra     Colon polyps     Dr Leah Cuenca    Degenerative joint disease (DJD) of lumbar spine     MRI 11/14 w multilevel disease; chronic lbp w sciatica saw Dr Jonathon Olvera    Distal radial fracture, right wrist 02/2015    Fell on ice. s/p ORIF with right dorsal endoplate nail, two proximal screws, and four distal pegs. Dr. Sheng Bocanegra.     Diverticulosis     on CT 4/16    Erectile dysfunction     Nephrolithiasis 04/2016    right tiny stone on CT; microhematuria    Osteoporosis     ibandronate 2009-5/13 stopped due to tooth issues per his endodontist;  DEXA t score -2.4 spibe, -2.1 hip (8/11); -2.3 spine, -1.9 hip (8/13); -2.4 spine, -2.4 hip (9/15)    Proctalgia fugax     RBBB (right bundle branch block)     Right inguinal hernia     Dr Severo Grime     Past Surgical History:   Procedure Laterality Date    Susanne Colindres HX COLONOSCOPY      Dr Leah Cuenca 2012 negative    HX HERNIA REPAIR  2002    [facility] repair     Social History     Socioeconomic History    Marital status:      Spouse name: Not on file    Number of children: Not on file    Years of education: Not on file    Highest education level: Not on file   Occupational History    Not on file   Social Needs    Financial resource strain: Not on file    Food insecurity:     Worry: Not on file     Inability: Not on file    Transportation needs:     Medical: Not on file     Non-medical: Not on file Tobacco Use    Smoking status: Never Smoker    Smokeless tobacco: Never Used   Substance and Sexual Activity    Alcohol use: No    Drug use: No    Sexual activity: Not Currently   Lifestyle    Physical activity:     Days per week: Not on file     Minutes per session: Not on file    Stress: Not on file   Relationships    Social connections:     Talks on phone: Not on file     Gets together: Not on file     Attends Church service: Not on file     Active member of club or organization: Not on file     Attends meetings of clubs or organizations: Not on file     Relationship status: Not on file    Intimate partner violence:     Fear of current or ex partner: Not on file     Emotionally abused: Not on file     Physically abused: Not on file     Forced sexual activity: Not on file   Other Topics Concern    Not on file   Social History Narrative    Not on file     History reviewed. No pertinent family history. Current Outpatient Medications   Medication Sig    gabapentin (NEURONTIN) 300 mg capsule Take 1 Cap by mouth three (3) times daily (with meals). Max Daily Amount: 900 mg. Indications: Neuropathic Pain    ibuprofen (ADVIL) 200 mg tablet Take 400 mg by mouth every eight (8) hours as needed for Pain.  GLUC/CHND/OM3/DHA/EPA/FISH/STR (GLUCOSAMINE CHONDROITIN PLUS PO) Take  by mouth.  cyanocobalamin (VITAMIN B-12) 1,000 mcg tablet Take 1,000 mcg by mouth daily.  omega-3 fatty acids-vitamin e (FISH OIL) 1,000 mg cap Take 1 Cap by mouth.  Cholecalciferol, Vitamin D3, (VITAMIN D) 1,000 unit Cap Take 2,000 Units by mouth.  TURMERIC PO Take  by mouth.  meclizine (ANTIVERT) 25 mg tablet Take 1 Tab by mouth three (3) times daily as needed. (Patient taking differently: Take 25 mg by mouth three (3) times daily as needed. Indications: not taking)     No current facility-administered medications for this visit.       Allergies   Allergen Reactions    Sulfa (Sulfonamide Antibiotics) Nausea Only  Tramadol Nausea and Vomiting     REVIEW OF SYSTEMS: o Dr Chris Perez 2012  Ophtho  no vision change or eye pain  Oral  no mouth pain, tongue or tooth problems  Ears  no hearing loss, ear pain, fullness, no swallowing problems  Cardiac  no CP, PND, orthopnea, edema, palpitations or syncope  Chest  no breast masses  Resp  no wheezing, chronic coughing, dyspnea  GI  no heartburn, nausea, vomiting, change in bowel habits, bleeding, hemorrhoids  Urinary  no dysuria, hematuria, flank pain, urgency, frequency    Visit Vitals  /78   Pulse 64   Temp 98.8 °F (37.1 °C) (Oral)   Resp 14   Ht 5' 11\" (1.803 m)   Wt 168 lb (76.2 kg)   SpO2 95%   BMI 23.43 kg/m²      Affect is appropriate. Mood stable  No apparent distress  HEENT --Anicteric sclerae, tympanic membranes normal,  ear canals normal.  PERRL, EOMI, conjunctiva and lids normal. No thyromegaly, JVD, or bruits. Lungs --Clear to auscultation, normal percussion. Heart --Regular rate and rhythm, no murmurs, rubs, gallops, or clicks. Chest wall --Nontender to palpation. PMI normal.  Abdomen -- Soft and nontender, no hepatosplenomegaly or masses. Prostate  -- no asymmetry, nodularity, tenderness or enlargement  Rectal  -- normal tone, guaiac negative brown stool  Extremities -- Without cyanosis, clubbing, edema. 2+ pulses equally and bilaterally.     LABS  From 9/14 showed gluc 90,   cr 1.43, gfr 47,  alt<5,  chol 149, tg 56, hdl 59, ldl-c 79, wbc 5.7, hb 13.2, plt 170, ua tr bl, vit d 46.4  From 2/15 showed gluc 114, cr 1.06, gfr>60, alt 17,        wbc 6.9, hb 12.9, plt 187  From 9/15 showed gluc 89,   cr 0.93, gfr 75,  alt 13, chol 165, tg 92, hdl 66, ldl-c 81, wbc 5.8, hb 14.0, plt 198, ua tr bl, vit d 47.3,          b12 486  From 8/16 showed gluc 148, cr 1.16, gfr 60,  alt 20,                       ck/trop neg  From 8/16 showed gluc 80,   cr 0.97, gfr>60, alt 28, chol 118, tg 71, hdl 46, ldl-c 58, wbc 5.7, hb 12.5, plt 341, ua tr bl, vit d 40.9, tsh 1.04  From 9/17 showed gluc 89,   cr 1.01, gfr>60, alt 20, chol 152, tg 76, hdl 74, ldl-c 63, wbc 5.5, hb 13.7, plt 195, ua neg, vit d 52.0, tsh 1.32  From 9/18 showed gluc 83,   cr 0.87, gfr>60, alt 20,        wbc 5.1, hb 13.2, plt 192,      tsh 1.40, ft4 0.90, b12 537, fol>20    Results for orders placed or performed during the hospital encounter of 09/19/19   CBC W/O DIFF   Result Value Ref Range    WBC 4.7 4.6 - 13.2 K/uL    RBC 4.11 (L) 4.70 - 5.50 M/uL    HGB 12.6 (L) 13.0 - 16.0 g/dL    HCT 38.6 36.0 - 48.0 %    MCV 93.9 74.0 - 97.0 FL    MCH 30.7 24.0 - 34.0 PG    MCHC 32.6 31.0 - 37.0 g/dL    RDW 14.6 (H) 11.6 - 14.5 %    PLATELET 216 980 - 965 K/uL    MPV 10.4 9.2 - 02.8 FL   METABOLIC PANEL, COMPREHENSIVE   Result Value Ref Range    Sodium 143 136 - 145 mmol/L    Potassium 4.2 3.5 - 5.5 mmol/L    Chloride 108 100 - 111 mmol/L    CO2 28 21 - 32 mmol/L    Anion gap 7 3.0 - 18 mmol/L    Glucose 84 74 - 99 mg/dL    BUN 16 7.0 - 18 MG/DL    Creatinine 0.86 0.6 - 1.3 MG/DL    BUN/Creatinine ratio 19 12 - 20      GFR est AA >60 >60 ml/min/1.73m2    GFR est non-AA >60 >60 ml/min/1.73m2    Calcium 8.8 8.5 - 10.1 MG/DL    Bilirubin, total 0.5 0.2 - 1.0 MG/DL    ALT (SGPT) 22 16 - 61 U/L    AST (SGOT) 18 10 - 38 U/L    Alk. phosphatase 66 45 - 117 U/L    Protein, total 6.3 (L) 6.4 - 8.2 g/dL    Albumin 3.4 3.4 - 5.0 g/dL    Globulin 2.9 2.0 - 4.0 g/dL    A-G Ratio 1.2 0.8 - 1.7       Patient Active Problem List   Diagnosis Code    Impotence of organic origin N52.9    Right bundle branch block I45.10    Colon polyp K63.5    Osteoporosis s/p compression fx L4, fx wrist  M81.0    Nephrolithiasis N20.0    Inguinal hernia, right Dr Gr Failing K40.90    Microhematuria R31.29    Low back pain with left-sided sciatica M54.42    Advance directive in chart Z78.9     Assessment and plan:  1. Colon polyp. Fiber  2. Osteoporosis. Ca/d, weight bearing exercise.   With thew recent spine compression, he is interested in at least getting DEXA f/u; will decide on whether to treat later  3. Nephrolithiasis. Hydration  4. RIH. Observation, saw Dr Aravind Neal previously  5. Spine. Per Dr Renzo Liu  6. ED. Will talke to wife about pde5i      RTC 9/20    Above conditions discussed at length and patient vocalized understanding.   All questions answered to patient satisfaction

## 2019-09-26 ENCOUNTER — OFFICE VISIT (OUTPATIENT)
Dept: INTERNAL MEDICINE CLINIC | Age: 84
End: 2019-09-26

## 2019-09-26 VITALS
WEIGHT: 168 LBS | HEIGHT: 71 IN | SYSTOLIC BLOOD PRESSURE: 106 MMHG | HEART RATE: 64 BPM | DIASTOLIC BLOOD PRESSURE: 78 MMHG | TEMPERATURE: 98.8 F | OXYGEN SATURATION: 95 % | BODY MASS INDEX: 23.52 KG/M2 | RESPIRATION RATE: 14 BRPM

## 2019-09-26 DIAGNOSIS — Z00.00 MEDICARE ANNUAL WELLNESS VISIT, SUBSEQUENT: Primary | ICD-10-CM

## 2019-09-26 DIAGNOSIS — K40.91 UNILATERAL RECURRENT INGUINAL HERNIA WITHOUT OBSTRUCTION OR GANGRENE: ICD-10-CM

## 2019-09-26 DIAGNOSIS — Z13.39 SCREENING FOR ALCOHOLISM: ICD-10-CM

## 2019-09-26 DIAGNOSIS — Z12.5 SPECIAL SCREENING FOR MALIGNANT NEOPLASM OF PROSTATE: ICD-10-CM

## 2019-09-26 DIAGNOSIS — K63.5 HYPERPLASTIC COLONIC POLYP, UNSPECIFIED PART OF COLON: ICD-10-CM

## 2019-09-26 DIAGNOSIS — Z13.1 SCREENING FOR DIABETES MELLITUS: ICD-10-CM

## 2019-09-26 DIAGNOSIS — Z71.89 ADVANCED DIRECTIVES, COUNSELING/DISCUSSION: ICD-10-CM

## 2019-09-26 DIAGNOSIS — M54.42 CHRONIC BILATERAL LOW BACK PAIN WITH LEFT-SIDED SCIATICA: ICD-10-CM

## 2019-09-26 DIAGNOSIS — Z23 ENCOUNTER FOR IMMUNIZATION: ICD-10-CM

## 2019-09-26 DIAGNOSIS — Z13.31 SCREENING FOR DEPRESSION: ICD-10-CM

## 2019-09-26 DIAGNOSIS — G89.29 CHRONIC BILATERAL LOW BACK PAIN WITH LEFT-SIDED SCIATICA: ICD-10-CM

## 2019-09-26 DIAGNOSIS — N52.9 IMPOTENCE OF ORGANIC ORIGIN: ICD-10-CM

## 2019-09-26 DIAGNOSIS — M81.0 AGE-RELATED OSTEOPOROSIS WITHOUT CURRENT PATHOLOGICAL FRACTURE: ICD-10-CM

## 2019-09-26 NOTE — ACP (ADVANCE CARE PLANNING)
Advance Care Planning    Advance Care Planning (ACP) Provider Note - Comprehensive     Date of ACP Conversation: 09/26/19  Persons included in Conversation:  patient  Length of ACP Conversation in minutes:  16 minutes    Authorized Decision Maker (if patient is incapable of making informed decisions): This person is: wife  Healthcare Agent/Medical Power of  under Advance Directive          General ACP for ALL Patients with Decision Making Capacity:   Importance of advance care planning, including choosing a healthcare agent to communicate patient's healthcare decisions if patient lost the ability to make decisions, such as after a sudden illness or accident  Understanding of the healthcare agent role was assessed and information provided    Review of Existing Advance Directive:  Does this advance directive still reflect your preferences?   Yes (Provide new form/Refer for assistance in updating)    For Serious or Chronic Illness:  Understanding of medical condition      Interventions Provided:  Recommended communicating the plan and making copies for the healthcare agent, personal physician, and others as appropriate (e.g., health system)  Recommended review of completed ACP document annually or upon change in health status

## 2019-09-26 NOTE — PATIENT INSTRUCTIONS
Vaccine Information Statement Influenza (Flu) Vaccine (Inactivated or Recombinant): What You Need to Know Many Vaccine Information Statements are available in [place] and other languages. See www.immunize.org/vis Hojas de información sobre vacunas están disponibles en español y en muchos otros idiomas. Visite www.immunize.org/vis 1. Why get vaccinated? Influenza vaccine can prevent influenza (flu). Flu is a contagious disease that spreads around the [facility] every year, usually between October and May. Anyone can get the flu, but it is more dangerous for some people. Infants and young children, people 72years of age and older, pregnant women, and people with certain health conditions or a weakened immune system are at greatest risk of flu complications. Pneumonia, bronchitis, sinus infections and ear infections are examples of flu-related complications. If you have a medical condition, such as heart disease, cancer or diabetes, flu can make it worse. Flu can cause fever and chills, sore throat, muscle aches, fatigue, cough, headache, and runny or stuffy nose. Some people may have vomiting and diarrhea, though this is more common in children than adults. Each year thousands of people in the [facility] die from flu, and many more are hospitalized. Flu vaccine prevents millions of illnesses and flu-related visits to the doctor each year. 2. Influenza vaccines CDC recommends everyone 10months of age and older get vaccinated every flu season. Children 6 months through 6years of age may need 2 doses during a single flu season. Everyone else needs only 1 dose each flu season. It takes about 2 weeks for protection to develop after vaccination. There are many flu viruses, and they are always changing. Each year a new flu vaccine is made to protect against three or four viruses that are likely to cause disease in the upcoming flu season.  Even when the vaccine doesnt exactly match these viruses, it may still provide some protection. Influenza vaccine does not cause flu. Influenza vaccine may be given at the same time as other vaccines. 3. Talk with your health care provider Tell your vaccine provider if the person getting the vaccine: 
 Has had an allergic reaction after a previous dose of influenza vaccine, or has any severe, life-threatening allergies.  Has ever had Guillain-Barré Syndrome (also called GBS). In some cases, your health care provider may decide to postpone influenza vaccination to a future visit. People with minor illnesses, such as a cold, may be vaccinated. People who are moderately or severely ill should usually wait until they recover before getting influenza vaccine. Your health care provider can give you more information. 4. Risks of a reaction  Soreness, redness, and swelling where shot is given, fever, muscle aches, and headache can happen after influenza vaccine.  There may be a very small increased risk of Guillain-Barré Syndrome (GBS) after inactivated influenza vaccine (the flu shot). Brenton Schaumann children who get the flu shot along with pneumococcal vaccine (PCV13), and/or DTaP vaccine at the same time might be slightly more likely to have a seizure caused by fever. Tell your health care provider if a child who is getting flu vaccine has ever had a seizure. People sometimes faint after medical procedures, including vaccination. Tell your provider if you feel dizzy or have vision changes or ringing in the ears. As with any medicine, there is a very remote chance of a vaccine causing a severe allergic reaction, other serious injury, or death. 5. What if there is a serious problem? An allergic reaction could occur after the vaccinated person leaves the clinic.  If you see signs of a severe allergic reaction (hives, swelling of the face and throat, difficulty breathing, a fast heartbeat, dizziness, or weakness), call 9-1-1 and get the person to the nearest hospital. 
 
For other signs that concern you, call your health care provider. Adverse reactions should be reported to the Vaccine Adverse Event Reporting System (VAERS). Your health care provider will usually file this report, or you can do it yourself. Visit the VAERS website at www.vaers. hhs.gov or call 8-654.889.5307. VAERS is only for reporting reactions, and VAERS staff do not give medical advice. 6. The National Vaccine Injury Compensation Program 
 
The [facility] Vaccine Injury Compensation Program (VICP) is a federal program that was created to compensate people who may have been injured by certain vaccines. Visit the VICP website at www.[facility].gov/vaccinecompensation or call 5-920.572.6463 to learn about the program and about filing a claim. There is a time limit to file a claim for compensation. 7. How can I learn more?  Ask your health care provider.  Call your local or state health department.  Contact the Centers for Disease Control and Prevention (CDC): 
- Call 7-166.834.8864 (2-057-RPX-INFO) or 
- Visit CDCs influenza website at www.cdc.gov/flu Vaccine Information Statement (Interim) Inactivated Influenza Vaccine 8/15/2019 
[address] Nyla Fails 101GF-06 Department of [employer] Centers for Disease Control and Prevention Office Use Only Medicare Wellness Visit, Male The best way to live healthy is to have a lifestyle where you eat a well-balanced diet, exercise regularly, limit alcohol use, and quit all forms of tobacco/nicotine, if applicable. Regular preventive services are another way to keep healthy. Preventive services (vaccines, screening tests, monitoring & exams) can help personalize your care plan, which helps you manage your own care. Screening tests can find health problems at the earliest stages, when they are easiest to treat. [address] [facility] follows the current, evidence-based guidelines published by the [facility] States Bryn Valles ([facility]) when recommending preventive services for our patients. Because we follow these guidelines, sometimes recommendations change over time as research supports it. (For example, a prostate screening blood test is no longer routinely recommended for men with no symptoms.) Of course, you and your doctor may decide to screen more often for some diseases, based on your risk and co-morbidities (chronic disease you are already diagnosed with). Preventive services for you include: - Medicare offers their members a free annual wellness visit, which is time for you and your primary care provider to discuss and plan for your preventive service needs. Take advantage of this benefit every year! 
-All adults over age 72 should receive the recommended pneumonia vaccines. Current USPSTF guidelines recommend a series of two vaccines for the best pneumonia protection.  
-All adults should have a flu vaccine yearly and an ECG. All adults age 61 and older should receive a shingles vaccine once in their lifetime.   
-All adults age 38-68 who are overweight should have a diabetes screening test once every three years.  
-Other screening tests & preventive services for persons with diabetes include: an eye exam to screen for diabetic retinopathy, a kidney function test, a foot exam, and stricter control over your cholesterol.  
-Cardiovascular screening for adults with routine risk involves an electrocardiogram (ECG) at intervals determined by the provider.  
-Colorectal cancer screening should be done for adults age 54-65 with no increased risk factors for colorectal cancer. There are a number of acceptable methods of screening for this type of cancer. Each test has its own benefits and drawbacks.  Discuss with your provider what is most appropriate for you during your annual wellness visit. The different tests include: colonoscopy (considered the best screening method), a fecal occult blood test, a fecal DNA test, and sigmoidoscopy. 
-All adults born between [facility] should be screened once for Hepatitis C. 
-An Abdominal Aortic Aneurysm (AAA) Screening is recommended for men age 73-68 who has ever smoked in their lifetime. Here is a list of your current Health Maintenance items (your personalized list of preventive services) with a due date: 
Health Maintenance Due Topic Date Due  Shingles Vaccine (1 of 2) 01/31/1980  Glaucoma Screening   01/31/1995  
 DTaP/Tdap/Td  (1 - Tdap) 01/02/2005 [facility] Annual Well Visit  09/19/2019

## 2019-09-26 NOTE — PROGRESS NOTES
Lamar Burger presents today for   Chief Complaint   Patient presents with   [employer] Wellness Visit    Other     physical with labs              Depression Screening:  3 most recent PHQ Screens 5/7/2019   Little interest or pleasure in doing things Not at all   Feeling down, depressed, irritable, or hopeless Not at all   Total Score PHQ 2 0       Learning Assessment:  Learning Assessment 5/7/2019   PRIMARY LEARNER Patient   HIGHEST LEVEL OF EDUCATION - PRIMARY LEARNER  [address] PRIMARY LEARNER HEARING   CO-LEARNER CAREGIVER No   PRIMARY LANGUAGE ENGLISH   LEARNER PREFERENCE PRIMARY VIDEOS     DEMONSTRATION     LISTENING     PICTURES     READING   ANSWERED BY patient   RELATIONSHIP SELF       Abuse Screening:  Abuse Screening Questionnaire 5/7/2019   Do you ever feel afraid of your partner? N   Are you in a relationship with someone who physically or mentally threatens you? N   Is it safe for you to go home? Y       Fall Risk  Fall Risk Assessment, last 12 mths 8/1/2019   Able to walk? Yes   Fall in past 12 months? No   Fall with injury? -   Number of falls in past 12 months -   Fall Risk Score -           Coordination of Care:  1. Have you been to the ER, urgent care clinic since your last visit? Hospitalized since your last visit? no    2. Have you seen or consulted any other health care providers outside of the [address] [place] [address] since your last visit? Include any pap smears or colon screening.  no

## 2020-03-30 DIAGNOSIS — S32.040A CLOSED WEDGE COMPRESSION FRACTURE OF FOURTH LUMBAR VERTEBRA, INITIAL ENCOUNTER: ICD-10-CM

## 2020-03-30 DIAGNOSIS — M54.16 LUMBAR NEURITIS: ICD-10-CM

## 2020-03-30 DIAGNOSIS — M51.36 DDD (DEGENERATIVE DISC DISEASE), LUMBAR: ICD-10-CM

## 2020-03-30 NOTE — TELEPHONE ENCOUNTER
Patient got on the phone while I was on the phone with his wife and verified his full name and date of birth. Patient states he ordered Gabapentin from [facility] and wanted to know if he can get a refill. Medication pending to be signed.

## 2020-03-31 RX ORDER — GABAPENTIN 300 MG/1
300 CAPSULE ORAL
Qty: 270 CAP | Refills: 1 | Status: SHIPPED | OUTPATIENT
Start: 2020-03-31 | End: 2020-12-16

## 2020-07-02 ENCOUNTER — VIRTUAL VISIT (OUTPATIENT)
Dept: INTERNAL MEDICINE CLINIC | Age: 85
End: 2020-07-02

## 2020-07-02 DIAGNOSIS — M54.42 CHRONIC BILATERAL LOW BACK PAIN WITH LEFT-SIDED SCIATICA: Primary | ICD-10-CM

## 2020-07-02 DIAGNOSIS — R42 DIZZINESS: ICD-10-CM

## 2020-07-02 DIAGNOSIS — G89.29 CHRONIC BILATERAL LOW BACK PAIN WITH LEFT-SIDED SCIATICA: Primary | ICD-10-CM

## 2020-07-02 RX ORDER — MECLIZINE HYDROCHLORIDE 25 MG/1
25 TABLET ORAL
Qty: 30 TAB | Refills: 1 | Status: SHIPPED | OUTPATIENT
Start: 2020-07-02 | End: 2021-10-04

## 2020-07-02 NOTE — PROGRESS NOTES
Mary Moses is a 80 y.o. male, evaluated via audio-only technology on 7/2/2020 for No chief complaint on file. .    Assessment & Plan:   Diagnoses and all orders for this visit:    1. Chronic bilateral low back pain with left-sided sciatica    2. Dizziness  -     meclizine (ANTIVERT) 25 mg tablet; Take 1 Tab by mouth three (3) times daily as needed for Dizziness. 12  Subjective:       No flowsheet data found. Mary Moses, who was evaluated through a patient-initiated, synchronous (real-time) audio only encounter, and/or her healthcare decision maker, is aware that it is a billable service, with coverage as determined by his insurance carrier. He provided verbal consent to proceed: Yes. He has not had a related appointment within my department in the past 7 days or scheduled within the next 24 hours. Total Time: minutes: 21-30 minutes    Perico Andrew MD     He hurt his back in April and has been self treating w low dose advil. Remains on the gabapentin and he has not seen Dr Darylene Roys in the interim. Reports no radicular complaints. He denies any GI or  complaints. He does not really want to go see anybody at this point and will just see how it goes    A week or 2 ago, he noted onset of dizziness and is asking for meclizine refill. He has had a few other episodes in the past.  They right ear fullness, he has longstanding tinnitus and hearing loss. No cardiovascular complaints. No set exercise but he tries to be active doing yard work, chores.     LAST MEDICARE WELLNESS EXAM: 10/26/15, 9/12/17, 9/18/18, 9/26/19    Past Medical History:   Diagnosis Date    B12 deficiency     Closed compression fracture of L4 lumbar vertebra     Colon polyps     Dr Debra Franco    Degenerative joint disease (DJD) of lumbar spine     MRI 11/14 w multilevel disease; chronic lbp w sciatica saw Dr Anderson Morrow    Distal radial fracture, right wrist 02/2015    Fell on ice. s/p ORIF with right dorsal endoplate nail, two proximal screws, and four distal pegs. Dr. Narcisa Cheung.     Diverticulosis     on CT 4/16    Erectile dysfunction     Nephrolithiasis 04/2016    right tiny stone on CT; microhematuria    Osteoporosis     ibandronate 2009-5/13 stopped due to tooth issues per his endodontist;  DEXA t score -2.4 spibe, -2.1 hip (8/11); -2.3 spine, -1.9 hip (8/13); -2.4 spine, -2.4 hip (9/15)    Proctalgia fugax     RBBB (right bundle branch block)     Right inguinal hernia     Dr Werner Messina     Past Surgical History:   Procedure Laterality Date    Elina Story HX COLONOSCOPY      Dr Jorge José 2012 negative    [facility]  [facility]     Social History     Socioeconomic History    Marital status:      Spouse name: Not on file    Number of children: Not on file    Years of education: Not on file    Highest education level: Not on file   Occupational History    Not on file   Social Needs    Financial resource strain: Not on file    Food insecurity     Worry: Not on file     Inability: Not on file    Transportation needs     Medical: Not on file     Non-medical: Not on file   Tobacco Use    Smoking status: Never Smoker    Smokeless tobacco: Never Used   Substance and Sexual Activity    Alcohol use: No    Drug use: No    Sexual activity: Not Currently   Lifestyle    Physical activity     Days per week: Not on file     Minutes per session: Not on file    Stress: Not on file   Relationships    Social connections     Talks on phone: Not on file     Gets together: Not on file     Attends Rastafarian service: Not on file     Active member of club or organization: Not on file     Attends meetings of clubs or organizations: Not on file     Relationship status: Not on file    Intimate partner violence     Fear of current or ex partner: Not on file     Emotionally abused: Not on file     Physically abused: Not on file     Forced sexual activity: Not on file   Other Topics Concern  Not on file   Social History Narrative    Not on file     No family history on file. Current Outpatient Medications   Medication Sig    meclizine (ANTIVERT) 25 mg tablet Take 1 Tab by mouth three (3) times daily as needed for Dizziness.  gabapentin (NEURONTIN) 300 mg capsule Take 1 Cap by mouth three (3) times daily (with meals). Max Daily Amount: 900 mg. Indications: neuropathic pain    TURMERIC PO Take  by mouth.  ibuprofen (ADVIL) 200 mg tablet Take 400 mg by mouth every eight (8) hours as needed for Pain.  GLUC/CHND/OM3/DHA/EPA/FISH/STR (GLUCOSAMINE CHONDROITIN PLUS PO) Take  by mouth.  cyanocobalamin (VITAMIN B-12) 1,000 mcg tablet Take 1,000 mcg by mouth daily.  omega-3 fatty acids-vitamin e (FISH OIL) 1,000 mg cap Take 1 Cap by mouth.  Cholecalciferol, Vitamin D3, (VITAMIN D) 1,000 unit Cap Take 2,000 Units by mouth. No current facility-administered medications for this visit.       Allergies   Allergen Reactions    Sulfa (Sulfonamide Antibiotics) Nausea Only    Tramadol Nausea and Vomiting     REVIEW OF SYSTEMS: colo Dr Raquel Calvert 2012  Ophtho  no vision change or eye pain  Oral  no mouth pain, tongue or tooth problems  Ears  no hearing loss, ear pain, fullness, no swallowing problems  Cardiac  no CP, PND, orthopnea, edema, palpitations or syncope  Chest  no breast masses  Resp  no wheezing, chronic coughing, dyspnea  GI  no heartburn, nausea, vomiting, change in bowel habits, bleeding, hemorrhoids  Urinary  no dysuria, hematuria, flank pain, urgency, frequency    LABS  From 9/14 showed gluc 90,   cr 1.43, gfr 47,  alt<5,  chol 149, tg 56, hdl 59, ldl-c 79, wbc 5.7, hb 13.2, plt 170, ua tr bl, vit d 46.4  From 2/15 showed gluc 114, cr 1.06, gfr>60, alt 17,        wbc 6.9, hb 12.9, plt 187  From 9/15 showed gluc 89,   cr 0.93, gfr 75,  alt 13, chol 165, tg 92, hdl 66, ldl-c 81, wbc 5.8, hb 14.0, plt 198, ua tr bl, vit d 47.3,          b12 486  From 8/16 showed gluc 148, cr 1.16, gfr 60,  alt 20,                       ck/trop neg  From 8/16 showed gluc 80,   cr 0.97, gfr>60, alt 28, chol 118, tg 71, hdl 46, ldl-c 58, wbc 5.7, hb 12.5, plt 341, ua tr bl, vit d 40.9, tsh 1.04  From 9/17 showed gluc 89,   cr 1.01, gfr>60, alt 20, chol 152, tg 76, hdl 74, ldl-c 63, wbc 5.5, hb 13.7, plt 195, ua neg, vit d 52.0, tsh 1.32  From 9/18 showed gluc 83,   cr 0.87, gfr>60, alt 20,        wbc 5.1, hb 13.2, plt 192,      tsh 1.40, ft4 0.90, b12 537, fol>20  From 9/19 showed gluc 84,   cr 0.86, gfr>60, alt 22,        wbc 4.7, hb 12.6, plt 188    Patient Active Problem List   Diagnosis Code    Impotence of organic origin N52.9    Right bundle branch block I45.10    Colon polyp K63.5    Osteoporosis s/p compression fx L4, fx wrist  M81.0    Nephrolithiasis N20.0    Inguinal hernia, right Dr Zabala Drafts K40.90    Low back pain with left-sided sciatica M54.42    Advance directive in chart Z78.9     Assessment and plan:  1. Colon polyp. Fiber  2. Osteoporosis. Ca/d, weight bearing exercise. He never did get the Dexa done  3. Nephrolithiasis. Hydration  4. Dizziness. Meclizine sent  5. Spine. Per Dr Sidney Vanegas; he wants to continue current regimen for the back pain        RTC 9/20    Above conditions discussed at length and patient vocalized understanding.   All questions answered to patient satisfaction

## 2020-09-21 ENCOUNTER — APPOINTMENT (OUTPATIENT)
Dept: INTERNAL MEDICINE CLINIC | Age: 85
End: 2020-09-21

## 2020-09-21 ENCOUNTER — HOSPITAL ENCOUNTER (OUTPATIENT)
Dept: LAB | Age: 85
Discharge: HOME | End: 2020-09-21
Payer: MEDICARE

## 2020-09-21 DIAGNOSIS — K63.5 HYPERPLASTIC COLONIC POLYP, UNSPECIFIED PART OF COLON: ICD-10-CM

## 2020-09-21 DIAGNOSIS — N52.9 IMPOTENCE OF ORGANIC ORIGIN: ICD-10-CM

## 2020-09-21 LAB
ALBUMIN SERPL-MCNC: 3.5 G/DL (ref 3.4–5)
ALBUMIN/GLOB SERPL: 1.2 {RATIO} (ref 0.8–1.7)
ALP SERPL-CCNC: 82 U/L (ref 45–117)
ALT SERPL-CCNC: 19 U/L (ref 16–61)
ANION GAP SERPL CALC-SCNC: 3 MMOL/L (ref 3–18)
AST SERPL-CCNC: 18 U/L (ref 10–38)
BILIRUB SERPL-MCNC: 1.1 MG/DL (ref 0.2–1)
BUN SERPL-MCNC: 16 MG/DL (ref 7–18)
BUN/CREAT SERPL: 17 (ref 12–20)
CALCIUM SERPL-MCNC: 8.9 MG/DL (ref 8.5–10.1)
CHLORIDE SERPL-SCNC: 108 MMOL/L (ref 100–111)
CO2 SERPL-SCNC: 31 MMOL/L (ref 21–32)
CREAT SERPL-MCNC: 0.96 MG/DL (ref 0.6–1.3)
ERYTHROCYTE [DISTWIDTH] IN BLOOD BY AUTOMATED COUNT: 14.3 % (ref 11.6–14.5)
GLOBULIN SER CALC-MCNC: 2.9 G/DL (ref 2–4)
GLUCOSE SERPL-MCNC: 88 MG/DL (ref 74–99)
HCT VFR BLD AUTO: 39.3 % (ref 36–48)
HGB BLD-MCNC: 12.8 G/DL (ref 13–16)
MCH RBC QN AUTO: 30.3 PG (ref 24–34)
MCHC RBC AUTO-ENTMCNC: 32.6 G/DL (ref 31–37)
MCV RBC AUTO: 93.1 FL (ref 74–97)
PLATELET # BLD AUTO: 195 K/UL (ref 135–420)
PMV BLD AUTO: 10.7 FL (ref 9.2–11.8)
POTASSIUM SERPL-SCNC: 4.3 MMOL/L (ref 3.5–5.5)
PROT SERPL-MCNC: 6.4 G/DL (ref 6.4–8.2)
RBC # BLD AUTO: 4.22 M/UL (ref 4.7–5.5)
SODIUM SERPL-SCNC: 142 MMOL/L (ref 136–145)
WBC # BLD AUTO: 5.2 K/UL (ref 4.6–13.2)

## 2020-09-21 PROCEDURE — 85027 COMPLETE CBC AUTOMATED: CPT

## 2020-09-21 PROCEDURE — 36415 COLL VENOUS BLD VENIPUNCTURE: CPT

## 2020-09-21 PROCEDURE — 80053 COMPREHEN METABOLIC PANEL: CPT

## 2020-09-27 NOTE — PROGRESS NOTES
This is a Subsequent Medicare Annual Wellness Exam     I have reviewed the patient's medical history in detail and updated the computerized patient record. History     Past Medical History:   Diagnosis Date    B12 deficiency     Closed compression fracture of L4 lumbar vertebra     Colon polyps     Dr Betty Adhikari    Degenerative joint disease (DJD) of lumbar spine     MRI 11/14 w multilevel disease; chronic lbp w sciatica saw Dr Sarah You    Distal radial fracture, right wrist 02/2015    Fell on ice. s/p ORIF with right dorsal endoplate nail, two proximal screws, and four distal pegs. Dr. Shraddha Ramos.  Diverticulosis     on CT 4/16    Erectile dysfunction     Nephrolithiasis 04/2016    right tiny stone on CT; microhematuria    Osteoporosis     ibandronate 2009-5/13 stopped due to tooth issues per his endodontist;  DEXA t score -2.4 spibe, -2.1 hip (8/11); -2.3 spine, -1.9 hip (8/13); -2.4 spine, -2.4 hip (9/15)    Proctalgia fugax     RBBB (right bundle branch block)     Right inguinal hernia     Dr Cesilia Weaver Tinnitus       Past Surgical History:   Procedure Laterality Date    HX CATARACT REMOVAL      HX COLONOSCOPY      Dr Betty Adhikari 2012 negative    HX HERNIA REPAIR  2002    [facility] repair     Current Outpatient Medications   Medication Sig Dispense Refill    meclizine (ANTIVERT) 25 mg tablet Take 1 Tab by mouth three (3) times daily as needed for Dizziness. 30 Tab 1    gabapentin (NEURONTIN) 300 mg capsule Take 1 Cap by mouth three (3) times daily (with meals). Max Daily Amount: 900 mg. Indications: neuropathic pain 270 Cap 1    ibuprofen (ADVIL) 200 mg tablet Take 400 mg by mouth every eight (8) hours as needed for Pain.  GLUC/CHND/OM3/DHA/EPA/FISH/STR (GLUCOSAMINE CHONDROITIN PLUS PO) Take  by mouth.  cyanocobalamin (VITAMIN B-12) 1,000 mcg tablet Take 1,000 mcg by mouth daily.  Cholecalciferol, Vitamin D3, (VITAMIN D) 1,000 unit Cap Take 2,000 Units by mouth.       TURMERIC PO Take by mouth.  omega-3 fatty acids-vitamin e (FISH OIL) 1,000 mg cap Take 1 Cap by mouth. Allergies   Allergen Reactions    Sulfa (Sulfonamide Antibiotics) Nausea Only    Tramadol Nausea and Vomiting     No family history on file. Social History     Tobacco Use    Smoking status: Never Smoker    Smokeless tobacco: Never Used   Substance Use Topics    Alcohol use: No     Depression Risk Factor Screening:     3 most recent PHQ Screens 9/28/2020   Little interest or pleasure in doing things Not at all   Feeling down, depressed, irritable, or hopeless Not at all   Total Score PHQ 2 0     SCREENINGS  Colonoscopy last done 2012 Dr Sangeetha Bran  Prostate NINA done today    Immunization History   Administered Date(s) Administered    Influenza High Dose Vaccine PF 09/30/2014, 10/26/2015    Influenza Vaccine (Tri) Adjuvanted 11/02/2018, 10/17/2019    Influenza Vaccine PF 10/22/2013    Influenza Vaccine Split 10/23/2012    Influenza Vaccine Whole 11/12/2010    Pneumococcal Conjugate (PCV-13) 09/08/2015    Pneumococcal Vaccine (Unspecified Type) 01/01/2006    Td 01/01/2005     Alcohol Risk Factor Screening: You do not drink alcohol or very rarely. Functional Ability and Level of Safety:   Hearing Loss  Hearing is good. Activities of Daily Living  The home contains: no safety equipment  Patient does total self care    Fall Risk  Fall Risk Assessment, last 12 mths 9/28/2020   Able to walk? Yes   Fall in past 12 months?  No   Fall with injury? -   Number of falls in past 12 months -   Fall Risk Score -       Abuse Screen  Patient is not abused    Cognitive Screening   Evaluation of Cognitive Function:  Has your family/caregiver stated any concerns about your memory: no  Normal    Patient Care Team   Patient Care Team:  Tom Mccullough MD as PCP - General (Internal Medicine)  Tom Mccullough MD as PCP - [address] [place] [address] [facility]  Alec Tapia RN as Ambulatory Care Manager (Internal Medicine)    Assessment/Plan   Education and counseling provided:  Are appropriate based on today's review and evaluation  End-of-Life planning (with patient's consent)  Influenza Vaccine  Cardiovascular screening blood test  Diabetes screening test   Suggested shingrix  Colonoscopy beyond age      Diagnoses and all orders for this visit:    1. Medicare annual wellness visit, subsequent    2. Anemia, unspecified type  -     IRON PROFILE; Future  -     FERRITIN; Future  -     VITAMIN B12 & FOLATE; Future  -     METHYLMALONIC ACID; Future  -     PROTEIN ELECTROPHORESIS; Future  -     RETICULOCYTE COUNT; Future    3. Hyperplastic colonic polyp, unspecified part of colon    4. Age-related osteoporosis without current pathological fracture    5. Nephrolithiasis    6. Advanced directives, counseling/discussion  -     ADVANCE CARE PLANNING FIRST 30 MINS    7. Screening for depression  -     DEPRESSION SCREEN ANNUAL    8.  Screening for diabetes mellitus        Health Maintenance Due   Topic Date Due    DTaP/Tdap/Td series (1 - Tdap) 01/31/1951    Shingrix Vaccine Age 50> (1 of 2) 01/31/1980    GLAUCOMA SCREENING Q2Y  01/31/1995    Flu Vaccine (1) 09/01/2020    Medicare Yearly Exam  09/26/2020

## 2020-09-27 NOTE — PROGRESS NOTES
80 y.o. WHITE OR  male who presents for evaluation. No cardiovascular complaints. Remains active by doing yardwork, chores, not much walking for exercise anymore. No gi or gu issues. Specifically, no bleeding to report\    He never did get the f/u DEXA done    LAST MEDICARE WELLNESS EXAM: 10/26/15, 9/12/17, 9/18/18, 9/26/19, 9/24/20    Past Medical History:   Diagnosis Date    B12 deficiency     Closed compression fracture of L4 lumbar vertebra     Colon polyps     Dr Liz Anna    Degenerative joint disease (DJD) of lumbar spine     MRI 11/14 w multilevel disease; chronic lbp w sciatica saw Dr Trent Wick    Distal radial fracture, right wrist 02/2015    Fell on ice. s/p ORIF with right dorsal endoplate nail, two proximal screws, and four distal pegs. Dr. Claudette Ali.     Diverticulosis     on CT 4/16    Erectile dysfunction     Nephrolithiasis 04/2016    right tiny stone on CT; microhematuria    Osteoporosis     ibandronate 2009-5/13 stopped due to tooth issues per his endodontist;  DEXA t score -2.4 spibe, -2.1 hip (8/11); -2.3 spine, -1.9 hip (8/13); -2.4 spine, -2.4 hip (9/15)    Proctalgia fugax     RBBB (right bundle branch block)     Right inguinal hernia     Dr Smith [place] Tinnitus      Past Surgical History:   Procedure Laterality Date    Gabriella Sanchez HX COLONOSCOPY      Dr Liz Anna 2012 negative    HX HERNIA REPAIR  2002    [facility]     Social History     Socioeconomic History    Marital status:      Spouse name: Not on file    Number of children: Not on file    Years of education: Not on file    Highest education level: Not on file   Occupational History    Not on file   Social Needs    Financial resource strain: Not on file    Food insecurity     Worry: Not on file     Inability: Not on file    Transportation needs     Medical: Not on file     Non-medical: Not on file   Tobacco Use    Smoking status: Never Smoker    Smokeless tobacco: Never Used Substance and Sexual Activity    Alcohol use: No    Drug use: No    Sexual activity: Not Currently   Lifestyle    Physical activity     Days per week: Not on file     Minutes per session: Not on file    Stress: Not on file   Relationships    Social connections     Talks on phone: Not on file     Gets together: Not on file     Attends Voodoo service: Not on file     Active member of club or organization: Not on file     Attends meetings of clubs or organizations: Not on file     Relationship status: Not on file    Intimate partner violence     Fear of current or ex partner: Not on file     Emotionally abused: Not on file     Physically abused: Not on file     Forced sexual activity: Not on file   Other Topics Concern    Not on file   Social History Narrative    Not on file     No family history on file. Current Outpatient Medications   Medication Sig    meclizine (ANTIVERT) 25 mg tablet Take 1 Tab by mouth three (3) times daily as needed for Dizziness.  gabapentin (NEURONTIN) 300 mg capsule Take 1 Cap by mouth three (3) times daily (with meals). Max Daily Amount: 900 mg. Indications: neuropathic pain    ibuprofen (ADVIL) 200 mg tablet Take 400 mg by mouth every eight (8) hours as needed for Pain.  GLUC/CHND/OM3/DHA/EPA/FISH/STR (GLUCOSAMINE CHONDROITIN PLUS PO) Take  by mouth.  cyanocobalamin (VITAMIN B-12) 1,000 mcg tablet Take 1,000 mcg by mouth daily.  Cholecalciferol, Vitamin D3, (VITAMIN D) 1,000 unit Cap Take 2,000 Units by mouth.  TURMERIC PO Take  by mouth.  omega-3 fatty acids-vitamin e (FISH OIL) 1,000 mg cap Take 1 Cap by mouth. No current facility-administered medications for this visit.       Allergies   Allergen Reactions    Sulfa (Sulfonamide Antibiotics) Nausea Only    Tramadol Nausea and Vomiting     REVIEW OF SYSTEMS: roberto Briceño 2012  Ophtho  no vision change or eye pain  Oral  no mouth pain, tongue or tooth problems  Ears  no hearing loss, ear pain, fullness, no swallowing problems  Cardiac  no CP, PND, orthopnea, edema, palpitations or syncope  Chest  no breast masses  Resp  no wheezing, chronic coughing, dyspnea  GI  no heartburn, nausea, vomiting, change in bowel habits, bleeding, hemorrhoids  Urinary  no dysuria, hematuria, flank pain, urgency, frequency    Visit Vitals  /75   Pulse 64   Temp 97.7 °F (36.5 °C) (Temporal)   Resp 14   Ht 5' 11\" (1.803 m)   Wt 170 lb 12.8 oz (77.5 kg)   SpO2 95%   BMI 23.82 kg/m²      Affect is appropriate. Mood stable  No apparent distress  HEENT --Anicteric sclerae, tympanic membranes normal,  ear canals normal.  PERRL, EOMI, conjunctiva and lids normal. No thyromegaly, JVD, or bruits. Lungs --Clear to auscultation, normal percussion. Heart --Regular rate and rhythm, no murmurs, rubs, gallops, or clicks. Chest wall --Nontender to palpation. PMI normal.  Abdomen -- Soft and nontender, no hepatosplenomegaly or masses. Prostate  -- no asymmetry, nodularity, tenderness or enlargement  Rectal  -- normal tone, guaiac negative brown stool  Extremities -- Without cyanosis, clubbing, edema. 2+ pulses equally and bilaterally.     LABS  From 9/14 showed gluc 90,   cr 1.43, gfr 47,  alt<5,  chol 149, tg 56, hdl 59, ldl-c 79, wbc 5.7, hb 13.2, plt 170, ua tr bl, vit d 46.4  From 2/15 showed gluc 114, cr 1.06, gfr>60, alt 17,        wbc 6.9, hb 12.9, plt 187  From 9/15 showed gluc 89,   cr 0.93, gfr 75,  alt 13, chol 165, tg 92, hdl 66, ldl-c 81, wbc 5.8, hb 14.0, plt 198, ua tr bl, vit d 47.3,           b12 486  From 8/16 showed gluc 148, cr 1.16, gfr 60,  alt 20,                       ck/trop neg  From 8/16 showed gluc 80,   cr 0.97, gfr>60, alt 28, chol 118, tg 71, hdl 46, ldl-c 58, wbc 5.7, hb 12.5, plt 341, ua tr bl, vit d 40.9, tsh 1.04  From 9/17 showed gluc 89,   cr 1.01, gfr>60, alt 20, chol 152, tg 76, hdl 74, ldl-c 63, wbc 5.5, hb 13.7, plt 195, ua neg, vit d 52.0, tsh 1.32  From 9/18 showed gluc 83, cr 0.87, gfr>60, alt 20,        wbc 5.1, hb 13.2, plt 192,       tsh 1.40, ft4 0.90, b12 537, fol>20  From 9/19 showed gluc 84,   cr 0.86, gfr>60, alt 22,        wbc 4.7, hb 12.6, plt 188    Results for orders placed or performed during the hospital encounter of 09/21/20   CBC W/O DIFF   Result Value Ref Range    WBC 5.2 4.6 - 13.2 K/uL    RBC 4.22 (L) 4.70 - 5.50 M/uL    HGB 12.8 (L) 13.0 - 16.0 g/dL    HCT 39.3 36.0 - 48.0 %    MCV 93.1 74.0 - 97.0 FL    MCH 30.3 24.0 - 34.0 PG    MCHC 32.6 31.0 - 37.0 g/dL    RDW 14.3 11.6 - 14.5 %    PLATELET 177 166 - 724 K/uL    MPV 10.7 9.2 - 03.1 FL   METABOLIC PANEL, COMPREHENSIVE   Result Value Ref Range    Sodium 142 136 - 145 mmol/L    Potassium 4.3 3.5 - 5.5 mmol/L    Chloride 108 100 - 111 mmol/L    CO2 31 21 - 32 mmol/L    Anion gap 3 3.0 - 18 mmol/L    Glucose 88 74 - 99 mg/dL    BUN 16 7.0 - 18 MG/DL    Creatinine 0.96 0.6 - 1.3 MG/DL    BUN/Creatinine ratio 17 12 - 20      GFR est AA >60 >60 ml/min/1.73m2    GFR est non-AA >60 >60 ml/min/1.73m2    Calcium 8.9 8.5 - 10.1 MG/DL    Bilirubin, total 1.1 (H) 0.2 - 1.0 MG/DL    ALT (SGPT) 19 16 - 61 U/L    AST (SGOT) 18 10 - 38 U/L    Alk. phosphatase 82 45 - 117 U/L    Protein, total 6.4 6.4 - 8.2 g/dL    Albumin 3.5 3.4 - 5.0 g/dL    Globulin 2.9 2.0 - 4.0 g/dL    A-G Ratio 1.2 0.8 - 1.7       Patient Active Problem List   Diagnosis Code    Impotence of organic origin N52.9    Right bundle branch block I45.10    Colon polyp K63.5    Osteoporosis s/p compression fx L4, fx wrist  M81.0    Nephrolithiasis N20.0    Inguinal hernia, right Dr Ton Hooper K40.90    Low back pain with left-sided sciatica M54.42    Advance directive in chart Z78.9     Assessment and plan:  1. Colon polyp. Fiber  2. Osteoporosis. Ca/d, weight bearing exercise. 3. Nephrolithiasis. Hydration  4. RIH. Observation, saw Dr Ton Hooper previously  5. Spine. Per Dr Dhaval Rooney  6. Anemia. Proceed with eval and go from there.   Quick discussion abot potential etiologies      RTC 9/21    Above conditions discussed at length and patient vocalized understanding.   All questions answered to patient satisfaction

## 2020-09-28 ENCOUNTER — OFFICE VISIT (OUTPATIENT)
Dept: INTERNAL MEDICINE CLINIC | Age: 85
End: 2020-09-28
Payer: MEDICARE

## 2020-09-28 ENCOUNTER — HOSPITAL ENCOUNTER (OUTPATIENT)
Dept: LAB | Age: 85
Discharge: HOME | End: 2020-09-28
Payer: MEDICARE

## 2020-09-28 VITALS
TEMPERATURE: 97.7 F | BODY MASS INDEX: 23.91 KG/M2 | OXYGEN SATURATION: 95 % | HEIGHT: 71 IN | WEIGHT: 170.8 LBS | HEART RATE: 64 BPM | SYSTOLIC BLOOD PRESSURE: 110 MMHG | DIASTOLIC BLOOD PRESSURE: 75 MMHG | RESPIRATION RATE: 14 BRPM

## 2020-09-28 DIAGNOSIS — M81.0 AGE-RELATED OSTEOPOROSIS WITHOUT CURRENT PATHOLOGICAL FRACTURE: ICD-10-CM

## 2020-09-28 DIAGNOSIS — D64.9 ANEMIA, UNSPECIFIED TYPE: ICD-10-CM

## 2020-09-28 DIAGNOSIS — Z13.1 SCREENING FOR DIABETES MELLITUS: ICD-10-CM

## 2020-09-28 DIAGNOSIS — Z00.00 MEDICARE ANNUAL WELLNESS VISIT, SUBSEQUENT: Primary | ICD-10-CM

## 2020-09-28 DIAGNOSIS — N20.0 NEPHROLITHIASIS: ICD-10-CM

## 2020-09-28 DIAGNOSIS — Z13.31 SCREENING FOR DEPRESSION: ICD-10-CM

## 2020-09-28 DIAGNOSIS — K63.5 HYPERPLASTIC COLONIC POLYP, UNSPECIFIED PART OF COLON: ICD-10-CM

## 2020-09-28 DIAGNOSIS — Z71.89 ADVANCED DIRECTIVES, COUNSELING/DISCUSSION: ICD-10-CM

## 2020-09-28 LAB
FERRITIN SERPL-MCNC: 107 NG/ML (ref 8–388)
FOLATE SERPL-MCNC: >20 NG/ML (ref 3.1–17.5)
IRON SATN MFR SERPL: 30 % (ref 20–50)
IRON SERPL-MCNC: 83 UG/DL (ref 50–175)
RETICS/RBC NFR AUTO: 0.7 % (ref 0.5–2.3)
TIBC SERPL-MCNC: 273 UG/DL (ref 250–450)
VIT B12 SERPL-MCNC: 500 PG/ML (ref 211–911)

## 2020-09-28 PROCEDURE — 99214 OFFICE O/P EST MOD 30 MIN: CPT | Performed by: INTERNAL MEDICINE

## 2020-09-28 PROCEDURE — G8420 CALC BMI NORM PARAMETERS: HCPCS | Performed by: INTERNAL MEDICINE

## 2020-09-28 PROCEDURE — 83921 ORGANIC ACID SINGLE QUANT: CPT

## 2020-09-28 PROCEDURE — 1101F PT FALLS ASSESS-DOCD LE1/YR: CPT | Performed by: INTERNAL MEDICINE

## 2020-09-28 PROCEDURE — G8536 NO DOC ELDER MAL SCRN: HCPCS | Performed by: INTERNAL MEDICINE

## 2020-09-28 PROCEDURE — 83540 ASSAY OF IRON: CPT

## 2020-09-28 PROCEDURE — 82607 VITAMIN B-12: CPT

## 2020-09-28 PROCEDURE — G0439 PPPS, SUBSEQ VISIT: HCPCS | Performed by: INTERNAL MEDICINE

## 2020-09-28 PROCEDURE — 82272 OCCULT BLD FECES 1-3 TESTS: CPT | Performed by: INTERNAL MEDICINE

## 2020-09-28 PROCEDURE — 84155 ASSAY OF PROTEIN SERUM: CPT

## 2020-09-28 PROCEDURE — 82728 ASSAY OF FERRITIN: CPT

## 2020-09-28 PROCEDURE — G8510 SCR DEP NEG, NO PLAN REQD: HCPCS | Performed by: INTERNAL MEDICINE

## 2020-09-28 PROCEDURE — G8427 DOCREV CUR MEDS BY ELIG CLIN: HCPCS | Performed by: INTERNAL MEDICINE

## 2020-09-28 PROCEDURE — 85045 AUTOMATED RETICULOCYTE COUNT: CPT

## 2020-09-28 PROCEDURE — G0444 DEPRESSION SCREEN ANNUAL: HCPCS | Performed by: INTERNAL MEDICINE

## 2020-09-28 PROCEDURE — 99497 ADVNCD CARE PLAN 30 MIN: CPT | Performed by: INTERNAL MEDICINE

## 2020-09-28 NOTE — ACP (ADVANCE CARE PLANNING)
Advance Care Planning       Advance Care Planning (ACP) Physician/NP/PA (Provider) Conversation        Date of ACP Conversation: 9/28/2020    Conversation Conducted with:   Patient with Decision Making Osiel Pa Maker:    Current Designated Health Care Decision Maker:   (If there is a [address] named in the [address]" box in the ACP activity, but it is not visible above, be sure to open that field and then select the health care decision maker relationship (ie \"primary\") in the blank space to the right of the name.)    Note: Assess and validate information in current ACP documents, as indicated. If no Authorized Decision Maker has previously been identified, then patient chooses [place]:  \"Who would you like to name as your primary health care decision-maker? \"    Name: Toño Bacon   Relationship: wife Phone number:   Kobe Doty this person be reached easily? \" YES  \"Who would you like to name as your back-up decision maker? \"   Name:   Relationship:  Phone number:   \"Can this person be reached easily? \" NO    Note: If the relationship of these Decision-Makers to the patient does NOT follow your state's Next of Kin hierarchy, recommend that patient complete ACP document that meets state-specific requirements to allow them to act on the patient's behalf when appropriate. Care Preferences:    Hospitalization: \"If your health worsens and it becomes clear that your chance of recovery is unlikely, what would your preference be regarding hospitalization? \"  If the patient would want hospitalization, answer \"yes\". If the patient would prefer comfort-focused treatment without hospitalization, answer \"no\". yes      Ventilation: \"If you were in your present state of health and suddenly became very ill and were unable to breathe on your own, what would your preference be about the use of a ventilator (breathing machine) if it was available to you? \" If patient would desire the use of a ventilator (breathing machine), answer \"yes\", if not answer \"no\":yes    \"If your health worsens and it becomes clear that your chance of recovery is unlikely, what would your preference be about the use of a ventilator (breathing machine) if it was available to you? \"   yes      Resuscitation:  \"CPR works best to restart the heart when there is a sudden event, like a heart attack, in someone who is otherwise healthy. Unfortunately, CPR does not typically restart the heart for people who have serious health conditions or who are very sick. \"    \"In the event your heart stopped as a result of an underlying serious health condition, would you want attempts to be made to restart your heart (answer \"yes\" for attempt to resuscitate) or would you prefer a natural death (answer \"no\" for do not attempt to resuscitate)? \"   yes    NOTE: If the patient has a valid advance directive AND provides care preference(s) that are inconsistent with that prior directive, advise the patient to consider either: creating a new advance directive that complies with state-specific requirements; or, if that is not possible, orally revoking that prior directive in accordance with state-specific requirements, which must be documented in the EHR.     Conversation Outcomes / Follow-Up Plan:   No change in amd      Length of Voluntary ACP Conversation in minutes:  16 minutes      Neelima Michael MD

## 2020-09-28 NOTE — PATIENT INSTRUCTIONS
Medicare Wellness Visit, Male The best way to live healthy is to have a lifestyle where you eat a well-balanced diet, exercise regularly, limit alcohol use, and quit all forms of tobacco/nicotine, if applicable. Regular preventive services are another way to keep healthy. Preventive services (vaccines, screening tests, monitoring & exams) can help personalize your care plan, which helps you manage your own care. Screening tests can find health problems at the earliest stages, when they are easiest to treat. [facility] follows the current, evidence-based guidelines published by the [facility] ([facility]) when recommending preventive services for our patients. Because we follow these guidelines, sometimes recommendations change over time as research supports it. (For example, a prostate screening blood test is no longer routinely recommended for men with no symptoms). Of course, you and your doctor may decide to screen more often for some diseases, based on your risk and co-morbidities (chronic disease you are already diagnosed with). Preventive services for you include: - Medicare offers their members a free annual wellness visit, which is time for you and your primary care provider to discuss and plan for your preventive service needs. Take advantage of this benefit every year! 
-All adults over age 72 should receive the recommended pneumonia vaccines. Current USPSTF guidelines recommend a series of two vaccines for the best pneumonia protection.  
-All adults should have a flu vaccine yearly and tetanus vaccine every 10 years. 
-All adults age 48 and older should receive the shingles vaccines (series of two vaccines).       
-All adults age 38-68 who are overweight should have a diabetes screening test once every three years.  
-Other screening tests & preventive services for persons with diabetes include: an eye exam to screen for diabetic retinopathy, a kidney function test, a foot exam, and stricter control over your cholesterol.  
-Cardiovascular screening for adults with routine risk involves an electrocardiogram (ECG) at intervals determined by the provider.  
-Colorectal cancer screening should be done for adults age 54-65 with no increased risk factors for colorectal cancer. There are a number of acceptable methods of screening for this type of cancer. Each test has its own benefits and drawbacks. Discuss with your provider what is most appropriate for you during your annual wellness visit. The different tests include: colonoscopy (considered the best screening method), a fecal occult blood test, a fecal DNA test, and sigmoidoscopy. 
-All adults born between [facility] should be screened once for Hepatitis C. 
-An Abdominal Aortic Aneurysm (AAA) Screening is recommended for men age 73-68 who has ever smoked in their lifetime. Here is a list of your current Health Maintenance items (your personalized list of preventive services) with a due date: 
Health Maintenance Due Topic Date Due  
 DTaP/Tdap/Td  (1 - Tdap) 01/31/1951  Shingles Vaccine (1 of 2) 01/31/1980  Glaucoma Screening   01/31/1995  Yearly Flu Vaccine (1) 09/01/2020 [address] [place] [address] Annual Well Visit  09/26/2020

## 2020-09-29 LAB
HEMOCCULT STL QL: NEGATIVE
VALID INTERNAL CONTROL?: YES

## 2020-10-01 LAB
ALBUMIN SERPL ELPH-MCNC: 3.8 G/DL (ref 2.9–4.4)
ALBUMIN/GLOB SERPL: 1.5 {RATIO} (ref 0.7–1.7)
ALPHA1 GLOB SERPL ELPH-MCNC: 0.2 G/DL (ref 0–0.4)
ALPHA2 GLOB SERPL ELPH-MCNC: 0.6 G/DL (ref 0.4–1)
B-GLOBULIN SERPL ELPH-MCNC: 0.7 G/DL (ref 0.7–1.3)
GAMMA GLOB SERPL ELPH-MCNC: 0.9 G/DL (ref 0.4–1.8)
GLOBULIN SER CALC-MCNC: 2.5 G/DL (ref 2.2–3.9)
M PROTEIN SERPL ELPH-MCNC: NORMAL G/DL
PROT SERPL-MCNC: 6.3 G/DL (ref 6–8.5)

## 2020-10-03 LAB
Lab: NORMAL
METHYLMALONATE SERPL-SCNC: 107 NMOL/L (ref 0–378)

## 2020-10-05 ENCOUNTER — TELEPHONE (OUTPATIENT)
Dept: INTERNAL MEDICINE CLINIC | Age: 85
End: 2020-10-05

## 2020-10-05 NOTE — TELEPHONE ENCOUNTER
pls call    Results for orders placed or performed during the hospital encounter of 09/28/20   FERRITIN   Result Value Ref Range    Ferritin 107 8 - 388 NG/ML   METHYLMALONIC ACID   Result Value Ref Range    Methylmalonic acid 107 0 - 378 nmol/L    Disclaimer Comment     PROTEIN ELECTROPHORESIS   Result Value Ref Range    Protein, total 6.3 6.0 - 8.5 g/dL    Albumin 3.8 2.9 - 4.4 g/dL    Alpha-1-globulin 0.2 0.0 - 0.4 g/dL    ALPHA-2 GLOBULIN 0.6 0.4 - 1.0 g/dL    Beta globulin 0.7 0.7 - 1.3 g/dL    Gamma globulin 0.9 0.4 - 1.8 g/dL    M-Christopher Not Observed Not Observed g/dL    Globulin, total 2.5 2.2 - 3.9 g/dL    A/G ratio 1.5 0.7 - 1.7     RETICULOCYTE COUNT   Result Value Ref Range    Reticulocyte count 0.7 0.5 - 2.3 %   VITAMIN B12 & FOLATE   Result Value Ref Range    Vitamin B12 500 211 - 911 pg/mL    Folate >20.0 (H) 3.10 - 17.50 ng/mL   IRON PROFILE   Result Value Ref Range    Iron 83 50 - 175 ug/dL    TIBC 273 250 - 450 ug/dL    Iron % saturation 30 20 - 50 %     Labs ok  No obvious reason for anemia  Will just recheck at next visit

## 2020-10-05 NOTE — TELEPHONE ENCOUNTER
Called and spoke with patient. Gave him message below. He verbalized understanding. He does not have a future appointment scheduled right now. When do you want to see him again? Also he wants to know if he still needs the dexa scan that was ordered for his osteoporosis last year. He never had this done and needs to know if he should schedule it.

## 2020-10-05 NOTE — TELEPHONE ENCOUNTER
No need to do DEXA if he's not interested in tx or f/u    Would recheck at his next ov/rpe - he's usually yearly schedule

## 2020-12-15 ENCOUNTER — TELEPHONE (OUTPATIENT)
Dept: INTERNAL MEDICINE CLINIC | Age: 85
End: 2020-12-15

## 2020-12-15 DIAGNOSIS — M54.2 NECK PAIN: Primary | ICD-10-CM

## 2020-12-15 DIAGNOSIS — M54.16 LUMBAR NEURITIS: ICD-10-CM

## 2020-12-15 DIAGNOSIS — M51.36 DDD (DEGENERATIVE DISC DISEASE), LUMBAR: ICD-10-CM

## 2020-12-15 DIAGNOSIS — S32.040A CLOSED WEDGE COMPRESSION FRACTURE OF L4 VERTEBRA, INITIAL ENCOUNTER (HCC): ICD-10-CM

## 2020-12-15 NOTE — TELEPHONE ENCOUNTER
pls get details  No record of any discussion about his neck although he is known to have lumbar spine issues

## 2020-12-16 RX ORDER — GABAPENTIN 300 MG/1
CAPSULE ORAL
Qty: 270 CAP | Refills: 1 | Status: SHIPPED | OUTPATIENT
Start: 2020-12-16 | End: 2021-05-20 | Stop reason: SDUPTHER

## 2020-12-18 ENCOUNTER — HOSPITAL ENCOUNTER (OUTPATIENT)
Dept: GENERAL RADIOLOGY | Age: 85
Discharge: HOME | End: 2020-12-18
Payer: MEDICARE

## 2020-12-18 DIAGNOSIS — M54.2 NECK PAIN: ICD-10-CM

## 2020-12-18 PROCEDURE — 72040 X-RAY EXAM NECK SPINE 2-3 VW: CPT

## 2020-12-18 NOTE — TELEPHONE ENCOUNTER
Pt stated it happens mostly at night. Pain originates in neck then travels around ear to right side of head. Dull pain that last about 2 to 5 mins. Happens mostly everyday. Been going on a couple of years but has worsen.

## 2020-12-20 ENCOUNTER — TELEPHONE (OUTPATIENT)
Dept: INTERNAL MEDICINE CLINIC | Age: 85
End: 2020-12-20

## 2020-12-24 ENCOUNTER — VIRTUAL VISIT (OUTPATIENT)
Dept: INTERNAL MEDICINE CLINIC | Age: 85
End: 2020-12-24
Payer: MEDICARE

## 2020-12-24 DIAGNOSIS — M54.2 NECK PAIN: Primary | ICD-10-CM

## 2020-12-24 DIAGNOSIS — M47.812 OSTEOARTHRITIS OF CERVICAL SPINE, UNSPECIFIED SPINAL OSTEOARTHRITIS COMPLICATION STATUS: ICD-10-CM

## 2020-12-24 DIAGNOSIS — G44.219 EPISODIC TENSION-TYPE HEADACHE, NOT INTRACTABLE: ICD-10-CM

## 2020-12-24 DIAGNOSIS — M62.838 MUSCLE SPASM: ICD-10-CM

## 2020-12-24 PROCEDURE — G8427 DOCREV CUR MEDS BY ELIG CLIN: HCPCS | Performed by: INTERNAL MEDICINE

## 2020-12-24 PROCEDURE — 1101F PT FALLS ASSESS-DOCD LE1/YR: CPT | Performed by: INTERNAL MEDICINE

## 2020-12-24 PROCEDURE — G8432 DEP SCR NOT DOC, RNG: HCPCS | Performed by: INTERNAL MEDICINE

## 2020-12-24 PROCEDURE — 99214 OFFICE O/P EST MOD 30 MIN: CPT | Performed by: INTERNAL MEDICINE

## 2020-12-24 RX ORDER — METHYLPREDNISOLONE 4 MG/1
TABLET ORAL
Qty: 1 DOSE PACK | Refills: 0 | Status: SHIPPED | OUTPATIENT
Start: 2020-12-24 | End: 2021-01-05 | Stop reason: ALTCHOICE

## 2020-12-24 RX ORDER — CYCLOBENZAPRINE HCL 10 MG
10 TABLET ORAL
Qty: 30 TAB | Refills: 0 | Status: SHIPPED | OUTPATIENT
Start: 2020-12-24 | End: 2021-10-04

## 2020-12-24 NOTE — PROGRESS NOTES
Gennaro Boxer is a 80 y.o. male who was seen by synchronous (real-time) audio-video technology on 12/24/2020 for No chief complaint on file. Assessment & Plan:   Diagnoses and all orders for this visit:    1. Neck pain  -     methylPREDNISolone (MEDROL DOSEPACK) 4 mg tablet; Per dose pack instructions    2. Episodic tension-type headache, not intractable  -     cyclobenzaprine (FLEXERIL) 10 mg tablet; Take 1 Tab by mouth three (3) times daily as needed for Muscle Spasm(s). 3. Osteoarthritis of cervical spine, unspecified spinal osteoarthritis complication status  -     methylPREDNISolone (MEDROL DOSEPACK) 4 mg tablet; Per dose pack instructions    4. Muscle spasm  -     cyclobenzaprine (FLEXERIL) 10 mg tablet; Take 1 Tab by mouth three (3) times daily as needed for Muscle Spasm(s). I spent at least 21 minutes on this visit with this established patient. 712  Subjective:     Objective:   No flowsheet data found. General: alert, cooperative, no distress   Mental  status: normal mood, behavior, speech, dress, motor activity, and thought processes, able to follow commands   HENT: NCAT   Neck: no visualized mass   Resp: no respiratory distress   Neuro: no gross deficits   Skin: no discoloration or lesions of concern on visible areas   Psychiatric: normal affect, consistent with stated mood, no evidence of hallucinations     Additional exam findings: We discussed the expected course, resolution and complications of the diagnosis(es) in detail. Medication risks, benefits, costs, interactions, and alternatives were discussed as indicated. I advised him to contact the office if his condition worsens, changes or fails to improve as anticipated. He expressed understanding with the diagnosis(es) and plan.        Gennaro Boxer, who was evaluated through a patient-initiated, synchronous (real-time) audio-video encounter, and/or his healthcare decision maker, is aware that it is a billable service, with coverage as determined by his insurance carrier. He provided verbal consent to proceed: Yes, and patient identification was verified. It was conducted pursuant to the emergency declaration under the [facility] [address] and the [employer] Act. A caregiver was present when appropriate. Ability to conduct physical exam was limited. I was in the office. The patient was at home. Kimo Méndez MD    He called last week complaining of pain in his neck radiating to his scalp. We got x-rays on 12/18/2020 and this call is about discussing those results. He is known to have lumbar spine disease but not cervical spine disease. He had mentioned apparently at previous visits that he was having these symptoms. Not bad enough to address then but bad enough to address now. Denies focal neurologic complaints. The pain invariably starts in the neck region, usually most prominent when he is lying down trying to go to bed. Denies any radicular symptoms down the arms. No vision change, jaw claudication, focal neuro complaints, he thinks it is a pain that is outside of the bony skull. There is not been any trauma, bruising, rash. Ongoing for weeks to months now. Spine films showed moderate to severe discogenic degenerative disease from C5-T1, asymmetric left facet hypertrophy from C4-C5. He has not really tried anything consistently for this. Past Medical History:   Diagnosis Date    B12 deficiency     Closed compression fracture of L4 lumbar vertebra     Colon polyps     Dr Ranulfo Lorenzo Degenerative arthritis of cervical spine 12/2020    xray showed mod/sev discogenic degen changes C5-T1, asymmetric L facet hypertrophy C3-5    Degenerative arthritis of lumbar spine     MRI 11/14 w multilevel disease; chronic lbp w sciatica saw Dr Paxton Telles    Distal radial fracture, right wrist 02/2015    Kelvin Kim on ice. s/p ORIF with right dorsal endoplate nail, two proximal screws, and four distal pegs. Dr. Aubrey Omalley.     Diverticulosis     on CT 4/16    Erectile dysfunction     Nephrolithiasis 04/2016    right tiny stone on CT; microhematuria    Osteoporosis     ibandronate 2009-5/13 stopped due to tooth issues per his endodontist;  DEXA t score -2.4 spibe, -2.1 hip (8/11); -2.3 spine, -1.9 hip (8/13); -2.4 spine, -2.4 hip (9/15)    Proctalgia fugax     RBBB (right bundle branch block)     Right inguinal hernia     Dr Forrest Watkins Tinnitus      Past Surgical History:   Procedure Laterality Date    Ryan Stahl HX COLONOSCOPY      Dr Nicanor Muñoz 2012 negative    HX HERNIA REPAIR  2002    [facility] repair     Social History     Socioeconomic History    Marital status:      Spouse name: Not on file    Number of children: Not on file    Years of education: Not on file    Highest education level: Not on file   Occupational History    Not on file   Social Needs    Financial resource strain: Not on file    Food insecurity     Worry: Not on file     Inability: Not on file    Transportation needs     Medical: Not on file     Non-medical: Not on file   Tobacco Use    Smoking status: Never Smoker    Smokeless tobacco: Never Used   Substance and Sexual Activity    Alcohol use: No    Drug use: No    Sexual activity: Not Currently   Lifestyle    Physical activity     Days per week: Not on file     Minutes per session: Not on file    Stress: Not on file   Relationships    Social connections     Talks on phone: Not on file     Gets together: Not on file     Attends Sikhism service: Not on file     Active member of club or organization: Not on file     Attends meetings of clubs or organizations: Not on file     Relationship status: Not on file    Intimate partner violence     Fear of current or ex partner: Not on file     Emotionally abused: Not on file     Physically abused: Not on file     Forced sexual activity: Not on file   Other Topics Concern    Not on file   Social History Narrative    Not on file     Current Outpatient Medications   Medication Sig    methylPREDNISolone (MEDROL DOSEPACK) 4 mg tablet Per dose pack instructions    cyclobenzaprine (FLEXERIL) 10 mg tablet Take 1 Tab by mouth three (3) times daily as needed for Muscle Spasm(s).  gabapentin (NEURONTIN) 300 mg capsule TAKE 1 CAPSULE THREE TIMES A DAY WITH MEALS FOR NEUROPATHIC PAIN    meclizine (ANTIVERT) 25 mg tablet Take 1 Tab by mouth three (3) times daily as needed for Dizziness.  TURMERIC PO Take  by mouth.  ibuprofen (ADVIL) 200 mg tablet Take 400 mg by mouth every eight (8) hours as needed for Pain.  GLUC/CHND/OM3/DHA/EPA/FISH/STR (GLUCOSAMINE CHONDROITIN PLUS PO) Take  by mouth.  cyanocobalamin (VITAMIN B-12) 1,000 mcg tablet Take 1,000 mcg by mouth daily.  omega-3 fatty acids-vitamin e (FISH OIL) 1,000 mg cap Take 1 Cap by mouth.  Cholecalciferol, Vitamin D3, (VITAMIN D) 1,000 unit Cap Take 2,000 Units by mouth. No current facility-administered medications for this visit. Allergies   Allergen Reactions    Sulfa (Sulfonamide Antibiotics) Nausea Only    Tramadol Nausea and Vomiting     Assessment and plan:  1. Probable neck pain from degenerative cervical spine disease with probable tension headache associated with that. We will give him a Medrol Dosepak along with Flexeril, he may take OTC NSAIDs as needed thereafter. If no improvement will send to spine. Side effects discussed. Above conditions discussed at length and patient vocalized understanding.   All questions answered to patient satisfaction

## 2021-01-05 ENCOUNTER — OFFICE VISIT (OUTPATIENT)
Dept: INTERNAL MEDICINE CLINIC | Age: 86
End: 2021-01-05
Payer: MEDICARE

## 2021-01-05 VITALS
WEIGHT: 170 LBS | SYSTOLIC BLOOD PRESSURE: 123 MMHG | BODY MASS INDEX: 23.8 KG/M2 | OXYGEN SATURATION: 95 % | HEART RATE: 69 BPM | HEIGHT: 71 IN | DIASTOLIC BLOOD PRESSURE: 77 MMHG | TEMPERATURE: 97.7 F | RESPIRATION RATE: 14 BRPM

## 2021-01-05 DIAGNOSIS — L98.9 SKIN LESION: ICD-10-CM

## 2021-01-05 DIAGNOSIS — M54.2 NECK PAIN: ICD-10-CM

## 2021-01-05 DIAGNOSIS — S61.211D LACERATION OF LEFT INDEX FINGER WITHOUT FOREIGN BODY WITHOUT DAMAGE TO NAIL, SUBSEQUENT ENCOUNTER: Primary | ICD-10-CM

## 2021-01-05 PROCEDURE — G8510 SCR DEP NEG, NO PLAN REQD: HCPCS | Performed by: INTERNAL MEDICINE

## 2021-01-05 PROCEDURE — G8536 NO DOC ELDER MAL SCRN: HCPCS | Performed by: INTERNAL MEDICINE

## 2021-01-05 PROCEDURE — 99214 OFFICE O/P EST MOD 30 MIN: CPT | Performed by: INTERNAL MEDICINE

## 2021-01-05 PROCEDURE — G8420 CALC BMI NORM PARAMETERS: HCPCS | Performed by: INTERNAL MEDICINE

## 2021-01-05 PROCEDURE — G8427 DOCREV CUR MEDS BY ELIG CLIN: HCPCS | Performed by: INTERNAL MEDICINE

## 2021-01-05 PROCEDURE — 1101F PT FALLS ASSESS-DOCD LE1/YR: CPT | Performed by: INTERNAL MEDICINE

## 2021-01-05 NOTE — PROGRESS NOTES
80 y.o. WHITE OR  male who presents for evaluation. He is here to follow-up after his [facility] visit on 12/24/2020. He was doing some woodworking and sustained a laceration in the dorsal portion of the left forefinger close to the PIP joint. They placed 5 sutures into the region, sent him home on Keflex and told him to follow-up with us for suture removal.  He seems to doing well, the wound is almost fully apposed and closed except for the distal part. No pain, redness, drainage. Secondly, he has a spot on the left nose that he would like evaluated. Thirdly, he wanted to update us on his C-spine issues. The steroid taper did help but he continues to have some discomfort there. No radicular symptoms    Past Medical History:   Diagnosis Date    B12 deficiency     Closed compression fracture of L4 lumbar vertebra     Colon polyps     Dr Ivette Mcdonough Degenerative arthritis of cervical spine 12/2020    xray showed mod/sev discogenic degen changes C5-T1, asymmetric L facet hypertrophy C3-5    Degenerative arthritis of lumbar spine     MRI 11/14 w multilevel disease; chronic lbp w sciatica saw Dr Quyen Luna    Distal radial fracture, right wrist 02/2015    Fell on ice. s/p ORIF with right dorsal endoplate nail, two proximal screws, and four distal pegs. Dr. Shadi Lovelace.     Diverticulosis     on CT 4/16    Erectile dysfunction     Nephrolithiasis 04/2016    right tiny stone on CT; microhematuria    Osteoporosis     ibandronate 2009-5/13 stopped due to tooth issues per his endodontist;  DEXA t score -2.4 spibe, -2.1 hip (8/11); -2.3 spine, -1.9 hip (8/13); -2.4 spine, -2.4 hip (9/15)    Proctalgia fugax     RBBB (right bundle branch block)     Right inguinal hernia     Dr oKng Dark Tinnitus      Past Surgical History:   Procedure Laterality Date    HX CATARACT REMOVAL      HX COLONOSCOPY      Dr Ramin Murillo 2012 negative    HX HERNIA REPAIR  2002    [facility] repair     Social History     Socioeconomic History    Marital status:      Spouse name: Not on file    Number of children: Not on file    Years of education: Not on file    Highest education level: Not on file   Occupational History    Not on file   Social Needs    Financial resource strain: Not on file    Food insecurity     Worry: Not on file     Inability: Not on file    Transportation needs     Medical: Not on file     Non-medical: Not on file   Tobacco Use    Smoking status: Never Smoker    Smokeless tobacco: Never Used   Substance and Sexual Activity    Alcohol use: No    Drug use: No    Sexual activity: Not Currently   Lifestyle    Physical activity     Days per week: Not on file     Minutes per session: Not on file    Stress: Not on file   Relationships    Social connections     Talks on phone: Not on file     Gets together: Not on file     Attends [employer] service: Not on file     Active member of club or organization: Not on file     Attends meetings of clubs or organizations: Not on file     Relationship status: Not on file    Intimate partner violence     Fear of current or ex partner: Not on file     Emotionally abused: Not on file     Physically abused: Not on file     Forced sexual activity: Not on file   Other Topics Concern    Not on file   Social History Narrative    Not on file     Current Outpatient Medications   Medication Sig    gabapentin (NEURONTIN) 300 mg capsule TAKE 1 CAPSULE THREE TIMES A DAY WITH MEALS FOR NEUROPATHIC PAIN    meclizine (ANTIVERT) 25 mg tablet Take 1 Tab by mouth three (3) times daily as needed for Dizziness.  ibuprofen (ADVIL) 200 mg tablet Take 400 mg by mouth every eight (8) hours as needed for Pain.  GLUC/CHND/OM3/DHA/EPA/FISH/STR (GLUCOSAMINE CHONDROITIN PLUS PO) Take  by mouth.  cyanocobalamin (VITAMIN B-12) 1,000 mcg tablet Take 1,000 mcg by mouth daily.  Cholecalciferol, Vitamin D3, (VITAMIN D) 1,000 unit Cap Take 2,000 Units by mouth.     cyclobenzaprine (FLEXERIL) 10 mg tablet Take 1 Tab by mouth three (3) times daily as needed for Muscle Spasm(s).  TURMERIC PO Take  by mouth.  omega-3 fatty acids-vitamin e (FISH OIL) 1,000 mg cap Take 1 Cap by mouth. No current facility-administered medications for this visit. Allergies   Allergen Reactions    Sulfa (Sulfonamide Antibiotics) Nausea Only    Tramadol Nausea and Vomiting     Visit Vitals  /77   Pulse 69   Temp 97.7 °F (36.5 °C) (Temporal)   Resp 14   Ht 5' 11\" (1.803 m)   Wt 170 lb (77.1 kg)   SpO2 95%   BMI 23.71 kg/m²   Wound site looks good, good granulation tissue, no drainage or erythema noted. The 3 proximal sutures were removed by the nurse, 2 will be left in to be removed next week. Possible BCC left lateral nose superiorly    Assessment and plan:  1. Status post finger laceration. Doing well, return in 1 week for removal of the other 2 sutures  2. Dermatology. He will call Dr. Jorge Mike for evaluation for possible BCC  3. Neck pain, cervical spine disease. Quick discussion, he is not interested in seeing Ortho/spine at this time and will call back if he changes his mind. He is on gabapentin as above        Above conditions discussed at length and patient vocalized understanding.   All questions answered to patient satisfaction

## 2021-01-11 ENCOUNTER — CLINICAL SUPPORT (OUTPATIENT)
Dept: INTERNAL MEDICINE CLINIC | Age: 86
End: 2021-01-11

## 2021-01-11 DIAGNOSIS — Z48.02 VISIT FOR SUTURE REMOVAL: Primary | ICD-10-CM

## 2021-01-11 NOTE — PROGRESS NOTES
2 stitches removed from from finger. Patient was tolerated well. No complaints or concerns at this time.

## 2021-02-16 ENCOUNTER — TELEPHONE (OUTPATIENT)
Dept: INTERNAL MEDICINE CLINIC | Age: 86
End: 2021-02-16

## 2021-02-16 DIAGNOSIS — Z23 ENCOUNTER FOR IMMUNIZATION: Primary | ICD-10-CM

## 2021-02-16 PROCEDURE — 90471 IMMUNIZATION ADMIN: CPT | Performed by: INTERNAL MEDICINE

## 2021-03-03 ENCOUNTER — TELEPHONE (OUTPATIENT)
Dept: INTERNAL MEDICINE CLINIC | Age: 86
End: 2021-03-03

## 2021-03-03 NOTE — TELEPHONE ENCOUNTER
Pt wants to get his Covid vaccine, and his wife Sonia Melgar as well,  he called the location where he was to get it last time and they told him they were there until March 10th??  Said they had to cancel out because wife was running a fever.   Please advise him at [phone]

## 2021-03-18 DIAGNOSIS — Z23 ENCOUNTER FOR IMMUNIZATION: ICD-10-CM

## 2021-03-18 PROCEDURE — 90471 IMMUNIZATION ADMIN: CPT | Performed by: INTERNAL MEDICINE

## 2021-05-20 DIAGNOSIS — M54.16 LUMBAR NEURITIS: ICD-10-CM

## 2021-05-20 DIAGNOSIS — S32.040A CLOSED WEDGE COMPRESSION FRACTURE OF L4 VERTEBRA, INITIAL ENCOUNTER (HCC): ICD-10-CM

## 2021-05-20 DIAGNOSIS — M51.36 DDD (DEGENERATIVE DISC DISEASE), LUMBAR: ICD-10-CM

## 2021-05-20 NOTE — TELEPHONE ENCOUNTER
[facility]  reports [facility] date for Neurontin as 12/17/20 for a 90 d/s. Last Visit: 1/5/21 with MD Marvel Dukes  Next Appointment: 9/30/21 with MD Marvel Dukes  Previous Refill Encounter(s): 12/16/20 #[id number] with 1 refill    Requested Prescriptions     Pending Prescriptions Disp Refills    gabapentin (NEURONTIN) 300 mg capsule 270 Capsule 1     Sig: Take 1 Capsule by mouth three (3) times daily (with meals). Max Daily Amount: 900 mg.  Indications: neuropathic pain

## 2021-05-21 RX ORDER — GABAPENTIN 300 MG/1
300 CAPSULE ORAL
Qty: 270 CAPSULE | Refills: 1 | Status: SHIPPED | OUTPATIENT
Start: 2021-05-21 | End: 2022-09-01

## 2021-06-04 NOTE — TELEPHONE ENCOUNTER
Pt request to speak with Dr Donell Nolan re: pain in his head (right side). Stated originates in his neck. Said Dr Donell Nolan is aware but he can't remember what Dr Donell Nolan called it. Stated it is now happening nightly at bedtime. No

## 2021-06-27 ENCOUNTER — TELEPHONE (OUTPATIENT)
Dept: INTERNAL MEDICINE CLINIC | Age: 86
End: 2021-06-27

## 2021-06-27 DIAGNOSIS — D64.9 ANEMIA, UNSPECIFIED TYPE: Primary | ICD-10-CM

## 2021-06-27 DIAGNOSIS — E78.5 HYPERLIPIDEMIA, UNSPECIFIED HYPERLIPIDEMIA TYPE: ICD-10-CM

## 2021-06-28 NOTE — PROGRESS NOTES
80 y.o. WHITE/NON- male who presents for evaluation. He's been having episodes of 'weakness'. Reports that it's usually 2-3 hours after he eats; he doesn't 'feel right', gets jittery but no associated sweats, blurred vision. Denied actual muscle weakness, incoordination, cranial nerve sx, numbness, paresthesias. Of note, he generally eats a reasonable breakfast but smaller meals at lunch and dinner, sometimes just some bread or fruit or yogurt. He's had 3-4 episodes the last 4 mos or so    No cardiovascular complaints. Trying to stay active by doing yardwork, chores. No gi or gu issues. LAST MEDICARE WELLNESS EXAM: 10/26/15, 9/12/17, 9/18/18, 9/26/19, 9/24/20    Past Medical History:   Diagnosis Date    B12 deficiency     Closed compression fracture of L4 lumbar vertebra     Colon polyps     Dr Milo Tate    Degenerative arthritis of cervical spine 12/2020    xray showed mod/sev discogenic degen changes C5-T1, asymmetric L facet hypertrophy C3-5    Degenerative arthritis of lumbar spine     MRI 11/14 w multilevel disease; chronic lbp w sciatica saw Dr Clemencia Jarrett    Distal radial fracture, right wrist 02/2015    Fell on ice. s/p ORIF with right dorsal endoplate nail, two proximal screws, and four distal pegs. Dr. Gia Velez.     Diverticulosis     on CT 4/16    Erectile dysfunction     Nephrolithiasis 04/2016    right tiny stone on CT; microhematuria    Osteoporosis     ibandronate 2009-5/13 stopped due to tooth issues per his endodontist;  DEXA t score -2.4 spibe, -2.1 hip (8/11); -2.3 spine, -1.9 hip (8/13); -2.4 spine, -2.4 hip (9/15)    Proctalgia fugax     RBBB (right bundle branch block)     Right inguinal hernia     Dr Sheryle Lacy Tinnitus      Past Surgical History:   Procedure Laterality Date    HX CATARACT REMOVAL      HX COLONOSCOPY      Dr Milo Tate 2012 negative    HX HERNIA REPAIR  2002    [facility] repair     Social History     Socioeconomic History    Marital status:  Spouse name: Not on file    Number of children: Not on file    Years of education: Not on file    Highest education level: Not on file   Occupational History    Not on file   Tobacco Use    Smoking status: Never Smoker    Smokeless tobacco: Never Used   Substance and Sexual Activity    Alcohol use: No    Drug use: No    Sexual activity: Not Currently   Other Topics Concern    Not on file   Social History Narrative    Not on file     Social Determinants of Health     Financial Resource Strain:     Difficulty of Paying Living Expenses:    Food Insecurity:     Worried About Running Out of Food in the Last Year:     [address] in the Last Year:    Transportation Needs:     Lack of Transportation (Medical):  Lack of Transportation (Non-Medical):    Physical Activity:     Days of Exercise per Week:     Minutes of Exercise per Session:    Stress:     Feeling of Stress :    Social Connections:     Frequency of Communication with Friends and Family:     Frequency of Social Gatherings with Friends and Family:     Attends [employer] Services:     Active Member of Clubs or Organizations:     Attends Club or Organization Meetings:     Marital Status:    Intimate Partner Violence:     Fear of Current or Ex-Partner:     Emotionally Abused:     Physically Abused:     Sexually Abused:      No family history on file. Current Outpatient Medications   Medication Sig    vitamin S-E-R-lutein-minerals (OCUVITE) tablet 1 Tablet daily.  gabapentin (NEURONTIN) 300 mg capsule Take 1 Capsule by mouth three (3) times daily (with meals). Max Daily Amount: 900 mg. Indications: neuropathic pain    meclizine (ANTIVERT) 25 mg tablet Take 1 Tab by mouth three (3) times daily as needed for Dizziness.  ibuprofen (ADVIL) 200 mg tablet Take 400 mg by mouth every eight (8) hours as needed for Pain.  GLUC/CHND/OM3/DHA/EPA/FISH/STR (GLUCOSAMINE CHONDROITIN PLUS PO) Take  by mouth.     cyanocobalamin (VITAMIN B-12) 1,000 mcg tablet Take 1,000 mcg by mouth daily.  Cholecalciferol, Vitamin D3, (VITAMIN D) 1,000 unit Cap Take 2,000 Units by mouth.  cyclobenzaprine (FLEXERIL) 10 mg tablet Take 1 Tab by mouth three (3) times daily as needed for Muscle Spasm(s). (Patient not taking: Reported on 7/7/2021)    TURMERIC PO Take  by mouth. (Patient not taking: Reported on 7/7/2021)    omega-3 fatty acids-vitamin e (FISH OIL) 1,000 mg cap Take 1 Cap by mouth. (Patient not taking: Reported on 7/7/2021)     No current facility-administered medications for this visit. Allergies   Allergen Reactions    Sulfa (Sulfonamide Antibiotics) Nausea Only    Tramadol Nausea and Vomiting     REVIEW OF SYSTEMS: colo Dr Diallo Hamilton 2012  Ophtho  no vision change or eye pain  Oral  no mouth pain, tongue or tooth problems  Ears  no hearing loss, ear pain, fullness, no swallowing problems  Cardiac  no CP, PND, orthopnea, edema, palpitations or syncope  Chest  no breast masses  Resp  no wheezing, chronic coughing, dyspnea  GI  no heartburn, nausea, vomiting, change in bowel habits, bleeding, hemorrhoids  Urinary  no dysuria, hematuria, flank pain, urgency, frequency    Visit Vitals  /61   Pulse 62   Temp 97.2 °F (36.2 °C) (Temporal)   Resp 12   Ht 5' 11\" (1.803 m)   Wt 172 lb (78 kg)   SpO2 98%   BMI 23.99 kg/m²      Affect is appropriate. Mood stable  No apparent distress  HEENT --Anicteric sclerae, tympanic membranes normal,  ear canals normal.  PERRL, EOMI, conjunctiva and lids normal. No thyromegaly, JVD, or bruits. Lungs --Clear to auscultation, normal percussion. Heart --Regular rate and rhythm, no murmurs, rubs, gallops, or clicks. Chest wall --Nontender to palpation. PMI normal.  Abdomen -- Soft and nontender, no hepatosplenomegaly or masses. Extremities -- Without cyanosis, clubbing, edema. 2+ pulses equally and bilaterally.     LABS  From 9/14 showed gluc 90,   cr 1.43, gfr 47,  alt<5,  chol 149, tg 56, hdl 59, ldl-c 79, wbc 5.7, hb 13.2, plt 170, ua tr bl, vit d 46.4  From 2/15 showed gluc 114, cr 1.06, gfr>60, alt 17,        wbc 6.9, hb 12.9, plt 187  From 9/15 showed gluc 89,   cr 0.93, gfr 75,  alt 13, chol 165, tg 92, hdl 66, ldl-c 81, wbc 5.8, hb 14.0, plt 198, ua tr bl, vit d 47.3,           b12 486  From 8/16 showed gluc 148, cr 1.16, gfr 60,  alt 20,                       ck/trop neg  From 8/16 showed gluc 80,   cr 0.97, gfr>60, alt 28, chol 118, tg 71, hdl 46, ldl-c 58, wbc 5.7, hb 12.5, plt 341, ua tr bl, vit d 40.9, tsh 1.04  From 9/17 showed gluc 89,   cr 1.01, gfr>60, alt 20, chol 152, tg 76, hdl 74, ldl-c 63, wbc 5.5, hb 13.7, plt 195, ua neg, vit d 52.0, tsh 1.32  From 9/18 showed gluc 83,   cr 0.87, gfr>60, alt 20,        wbc 5.1, hb 13.2, plt 192,       tsh 1.40, ft4 0.90, b12 537, fol>20  From 9/19 showed gluc 84,   cr 0.86, gfr>60, alt 22,        wbc 4.7, hb 12.6, plt 188  From 9/20 showed gluc 88,   cr 0.96, gfr>60, alt 19,        wbc 5.2, hb 12.8, plt 195, fe 83, %sat 30, ferritin 107, b12 500 fol>20, spep neg    We reviewed the patient's labs from the last several visits to point out trends in the numbers        Patient Active Problem List   Diagnosis Code    Impotence of organic origin N52.9    Right bundle branch block I45.10    Colon polyp K63.5    Osteoporosis s/p compression fx L4, fx wrist  M81.0    Nephrolithiasis N20.0    Inguinal hernia, right Dr Selin Rust K40.90    Low back pain with left-sided sciatica M54.42    Advance directive in chart Z78.9     Assessment and plan:  1. Colon polyp. Fiber  2. Osteoporosis. Ca/d, weight bearing exercise. 3. Nephrolithiasis. Hydration  4. RIH. Observation, saw Dr Selin Rust previously  5. Spine. Per Dr Claudeen Grange  6. R/o hypoglycemia. Will sched 5hr GTT and go form there       RTC 9/21    Above conditions discussed at length and patient vocalized understanding.   All questions answered to patient satisfaction

## 2021-07-02 NOTE — PROGRESS NOTES
Yadi Rollins presents today for   Chief Complaint   Patient presents with    Extremity Weakness     X 10 years patient reports episodes of shakiness and weakness in arms and legs,  relieved by eating    Pain (Chronic)     neck and head pain X 2 years, states it comes and goes            Coordination of Care:  1. Have you been to the ER, urgent care clinic since your last visit? Hospitalized since your last visit? NO    2. Have you seen or consulted any other health care providers outside of the [address] [place] [address] since your last visit? Include any pap smears or colon screening.  YES

## 2021-07-07 ENCOUNTER — OFFICE VISIT (OUTPATIENT)
Dept: INTERNAL MEDICINE CLINIC | Age: 86
End: 2021-07-07
Payer: MEDICARE

## 2021-07-07 VITALS
WEIGHT: 172 LBS | HEIGHT: 71 IN | DIASTOLIC BLOOD PRESSURE: 61 MMHG | RESPIRATION RATE: 12 BRPM | HEART RATE: 62 BPM | BODY MASS INDEX: 24.08 KG/M2 | SYSTOLIC BLOOD PRESSURE: 108 MMHG | OXYGEN SATURATION: 98 % | TEMPERATURE: 97.2 F

## 2021-07-07 DIAGNOSIS — E16.2 HYPOGLYCEMIA: Primary | ICD-10-CM

## 2021-07-07 PROCEDURE — G8427 DOCREV CUR MEDS BY ELIG CLIN: HCPCS | Performed by: INTERNAL MEDICINE

## 2021-07-07 PROCEDURE — 1101F PT FALLS ASSESS-DOCD LE1/YR: CPT | Performed by: INTERNAL MEDICINE

## 2021-07-07 PROCEDURE — G8420 CALC BMI NORM PARAMETERS: HCPCS | Performed by: INTERNAL MEDICINE

## 2021-07-07 PROCEDURE — G8536 NO DOC ELDER MAL SCRN: HCPCS | Performed by: INTERNAL MEDICINE

## 2021-07-07 PROCEDURE — G8510 SCR DEP NEG, NO PLAN REQD: HCPCS | Performed by: INTERNAL MEDICINE

## 2021-07-07 PROCEDURE — 99213 OFFICE O/P EST LOW 20 MIN: CPT | Performed by: INTERNAL MEDICINE

## 2021-07-09 ENCOUNTER — TELEPHONE (OUTPATIENT)
Dept: INTERNAL MEDICINE CLINIC | Age: 86
End: 2021-07-09

## 2021-07-09 DIAGNOSIS — E16.2 HYPOGLYCEMIA: ICD-10-CM

## 2021-07-09 DIAGNOSIS — E16.1 REACTIVE HYPOGLYCEMIA: Primary | ICD-10-CM

## 2021-07-14 ENCOUNTER — TELEPHONE (OUTPATIENT)
Dept: INTERNAL MEDICINE CLINIC | Age: 86
End: 2021-07-14

## 2021-07-14 DIAGNOSIS — R53.83 FATIGUE, UNSPECIFIED TYPE: ICD-10-CM

## 2021-07-14 DIAGNOSIS — N32.81 OAB (OVERACTIVE BLADDER): ICD-10-CM

## 2021-07-14 DIAGNOSIS — M25.50 ARTHRALGIA, UNSPECIFIED JOINT: ICD-10-CM

## 2021-07-14 NOTE — TELEPHONE ENCOUNTER
Pt states he called central scheduling to get MRI Neck done and they told him no order in his chart. Pt states RD was supposed to order it.   Please advise him at [phone]

## 2021-07-15 NOTE — TELEPHONE ENCOUNTER
No, my note makes no mention of mri c spine being planned  What sx is he having that will warrant it again?

## 2021-07-16 NOTE — TELEPHONE ENCOUNTER
Patient states has head and neck pain intermittently, he also wanted to reiterate that he does not wish to consult with a nutritionist.

## 2021-07-19 NOTE — TELEPHONE ENCOUNTER
He has known c spine disease on prior xray  Would not do mri unless sx worsening/intractable pain or has onset of significant new sx like arm pain/weakness, etc

## 2021-07-21 NOTE — TELEPHONE ENCOUNTER
After speaking with Rita Good at [facility] lab ph [phone]    appt needs to be changed to Tues 07/27/21 at 7 AM.    Spoke with pt and he is aware and agrees to appt change.
Called [facility] [phone] to schedule 5 hour GTT, but they stated I was previously misinformed, and they do not perform 5 hour GTT, only 1-3 hour.   Will call around to other labs for one that performs this test.
Called [facility] to schedule 5 hour GTT, was told I was again misinformed and they did not perform the test, will call other labs
Eleanor, they stated they will be able to perform the 5 hour GTT as long as patient has an order for the test.
Ordered as gestational as unable to order otherwise
Patient is refusing to see nutritionist at this time.
Patient scheduled for 7-23-21 at 7 am at [facility] outpatient lab, patient called to inform of the appointment, given instructions to have nothing to eat or drink after 10 pm the night prior, patient verbalized understanding.
Patient's wife Angel Griselda) reached and given information to give more frequent small meals (not bolus of sweets). Lab located that will do the 5 hour GTT, but they stated to call back Monday to make the appointment ([phone]). Will call lab Monday to schedule the patient. Patient's wife verbalized understanding.
Patients wife calling stating that her husbands blood sugar has been abnormally low. 84, 65, 91    She stated he was shaky when it was 72    She stated she gave him some cookies and pie to help raise the sugar. She wants to know if she should continue to give him sweets?  Please advise
Unable to located a lab that will perform a 5 hour GTT, can we have an order for a 3 hour GTT?
We're trying to get him scheduled for 5h Glucose tolerance test to r/o hypoglycemia    Treatment for hypoglycemia is frequent small MEALS - NOT boluses of sweets/high carbs - that will worsen the sx    Would suggest just going to see nutritionist for further guidance - scheduled
all other ROS negative except as per HPI

## 2021-07-27 ENCOUNTER — HOSPITAL ENCOUNTER (OUTPATIENT)
Dept: LAB | Age: 86
Discharge: HOME | End: 2021-07-27
Payer: MEDICARE

## 2021-07-27 DIAGNOSIS — E16.2 HYPOGLYCEMIA: ICD-10-CM

## 2021-07-27 DIAGNOSIS — E16.1 REACTIVE HYPOGLYCEMIA: ICD-10-CM

## 2021-07-27 LAB
GLUCOSE P FAST SERPL-MCNC: 94 MG/DL (ref 74–106)
GLUCOSE TOLERANCE,GTA: NORMAL
GLUCOSE, 1 HR,GTT1: 139 MG/DL (ref 85–160)
GLUCOSE, 2 HR,GTT2: 93 MG/DL (ref 70–125)
GLUCOSE, 3 HR,GTT3: 93 MG/DL (ref 65–110)

## 2021-07-27 PROCEDURE — 36415 COLL VENOUS BLD VENIPUNCTURE: CPT

## 2021-07-27 PROCEDURE — 82951 GLUCOSE TOLERANCE TEST (GTT): CPT

## 2021-07-28 ENCOUNTER — TELEPHONE (OUTPATIENT)
Dept: INTERNAL MEDICINE CLINIC | Age: 86
End: 2021-07-28

## 2021-07-28 NOTE — TELEPHONE ENCOUNTER
pls call    Results for orders placed or performed during the hospital encounter of 07/27/21   GLUCOSE TOLERANCE 3 HR   Result Value Ref Range    GLUCOSE TOLERANCE          GLUCOSE, FASTING 94 74 - 106 MG/DL    GLUCOSE, 1  85 - 160 MG/DL    GLUCOSE, 2 HR 93 70 - 125 MG/DL    GLUCOSE, 3 HR 93 65 - 110 MG/DL     Did he have any sx?

## 2021-07-29 NOTE — TELEPHONE ENCOUNTER
Patient reached, given results, verbalized understanding. Stated since he has started eating more small meals daily he has not had any more symptoms.

## 2021-09-23 ENCOUNTER — APPOINTMENT (OUTPATIENT)
Dept: INTERNAL MEDICINE CLINIC | Age: 86
End: 2021-09-23

## 2021-09-23 ENCOUNTER — HOSPITAL ENCOUNTER (OUTPATIENT)
Dept: LAB | Age: 86
Discharge: HOME | End: 2021-09-23
Payer: MEDICARE

## 2021-09-23 DIAGNOSIS — E78.5 HYPERLIPIDEMIA, UNSPECIFIED HYPERLIPIDEMIA TYPE: ICD-10-CM

## 2021-09-23 DIAGNOSIS — E16.2 HYPOGLYCEMIA: ICD-10-CM

## 2021-09-23 DIAGNOSIS — D64.9 ANEMIA, UNSPECIFIED TYPE: ICD-10-CM

## 2021-09-23 LAB
ALBUMIN SERPL-MCNC: 3.3 G/DL (ref 3.4–5)
ALBUMIN/GLOB SERPL: 1.1 {RATIO} (ref 0.8–1.7)
ALP SERPL-CCNC: 63 U/L (ref 45–117)
ALT SERPL-CCNC: 17 U/L (ref 16–61)
ANION GAP SERPL CALC-SCNC: 4 MMOL/L (ref 3–18)
AST SERPL-CCNC: 18 U/L (ref 10–38)
BILIRUB SERPL-MCNC: 0.7 MG/DL (ref 0.2–1)
BUN SERPL-MCNC: 15 MG/DL (ref 7–18)
BUN/CREAT SERPL: 16 (ref 12–20)
CALCIUM SERPL-MCNC: 8.8 MG/DL (ref 8.5–10.1)
CHLORIDE SERPL-SCNC: 110 MMOL/L (ref 100–111)
CHOLEST SERPL-MCNC: 154 MG/DL
CO2 SERPL-SCNC: 29 MMOL/L (ref 21–32)
CREAT SERPL-MCNC: 0.94 MG/DL (ref 0.6–1.3)
ERYTHROCYTE [DISTWIDTH] IN BLOOD BY AUTOMATED COUNT: 13.8 % (ref 11.6–14.5)
GLOBULIN SER CALC-MCNC: 2.9 G/DL (ref 2–4)
GLUCOSE SERPL-MCNC: 86 MG/DL (ref 74–99)
HCT VFR BLD AUTO: 38.5 % (ref 36–48)
HDLC SERPL-MCNC: 68 MG/DL (ref 40–60)
HDLC SERPL: 2.3 {RATIO} (ref 0–5)
HGB BLD-MCNC: 12.6 G/DL (ref 13–16)
LDLC SERPL CALC-MCNC: 72.2 MG/DL (ref 0–100)
LIPID PROFILE,FLP: ABNORMAL
MCH RBC QN AUTO: 30.9 PG (ref 24–34)
MCHC RBC AUTO-ENTMCNC: 32.7 G/DL (ref 31–37)
MCV RBC AUTO: 94.4 FL (ref 78–100)
PLATELET # BLD AUTO: 188 K/UL (ref 135–420)
PMV BLD AUTO: 10.1 FL (ref 9.2–11.8)
POTASSIUM SERPL-SCNC: 4.2 MMOL/L (ref 3.5–5.5)
PROT SERPL-MCNC: 6.2 G/DL (ref 6.4–8.2)
RBC # BLD AUTO: 4.08 M/UL (ref 4.35–5.65)
SODIUM SERPL-SCNC: 143 MMOL/L (ref 136–145)
TRIGL SERPL-MCNC: 69 MG/DL (ref ?–150)
VLDLC SERPL CALC-MCNC: 13.8 MG/DL
WBC # BLD AUTO: 5.3 K/UL (ref 4.6–13.2)

## 2021-09-23 PROCEDURE — 80061 LIPID PANEL: CPT

## 2021-09-23 PROCEDURE — 80053 COMPREHEN METABOLIC PANEL: CPT

## 2021-09-23 PROCEDURE — 85027 COMPLETE CBC AUTOMATED: CPT

## 2021-09-23 PROCEDURE — 36415 COLL VENOUS BLD VENIPUNCTURE: CPT

## 2021-09-27 NOTE — PROGRESS NOTES
This is a Subsequent Medicare Annual Wellness Exam     I have reviewed the patient's medical history in detail and updated the computerized patient record. Assessment/Plan   Education and counseling provided:  Are appropriate based on today's review and evaluation  Influenza Vaccine  Cardiovascular screening blood test  Diabetes screening test    1. Medicare annual wellness visit, subsequent       Depression Risk Factor Screening     3 most recent PHQ Screens 9/30/2021   Little interest or pleasure in doing things Not at all   Feeling down, depressed, irritable, or hopeless Not at all   Total Score PHQ 2 0       Alcohol Risk Screen    Do you average more than 1 drink per night or more than 7 drinks a week: No    In the past three months have you have had more than 4 drinks containing alcohol on one occasion: No        Functional Ability and Level of Safety    Hearing: Hearing is good. Activities of Daily Living: The home contains: no safety equipment. Patient does total self care      Ambulation: with no difficulty     Fall Risk:  Fall Risk Assessment, last 12 mths 9/30/2021   Able to walk? Yes   Fall in past 12 months? 0   Do you feel unsteady? 0   Are you worried about falling 0   Is TUG test greater than 12 seconds? -   Is the gait abnormal? -   Number of falls in past 12 months -   Fall with injury?  -      Abuse Screen:  Patient is not abused       Cognitive Screening    Has your family/caregiver stated any concerns about your memory: no     Cognitive Screening: Normal - Mini Cog Test    Health Maintenance Due     Health Maintenance Due   Topic Date Due    Shingrix Vaccine Age 50> (1 of 2) Never done    Flu Vaccine (1) 09/01/2021       Patient Care Team   Patient Care Team:  Lisseth Coleman MD as PCP - General (Internal Medicine)  Lisseth Coleman MD as PCP - Ross Rogel Provider    History     Patient Active Problem List   Diagnosis Code    Impotence of organic origin N52.9    Right bundle branch block I45.10    Colon polyp K63.5    Osteoporosis s/p compression fx L4, fx wrist  M81.0    Nephrolithiasis N20.0    Inguinal hernia, right Dr Rojean Felty K40.90    Low back pain with left-sided sciatica M54.42    Advance directive in chart Z78.9     Past Medical History:   Diagnosis Date    B12 deficiency     Closed compression fracture of L4 lumbar vertebra     Colon polyps     Dr Chey James    Degenerative arthritis of cervical spine 12/2020    xray showed mod/sev discogenic degen changes C5-T1, asymmetric L facet hypertrophy C3-5    Degenerative arthritis of lumbar spine     MRI 11/14 w multilevel disease; chronic lbp w sciatica saw Dr Danyelle Avila    Distal radial fracture, right wrist 02/2015    Fell on ice. s/p ORIF with right dorsal endoplate nail, two proximal screws, and four distal pegs. Dr. Aide Medina.  Diverticulosis     on CT 4/16    Erectile dysfunction     Nephrolithiasis 04/2016    right tiny stone on CT; microhematuria    Osteoporosis     ibandronate 2009-5/13 stopped due to tooth issues per his endodontist;  DEXA t score -2.4 spibe, -2.1 hip (8/11); -2.3 spine, -1.9 hip (8/13); -2.4 spine, -2.4 hip (9/15)    Proctalgia fugax     RBBB (right bundle branch block)     Right inguinal hernia     Dr Scott Hayes Tinnitus       Past Surgical History:   Procedure Laterality Date    HX CATARACT REMOVAL      HX COLONOSCOPY      Dr Chey James 2012 negative    HX HERNIA REPAIR  2002    [facility] repair     Current Outpatient Medications   Medication Sig Dispense Refill    vitamin E-V-N-lutein-minerals (OCUVITE) tablet 1 Tablet daily.  gabapentin (NEURONTIN) 300 mg capsule Take 1 Capsule by mouth three (3) times daily (with meals). Max Daily Amount: 900 mg. Indications: neuropathic pain 270 Capsule 1    ibuprofen (ADVIL) 200 mg tablet Take 400 mg by mouth every eight (8) hours as needed for Pain.  GLUC/CHND/OM3/DHA/EPA/FISH/STR (GLUCOSAMINE CHONDROITIN PLUS PO) Take  by mouth.       cyanocobalamin (VITAMIN B-12) 1,000 mcg tablet Take 1,000 mcg by mouth daily.  Cholecalciferol, Vitamin D3, (VITAMIN D) 1,000 unit Cap Take 2,000 Units by mouth. Allergies   Allergen Reactions    Sulfa (Sulfonamide Antibiotics) Nausea Only    Tramadol Nausea and Vomiting       No family history on file.   Social History     Tobacco Use    Smoking status: Never Smoker    Smokeless tobacco: Never Used   Substance Use Topics    Alcohol use: No         Jack Lacy MD

## 2021-09-27 NOTE — PROGRESS NOTES
80 y.o. WHITE/NON- male who presents for evaluation. His previous sx of postprandial weakness resolved without explanation, he feels fine now. GTT was unremarkable for r/o hypoglycemia    No cardiovascular complaints. Trying to stay active by doing yardwork, chores although his wife has been needing a lot of help with ehr active medical issues so not as much time for himself. Also having some issues with the right foot    No gi or gu issues although he did have transient right sided abd discomfort about a week ago which resolved by itself as well. CT results from 4/16 reviewed    LAST MEDICARE WELLNESS EXAM: 10/26/15, 9/12/17, 9/18/18, 9/26/19, 9/24/20, 9/30/21    Past Medical History:   Diagnosis Date    B12 deficiency     Closed compression fracture of L4 lumbar vertebra     Colon polyps     Dr Shabnam Mckeon    Degenerative arthritis of cervical spine 12/2020    xray showed mod/sev discogenic degen changes C5-T1, asymmetric L facet hypertrophy C3-5    Degenerative arthritis of lumbar spine     MRI 11/14 w multilevel disease; chronic lbp w sciatica saw Dr Thais Jean    Distal radial fracture, right wrist 02/2015    Fell on ice. s/p ORIF with right dorsal endoplate nail, two proximal screws, and four distal pegs. Dr. Anne-Marie Ling.     Diverticulosis     on CT 4/16    Erectile dysfunction     Nephrolithiasis 04/2016    right tiny stone on CT; microhematuria    Osteoporosis     ibandronate 2009-5/13 stopped due to tooth issues per his endodontist;  DEXA t score -2.4 spibe, -2.1 hip (8/11); -2.3 spine, -1.9 hip (8/13); -2.4 spine, -2.4 hip (9/15)    Proctalgia fugax     RBBB (right bundle branch block)     Right inguinal hernia     Dr Erick Liang Tinnitus      Past Surgical History:   Procedure Laterality Date    HX CATARACT REMOVAL      HX COLONOSCOPY      Dr Shabnam Mckeon 2012 negative    [facility]IA REPAIR  2002    [facility]     Social History     Socioeconomic History    Marital status:  Spouse name: Not on file    Number of children: Not on file    Years of education: Not on file    Highest education level: Not on file   Occupational History    Not on file   Tobacco Use    Smoking status: Never Smoker    Smokeless tobacco: Never Used   Substance and Sexual Activity    Alcohol use: No    Drug use: No    Sexual activity: Not Currently   Other Topics Concern    Not on file   Social History Narrative    Not on file     Social Determinants of Health     Financial Resource Strain:     Difficulty of Paying Living Expenses:    Food Insecurity:     Worried About Running Out of Food in the Last Year:     [address] in the Last Year:    Transportation Needs:     Lack of Transportation (Medical):  Lack of Transportation (Non-Medical):    Physical Activity:     Days of Exercise per Week:     Minutes of Exercise per Session:    Stress:     Feeling of Stress :    Social Connections:     Frequency of Communication with Friends and Family:     Frequency of Social Gatherings with Friends and Family:     Attends [employer] Services:     Active Member of Clubs or Organizations:     Attends Club or Organization Meetings:     Marital Status:    Intimate Partner Violence:     Fear of Current or Ex-Partner:     Emotionally Abused:     Physically Abused:     Sexually Abused:      No family history on file. Current Outpatient Medications   Medication Sig    vitamin T-S-H-lutein-minerals (OCUVITE) tablet 1 Tablet daily.  gabapentin (NEURONTIN) 300 mg capsule Take 1 Capsule by mouth three (3) times daily (with meals). Max Daily Amount: 900 mg. Indications: neuropathic pain    ibuprofen (ADVIL) 200 mg tablet Take 400 mg by mouth every eight (8) hours as needed for Pain.  GLUC/CHND/OM3/DHA/EPA/FISH/STR (GLUCOSAMINE CHONDROITIN PLUS PO) Take  by mouth.  cyanocobalamin (VITAMIN B-12) 1,000 mcg tablet Take 1,000 mcg by mouth daily.       Cholecalciferol, Vitamin D3, (VITAMIN D) 1,000 unit Cap Take 2,000 Units by mouth. No current facility-administered medications for this visit. Allergies   Allergen Reactions    Sulfa (Sulfonamide Antibiotics) Nausea Only    Tramadol Nausea and Vomiting     REVIEW OF SYSTEMS: colo Dr Ayo Cedeno 2012  Ophtho  no vision change or eye pain  Oral  no mouth pain, tongue or tooth problems  Ears  no hearing loss, ear pain, fullness, no swallowing problems  Cardiac  no CP, PND, orthopnea, edema, palpitations or syncope  Chest  no breast masses  Resp  no wheezing, chronic coughing, dyspnea  GI  no heartburn, nausea, vomiting, change in bowel habits, bleeding, hemorrhoids  Urinary  no dysuria, hematuria, flank pain, urgency, frequency    Visit Vitals  /79   Pulse 60   Temp 97.2 °F (36.2 °C) (Temporal)   Resp 16   Ht 5' 11\" (1.803 m)   Wt 172 lb (78 kg)   SpO2 96%   BMI 23.99 kg/m²      Affect is appropriate. Mood stable  No apparent distress  HEENT --Anicteric sclerae, tympanic membranes normal,  ear canals normal.  PERRL, EOMI, conjunctiva and lids normal. No thyromegaly, JVD, or bruits. Lungs --Clear to auscultation, normal percussion. Heart --Regular rate and rhythm, no murmurs, rubs, gallops, or clicks. Chest wall --Nontender to palpation. PMI normal.  Abdomen -- Soft and nontender, no hepatosplenomegaly or masses. Extremities -- Without cyanosis, clubbing, edema. 2+ pulses equally and bilaterally.     LABS  From 9/14 showed gluc 90,   cr 1.43, gfr 47,  alt<5,  chol 149, tg 56, hdl 59, ldl-c 79, wbc 5.7, hb 13.2, plt 170, ua tr bl, vit d 46.4  From 2/15 showed gluc 114, cr 1.06, gfr>60, alt 17,        wbc 6.9, hb 12.9, plt 187  From 9/15 showed gluc 89,   cr 0.93, gfr 75,  alt 13, chol 165, tg 92, hdl 66, ldl-c 81, wbc 5.8, hb 14.0, plt 198, ua tr bl, vit d 47.3,           b12 486  From 8/16 showed gluc 148, cr 1.16, gfr 60,  alt 20,                       ck/trop neg  From 8/16 showed gluc 80,   cr 0.97, gfr>60, alt 28, chol 118, tg 71, hdl 46, ldl-c 58, wbc 5.7, hb 12.5, plt 341, ua tr bl, vit d 40.9, tsh 1.04  From 9/17 showed gluc 89,   cr 1.01, gfr>60, alt 20, chol 152, tg 76, hdl 74, ldl-c 63, wbc 5.5, hb 13.7, plt 195, ua neg, vit d 52.0, tsh 1.32  From 9/18 showed gluc 83,   cr 0.87, gfr>60, alt 20,        wbc 5.1, hb 13.2, plt 192,       tsh 1.40, ft4 0.90, b12 537, fol>20  From 9/19 showed gluc 84,   cr 0.86, gfr>60, alt 22,        wbc 4.7, hb 12.6, plt 188  From 9/20 showed gluc 88,   cr 0.96, gfr>60, alt 19,        wbc 5.2, hb 12.8, plt 195, fe 83, %sat 30, ferritin 107, b12 500 fol>20, spep neg    Results for orders placed or performed during the hospital encounter of 09/23/21   CBC W/O DIFF   Result Value Ref Range    WBC 5.3 4.6 - 13.2 K/uL    RBC 4.08 (L) 4.35 - 5.65 M/uL    HGB 12.6 (L) 13.0 - 16.0 g/dL    HCT 38.5 36.0 - 48.0 %    MCV 94.4 78.0 - 100.0 FL    MCH 30.9 24.0 - 34.0 PG    MCHC 32.7 31.0 - 37.0 g/dL    RDW 13.8 11.6 - 14.5 %    PLATELET 352 639 - 685 K/uL    MPV 10.1 9.2 - 04.9 FL   METABOLIC PANEL, COMPREHENSIVE   Result Value Ref Range    Sodium 143 136 - 145 mmol/L    Potassium 4.2 3.5 - 5.5 mmol/L    Chloride 110 100 - 111 mmol/L    CO2 29 21 - 32 mmol/L    Anion gap 4 3.0 - 18 mmol/L    Glucose 86 74 - 99 mg/dL    BUN 15 7.0 - 18 MG/DL    Creatinine 0.94 0.6 - 1.3 MG/DL    BUN/Creatinine ratio 16 12 - 20      GFR est AA >60 >60 ml/min/1.73m2    GFR est non-AA >60 >60 ml/min/1.73m2    Calcium 8.8 8.5 - 10.1 MG/DL    Bilirubin, total 0.7 0.2 - 1.0 MG/DL    ALT (SGPT) 17 16 - 61 U/L    AST (SGOT) 18 10 - 38 U/L    Alk.  phosphatase 63 45 - 117 U/L    Protein, total 6.2 (L) 6.4 - 8.2 g/dL    Albumin 3.3 (L) 3.4 - 5.0 g/dL    Globulin 2.9 2.0 - 4.0 g/dL    A-G Ratio 1.1 0.8 - 1.7     LIPID PANEL   Result Value Ref Range    LIPID PROFILE          Cholesterol, total 154 <200 MG/DL    Triglyceride 69 <150 MG/DL    HDL Cholesterol 68 (H) 40 - 60 MG/DL    LDL, calculated 72.2 0 - 100 MG/DL    VLDL, calculated 13.8 MG/DL CHOL/HDL Ratio 2.3 0 - 5.0         We reviewed the patient's labs from the last several visits to point out trends in the numbers        Patient Active Problem List   Diagnosis Code    Impotence of organic origin N52.9    Right bundle branch block I45.10    Colon polyp K63.5    Osteoporosis s/p compression fx L4, fx wrist  M81.0    Nephrolithiasis N20.0    Inguinal hernia, right Dr Neha Rocha K40.90    Low back pain with left-sided sciatica M54.42    Advance directive in chart Z78.9     Assessment and plan:  1. Colon polyp. Fiber  2. Osteoporosis. Ca/d, weight bearing exercise. 3. Nephrolithiasis. Hydration  4. RIH. Observation, saw Dr Neha Rocha previously  5. Spine. Per Dr Delta Tam  6. Transient and pain. Observation and call if recurring      RTC 3/22    Above conditions discussed at length and patient vocalized understanding. All questions answered to patient satisfaction        ICD-10-CM ICD-9-CM    1. Medicare annual wellness visit, subsequent  Z00.00 V70.0    2. Hyperplastic colonic polyp, unspecified part of colon  K63.5 211.3    3.  Age-related osteoporosis without current pathological fracture  M81.0 733.01

## 2021-09-30 ENCOUNTER — OFFICE VISIT (OUTPATIENT)
Dept: INTERNAL MEDICINE CLINIC | Age: 86
End: 2021-09-30
Payer: MEDICARE

## 2021-09-30 VITALS
OXYGEN SATURATION: 96 % | HEIGHT: 71 IN | WEIGHT: 172 LBS | TEMPERATURE: 97.2 F | SYSTOLIC BLOOD PRESSURE: 134 MMHG | DIASTOLIC BLOOD PRESSURE: 79 MMHG | RESPIRATION RATE: 16 BRPM | BODY MASS INDEX: 24.08 KG/M2 | HEART RATE: 60 BPM

## 2021-09-30 DIAGNOSIS — Z00.00 MEDICARE ANNUAL WELLNESS VISIT, SUBSEQUENT: Primary | ICD-10-CM

## 2021-09-30 DIAGNOSIS — M81.0 AGE-RELATED OSTEOPOROSIS WITHOUT CURRENT PATHOLOGICAL FRACTURE: ICD-10-CM

## 2021-09-30 DIAGNOSIS — K63.5 HYPERPLASTIC COLONIC POLYP, UNSPECIFIED PART OF COLON: ICD-10-CM

## 2021-09-30 PROCEDURE — G8420 CALC BMI NORM PARAMETERS: HCPCS | Performed by: INTERNAL MEDICINE

## 2021-09-30 PROCEDURE — G8510 SCR DEP NEG, NO PLAN REQD: HCPCS | Performed by: INTERNAL MEDICINE

## 2021-09-30 PROCEDURE — G0439 PPPS, SUBSEQ VISIT: HCPCS | Performed by: INTERNAL MEDICINE

## 2021-09-30 PROCEDURE — 99214 OFFICE O/P EST MOD 30 MIN: CPT | Performed by: INTERNAL MEDICINE

## 2021-09-30 PROCEDURE — G8427 DOCREV CUR MEDS BY ELIG CLIN: HCPCS | Performed by: INTERNAL MEDICINE

## 2021-09-30 PROCEDURE — G8536 NO DOC ELDER MAL SCRN: HCPCS | Performed by: INTERNAL MEDICINE

## 2021-09-30 PROCEDURE — 1101F PT FALLS ASSESS-DOCD LE1/YR: CPT | Performed by: INTERNAL MEDICINE

## 2021-09-30 NOTE — PROGRESS NOTES
Hemalatha Aguirre presents today for   Chief Complaint   Patient presents with   [employer] Visit    Labs     9-23-21         Coordination of Care:  1. Have you been to the ER, urgent care clinic since your last visit? Hospitalized since your last visit? NO    2. Have you seen or consulted any other health care providers outside of the [address] [place] [address] since your last visit? Include any pap smears or colon screening.  NO

## 2021-10-04 NOTE — PATIENT INSTRUCTIONS
Medicare Wellness Visit, Male    The best way to live healthy is to have a lifestyle where you eat a well-balanced diet, exercise regularly, limit alcohol use, and quit all forms of tobacco/nicotine, if applicable. Regular preventive services are another way to keep healthy. Preventive services (vaccines, screening tests, monitoring & exams) can help personalize your care plan, which helps you manage your own care. Screening tests can find health problems at the earliest stages, when they are easiest to treat. [facility] follows the current, evidence-based guidelines published by the [facility] ([facility]) when recommending preventive services for our patients. Because we follow these guidelines, sometimes recommendations change over time as research supports it. (For example, a prostate screening blood test is no longer routinely recommended for men with no symptoms). Of course, you and your doctor may decide to screen more often for some diseases, based on your risk and co-morbidities (chronic disease you are already diagnosed with). Preventive services for you include:  - Medicare offers their members a free annual wellness visit, which is time for you and your primary care provider to discuss and plan for your preventive service needs. Take advantage of this benefit every year!  -All adults over age 72 should receive the recommended pneumonia vaccines. Current USPSTF guidelines recommend a series of two vaccines for the best pneumonia protection.   -All adults should have a flu vaccine yearly and tetanus vaccine every 10 years.  -All adults age 48 and older should receive the shingles vaccines (series of two vaccines).        -All adults age 38-68 who are overweight should have a diabetes screening test once every three years.   -Other screening tests & preventive services for persons with diabetes include: an eye exam to screen for diabetic retinopathy, a kidney function test, a foot exam, and stricter control over your cholesterol.   -Cardiovascular screening for adults with routine risk involves an electrocardiogram (ECG) at intervals determined by the provider.   -Colorectal cancer screening should be done for adults age 54-65 with no increased risk factors for colorectal cancer. There are a number of acceptable methods of screening for this type of cancer. Each test has its own benefits and drawbacks. Discuss with your provider what is most appropriate for you during your annual wellness visit. The different tests include: colonoscopy (considered the best screening method), a fecal occult blood test, a fecal DNA test, and sigmoidoscopy.  -All adults born between [facility] should be screened once for Hepatitis C.  -An Abdominal Aortic Aneurysm (AAA) Screening is recommended for men age 73-68 who has ever smoked in their lifetime.      Here is a list of your current Health Maintenance items (your personalized list of preventive services) with a due date:  Health Maintenance Due   Topic Date Due    Shingles Vaccine (1 of 2) Never done    Yearly Flu Vaccine (1) 09/01/2021

## 2021-12-13 ENCOUNTER — OFFICE VISIT (OUTPATIENT)
Dept: INTERNAL MEDICINE CLINIC | Age: 86
End: 2021-12-13
Payer: MEDICARE

## 2021-12-13 ENCOUNTER — HOSPITAL ENCOUNTER (OUTPATIENT)
Dept: LAB | Age: 86
Discharge: HOME | End: 2021-12-13
Payer: MEDICARE

## 2021-12-13 ENCOUNTER — HOSPITAL ENCOUNTER (OUTPATIENT)
Dept: GENERAL RADIOLOGY | Age: 86
Discharge: HOME | End: 2021-12-13
Payer: MEDICARE

## 2021-12-13 VITALS
TEMPERATURE: 97.7 F | RESPIRATION RATE: 16 BRPM | HEART RATE: 70 BPM | OXYGEN SATURATION: 98 % | WEIGHT: 177 LBS | HEIGHT: 71 IN | SYSTOLIC BLOOD PRESSURE: 121 MMHG | BODY MASS INDEX: 24.78 KG/M2 | DIASTOLIC BLOOD PRESSURE: 70 MMHG

## 2021-12-13 DIAGNOSIS — R07.82 INTERCOSTAL PAIN: Primary | ICD-10-CM

## 2021-12-13 DIAGNOSIS — R07.82 INTERCOSTAL PAIN: ICD-10-CM

## 2021-12-13 DIAGNOSIS — M54.6 ACUTE BILATERAL THORACIC BACK PAIN: ICD-10-CM

## 2021-12-13 LAB — XX-LABCORP SPECIMEN COL,LCBCF: NORMAL

## 2021-12-13 PROCEDURE — G8536 NO DOC ELDER MAL SCRN: HCPCS | Performed by: INTERNAL MEDICINE

## 2021-12-13 PROCEDURE — 99213 OFFICE O/P EST LOW 20 MIN: CPT | Performed by: INTERNAL MEDICINE

## 2021-12-13 PROCEDURE — G8427 DOCREV CUR MEDS BY ELIG CLIN: HCPCS | Performed by: INTERNAL MEDICINE

## 2021-12-13 PROCEDURE — G8510 SCR DEP NEG, NO PLAN REQD: HCPCS | Performed by: INTERNAL MEDICINE

## 2021-12-13 PROCEDURE — G8420 CALC BMI NORM PARAMETERS: HCPCS | Performed by: INTERNAL MEDICINE

## 2021-12-13 PROCEDURE — 71046 X-RAY EXAM CHEST 2 VIEWS: CPT

## 2021-12-13 PROCEDURE — 99001 SPECIMEN HANDLING PT-LAB: CPT

## 2021-12-13 PROCEDURE — 1101F PT FALLS ASSESS-DOCD LE1/YR: CPT | Performed by: INTERNAL MEDICINE

## 2021-12-13 RX ORDER — MELOXICAM 7.5 MG/1
7.5 TABLET ORAL
Qty: 20 TABLET | Refills: 0 | Status: SHIPPED | OUTPATIENT
Start: 2021-12-13 | End: 2022-01-06 | Stop reason: ALTCHOICE

## 2021-12-13 NOTE — PROGRESS NOTES
Colt Murphy presents with   Chief Complaint   Patient presents with    Back Pain     X 8 days, denies injuries, tried multiple otc creams/medications, nothing has helped            1. \"Have you been to the ER, urgent care clinic since your last visit? Hospitalized since your last visit? \" NO    2. \"Have you seen or consulted any other health care providers outside of the [address] [place] [address] since your last visit? \" NO    3. For patients aged 39-70: Has the patient had a colonoscopy? NA based on age or sex     If the patient is female:    3. For patients aged 41-77: Has the patient had a mammogram within the past 2 years? NA based on age or sex    11. For patients aged 21-65: Has the patient had a pap smear?  NA based on age or sex

## 2021-12-13 NOTE — PROGRESS NOTES
80 y.o. WHITE/NON- male who presents for evaluation. For the last week or so, he's been having back pain in the lower thoracic/upper lumbar area. Usually bilat but sometimes localizes to 1 side. No associated, n/v/d, urinary complaints, hematuria, does not feel like his stone episodes. Denied any injury, bruising, rash. No help with ibuprofen although interestingly, topical tylenol has helped more than anything(?)  No resp sx, cough, wheezing, sob, cough. Sometimes it's reproducible with specific motions    Past Medical History:   Diagnosis Date    B12 deficiency     Closed compression fracture of L4 lumbar vertebra     Colon polyps     Dr Olya Paz    Degenerative arthritis of cervical spine 12/2020    xray showed mod/sev discogenic degen changes C5-T1, asymmetric L facet hypertrophy C3-5    Degenerative arthritis of lumbar spine     MRI 11/14 w multilevel disease; chronic lbp w sciatica saw Dr Tiffanie Bills    Distal radial fracture, right wrist 02/2015    Fell on ice. s/p ORIF with right dorsal endoplate nail, two proximal screws, and four distal pegs. Dr. Massey Home.     Diverticulosis     on CT 4/16    Erectile dysfunction     Nephrolithiasis 04/2016    right tiny stone on CT; microhematuria    Osteoporosis     ibandronate 2009-5/13 stopped due to tooth issues per his endodontist;  DEXA t score -2.4 spibe, -2.1 hip (8/11); -2.3 spine, -1.9 hip (8/13); -2.4 spine, -2.4 hip (9/15)    Proctalgia fugax     RBBB (right bundle branch block)     Right inguinal hernia     Dr Gavin Gal Tinnitus      Past Surgical History:   Procedure Laterality Date    Denice Toussaint HX COLONOSCOPY      Dr Olya Paz 2012 negative    HX HERNIA REPAIR  2002    [facility] repair     Social History     Socioeconomic History    Marital status:      Spouse name: Not on file    Number of children: Not on file    Years of education: Not on file    Highest education level: Not on file   Occupational History  Not on file   Tobacco Use    Smoking status: Never Smoker    Smokeless tobacco: Never Used   Substance and Sexual Activity    Alcohol use: No    Drug use: No    Sexual activity: Not Currently   Other Topics Concern    Not on file   Social History Narrative    Not on file     Social Determinants of Health     Financial Resource Strain:     Difficulty of Paying Living Expenses: Not on file   Food Insecurity:     Worried About Running Out of Food in the Last Year: Not on file    Александр of Food in the Last Year: Not on file   Transportation Needs:     Lack of Transportation (Medical): Not on file    Lack of Transportation (Non-Medical): Not on file   Physical Activity:     Days of Exercise per Week: Not on file    Minutes of Exercise per Session: Not on file   Stress:     Feeling of Stress : Not on file   Social Connections:     Frequency of Communication with Friends and Family: Not on file    Frequency of Social Gatherings with Friends and Family: Not on file    Attends Taoist Services: Not on file    Active Member of [address] [place] [address] or Organizations: Not on file    Attends Club or Organization Meetings: Not on file    Marital Status: Not on file   Intimate Partner Violence:     Fear of Current or Ex-Partner: Not on file    Emotionally Abused: Not on file    Physically Abused: Not on file    Sexually Abused: Not on file   Housing Stability:     Unable to Pay for Housing in the Last Year: Not on file    Number of [place] in the Last Year: Not on file    Unstable Housing in the Last Year: Not on file     Current Outpatient Medications   Medication Sig    meloxicam (MOBIC) 7.5 mg tablet Take 1 Tablet by mouth daily as needed for Pain (take w meals).  vitamin X-M-S-lutein-minerals (OCUVITE) tablet 1 Tablet daily.  gabapentin (NEURONTIN) 300 mg capsule Take 1 Capsule by mouth three (3) times daily (with meals). Max Daily Amount: 900 mg.  Indications: neuropathic pain    GLUC/CHND/OM3/DHA/EPA/FISH/STR (GLUCOSAMINE CHONDROITIN PLUS PO) Take  by mouth.  cyanocobalamin (VITAMIN B-12) 1,000 mcg tablet Take 1,000 mcg by mouth daily.  Cholecalciferol, Vitamin D3, (VITAMIN D) 1,000 unit Cap Take 2,000 Units by mouth. No current facility-administered medications for this visit. Allergies   Allergen Reactions    Sulfa (Sulfonamide Antibiotics) Nausea Only    Tramadol Nausea and Vomiting     Visit Vitals  /70   Pulse 70   Temp 97.7 °F (36.5 °C) (Temporal)   Resp 16   Ht 5' 11\" (1.803 m)   Wt 177 lb (80.3 kg)   SpO2 98%   BMI 24.69 kg/m²   anicteric, no jvd. Lungs cta with good bs. Heart showed rrr, abd soft and nt. No hsm, masses. Some tenderness on palpation of the lower ribcage on the right, no bruising or rash noted,  Can't reproduce his sx with change in body position currently    Assessment and plan:  1. Thoraci/upper lumbar back pain etiology unclear. Will get cxr, labs; empiric mobic prn while awaiting evaluation, call w update        Above conditions discussed at length and patient vocalized understanding. All questions answered to patient satisfaction          ICD-10-CM ICD-9-CM    1. Intercostal pain  R07.82 786.59 XR CHEST PA LAT      meloxicam (MOBIC) 7.5 mg tablet   2.  Acute bilateral thoracic back pain  M54.6 724.1 CBC WITH AUTOMATED DIFF      LIPASE      METABOLIC PANEL, COMPREHENSIVE      meloxicam (MOBIC) 7.5 mg tablet

## 2021-12-14 LAB
ALBUMIN SERPL-MCNC: 4 G/DL (ref 3.5–4.6)
ALBUMIN/GLOB SERPL: 1.7 {RATIO} (ref 1.2–2.2)
ALP SERPL-CCNC: 76 IU/L (ref 44–121)
ALT SERPL-CCNC: 9 IU/L (ref 0–44)
AST SERPL-CCNC: 14 IU/L (ref 0–40)
BASOPHILS # BLD AUTO: 0 X10E3/UL (ref 0–0.2)
BASOPHILS NFR BLD AUTO: 0 %
BILIRUB SERPL-MCNC: 0.3 MG/DL (ref 0–1.2)
BUN SERPL-MCNC: 15 MG/DL (ref 10–36)
BUN/CREAT SERPL: 16 (ref 10–24)
CALCIUM SERPL-MCNC: 8.9 MG/DL (ref 8.6–10.2)
CHLORIDE SERPL-SCNC: 106 MMOL/L (ref 96–106)
CO2 SERPL-SCNC: 25 MMOL/L (ref 20–29)
CREAT SERPL-MCNC: 0.94 MG/DL (ref 0.76–1.27)
EOSINOPHIL # BLD AUTO: 0.1 X10E3/UL (ref 0–0.4)
EOSINOPHIL NFR BLD AUTO: 2 %
ERYTHROCYTE [DISTWIDTH] IN BLOOD BY AUTOMATED COUNT: 12.9 % (ref 11.6–15.4)
GLOBULIN SER CALC-MCNC: 2.3 G/DL (ref 1.5–4.5)
GLUCOSE SERPL-MCNC: 77 MG/DL (ref 65–99)
HCT VFR BLD AUTO: 37.1 % (ref 37.5–51)
HGB BLD-MCNC: 12.3 G/DL (ref 13–17.7)
IMM GRANULOCYTES # BLD AUTO: 0 X10E3/UL (ref 0–0.1)
IMM GRANULOCYTES NFR BLD AUTO: 0 %
LIPASE SERPL-CCNC: 12 U/L (ref 13–78)
LYMPHOCYTES # BLD AUTO: 2.4 X10E3/UL (ref 0.7–3.1)
LYMPHOCYTES NFR BLD AUTO: 46 %
MCH RBC QN AUTO: 30.5 PG (ref 26.6–33)
MCHC RBC AUTO-ENTMCNC: 33.2 G/DL (ref 31.5–35.7)
MCV RBC AUTO: 92 FL (ref 79–97)
MONOCYTES # BLD AUTO: 0.8 X10E3/UL (ref 0.1–0.9)
MONOCYTES NFR BLD AUTO: 14 %
NEUTROPHILS # BLD AUTO: 2 X10E3/UL (ref 1.4–7)
NEUTROPHILS NFR BLD AUTO: 38 %
PLATELET # BLD AUTO: 198 X10E3/UL (ref 150–450)
POTASSIUM SERPL-SCNC: 4.4 MMOL/L (ref 3.5–5.2)
PROT SERPL-MCNC: 6.3 G/DL (ref 6–8.5)
RBC # BLD AUTO: 4.03 X10E6/UL (ref 4.14–5.8)
SODIUM SERPL-SCNC: 143 MMOL/L (ref 134–144)
WBC # BLD AUTO: 5.3 X10E3/UL (ref 3.4–10.8)

## 2021-12-17 ENCOUNTER — TELEPHONE (OUTPATIENT)
Dept: INTERNAL MEDICINE CLINIC | Age: 86
End: 2021-12-17

## 2021-12-17 NOTE — TELEPHONE ENCOUNTER
pls call      IMPRESSION     Emphysematous changes. Otherwise, no radiographic evidence for acute  cardiopulmonary process.     Chronic mid and lower thoracic mild vertebral wedging. No definite acute  displaced thoracic fracture. Poorly visualized multiple lumbar compression  deformities which are mainly present on prior lumbar radiograph but difficult to  assess for interval change, if high clinical suspicion for acute lumbar  abnormalities recommend MRI.     Dictated by: Mylene Sutton MD, PGY-2    Chest clear  He has some compression fx but unclear if new or old fx  Is he better?

## 2021-12-20 ENCOUNTER — TELEPHONE (OUTPATIENT)
Dept: INTERNAL MEDICINE CLINIC | Age: 86
End: 2021-12-20

## 2021-12-20 NOTE — TELEPHONE ENCOUNTER
Left message for patient to call the office. RE: patient referred to [place], and will be getting a call.

## 2021-12-21 NOTE — TELEPHONE ENCOUNTER
Reached patient he states that he is established with Dr. Josué Mariscal and would like to see him for the ortho eval, patient advised to call office to make an appointment, verbalized understanding. He states that he finds some relief from topical rubs such as clary bonds, but tylenol has not been effective. Patient given results of imaging, but he is asking about the lab results from lab brady from 12-13-21 which appear to be drawn but no results found in [facility].

## 2022-01-05 NOTE — PROGRESS NOTES
[facility]  [address], [place]   Phone: [phone]  Fax: [phone]        PROGRESS NOTE      HISTORY OF PRESENT ILLNESS:  The patient is a 80 y.o. male and was seen today for follow up of paraesthesias in the RLE from the knee down to the foot x 2 weeks. He reports his initial back sxs have settled down. Initially had c/o acute onset of left-sided low back pain since the beginning of May 2019. Pt reports onset of sxs after bending down to  a board. He states his pain has improved since onset. Reviewing the records, it appears as though the pt has had some chronic issues with his low back, as he is on Neurontin. Pt denies h/o spinal surgery or PT. Pt reports having blocks in 2014 with relief. He has been treated with MDP with relief and Tylenol #3 without relief. He self-treats with Motrin prn. Pt denies change in bowel or bladder habits. Pt denies fever, weight loss, or skin changes. The patient is RHD. Previously seen by Dr. Elen Patel 11/24/14 with c/o left-sided low back pain, which occurred after bending over to  a box. MRI revealed compression fracture at L4. Note from Brody Weldon MD dated 5/7/19 indicating patient was seen with c/o left-low back pain extending into the left lateral thigh. Of note, pt previously saw Dr. Elen aPtel. Treated with MDP and Tramadol at that time. Lumbar spine XR dated 5/16/19 reviewed. Per report, Age indeterminate compression deformities at L1 and L2, new since 4/2016. Chronic compression deformities at T11 and L3-L5. Questionable acute on chronic compression fracture L4, with interval increase loss of vertebral body height at that level. MRI may be helpful for further evaluation. Degenerative findings are as described. Osteopenia. Lumbar spine MRI dated 6/3/19 reviewed. Per report, active marrow edema in the compressed  L4, extending to the posterior elements. No significant retropulsion.  No definite central stenosis. Foraminal narrowing at L4-L5, with possible compression of right exiting L4 nerve root. Additional findings as described. L1-L2: Minimal posterior disc bulge with no central stenosis. Facet hypertrophy with moderately severe bilateral foraminal stenosis but no definite exiting nerve root compression. Stable. L2-L3: Mild posterior lateral corner disc protrusion. Facet ligamentous hypertrophy. AP canal measures 11 mm. Posterior lateral indentation of thecal sac from facet and ligamentous hypertrophy. Moderate bilateral foraminal stenosis with no definite exiting nerve root compression. Small synovial cyst from the left facet joint, partially protruding into the central canal with minimal indentation of the thecal sac. L3-L4: Posterior disc bulge. Facet and ligamentous hypertrophy. No significant central stenosis. Moderate bilateral foraminal stenosis. L4-L5: Compression deformity and marrow edema as described. Mild posterior displacement of the anterior epidural fat at the vertebral body level. AP canal measures 11.4 mm. At the disc level, no focal disc herniation. Facet and ligamentous hypertrophy with moderately severe right and moderate left foraminal stenosis. Compression of the right exiting L4 nerve root possible. Stable. Interspinous edema posteriorly at L3-L4 and L4-L5. L5-S1: Posterior disc bulge. Mild contact to S1 nerve roots. No displacement. No central stenosis. Moderate bilateral foraminal stenosis. No definite exiting L5 root compression. Upon reviewing myself, there is evidence of possible compression fractures at L2 and L3, which appear to be chronic. At his last clinical appointment, patient wished to continue his current treatment. Patient should have continued to follow with Edward Theodore MD for refills on his Neurontin 300 mg TID.  I did discuss with the pt about obtaining a bone density scan, however pt wished to follow with Edward Theodore MD for this.       The patient returns today with thoracolumbar junction pain radiating bilaterally to the ribs. x 12/5/2021 without injury, exact distribution unclear Denies radicular sxs. He rates his pain 6/10, previously 0/10. He notes improvement since onset. His pain is aggravated with standing and walking, alleviated with sitting. Positive shopping cart sign. Pt denies following through with bone density test. Pt reports he has been taking Mobic without relief. Pt continues taking Neurontin 300 mg BID through Cal Jade MD. Pt states he was cut back to BID due to occasionally missing a dose throughout the day. Chest XR dated 12/13/2021 films not independently reviewed. Per report, chronic mid and lower thoracic mild vertebral wedging. No definite acute displaced thoracic fracture. Poorly visualized multiple lumbar compression deformities which are mainly present on prior lumbar radiograph but difficult to assess for interval change, if high clinical suspicion for acute lumbar abnormalities recommend MRI.  reviewed. Body mass index is 24.35 kg/m². 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 s/p ORIF with right dorsal endoplate nail, two proximal screws, and four distal pegs. Dr. Davey Dangelo.     Diverticulosis     on CT 4/16    Erectile dysfunction     Nephrolithiasis 04/2016    right tiny stone on CT; microhematuria    Osteoporosis     ibandronate 2009-5/13 stopped due to tooth issues per his endodontist;  DEXA t score -2.4 spibe, -2.1 hip (8/11); -2.3 spine, -1.9 hip (8/13); -2.4 spine, -2.4 hip (9/15)    Proctalgia fugax     RBBB (right bundle branch block)     Right inguinal hernia     Dr Nona Pagan Tinnitus         Social History     Socioeconomic History    Marital status:      Spouse name: Not on file    Number of children: Not on file    Years of education: Not on file    Highest education level: Not on file   Occupational History    Not on file   Tobacco Use    Smoking status: Never Smoker    Smokeless tobacco: Never Used   Substance and Sexual Activity    Alcohol use: No    Drug use: No    Sexual activity: Not Currently   Other Topics Concern    Not on file   Social History Narrative    Not on file     Social Determinants of Health     Financial Resource Strain:     Difficulty of Paying Living Expenses: Not on file   Food Insecurity:     Worried About Running Out of Food in the Last Year: Not on file    Александр of Food in the Last Year: Not on file   Transportation Needs:     Lack of Transportation (Medical): Not on file    Lack of Transportation (Non-Medical):  Not on file   Physical Activity:     Days of Exercise per Week: Not on file    Minutes of Exercise per Session: Not on file   Stress:     Feeling of Stress : Not on file   Social Connections:     Frequency of Communication with Friends and Family: Not on file    Frequency of Social Gatherings with Friends and Family: Not on file    Attends Jew Services: Not on file    Active Member of [address] [place] [address] or Organizations: Not on file    Attends Club or Organization Meetings: Not on file    Marital Status: Not on file   Intimate Partner Violence:     Fear of Current or Ex-Partner: Not on file    Emotionally Abused: Not on file    Physically Abused: Not on file    Sexually Abused: Not on file   Housing Stability:     Unable to Pay for Housing in the Last Year: Not on file    Number of [place] in the Last Year: Not on file    Unstable Housing in the Last Year: Not on file       Current Outpatient Medications   Medication Sig Dispense Refill    gabapentin (Neurontin) 300 mg capsule Take 1 Capsule by mouth three (3) times daily. Max Daily Amount: 900 mg. 90 Capsule 1    vitamin U-U-Y-lutein-minerals (OCUVITE) tablet 1 Tablet daily.  gabapentin (NEURONTIN) 300 mg capsule Take 1 Capsule by mouth three (3) times daily (with meals). Max Daily Amount: 900 mg. Indications: neuropathic pain 270 Capsule 1    GLUC/CHND/OM3/DHA/EPA/FISH/STR (GLUCOSAMINE CHONDROITIN PLUS PO) Take  by mouth.  cyanocobalamin (VITAMIN B-12) 1,000 mcg tablet Take 1,000 mcg by mouth daily.  Cholecalciferol, Vitamin D3, (VITAMIN D) 1,000 unit Cap Take 2,000 Units by mouth. Allergies   Allergen Reactions    Sulfa (Sulfonamide Antibiotics) Nausea Only     Other reaction(s): gi distress, Unknown    Tramadol Nausea and Vomiting     Other reaction(s): unknown          PHYSICAL EXAMINATION    Visit Vitals  /71 (BP 1 Location: Right arm, BP Patient Position: Sitting)   Pulse 67   Temp 97.6 °F (36.4 °C) (Temporal)   Resp 16   Ht 5' 11\" (1.803 m)   Wt 174 lb 9.6 oz (79.2 kg)   SpO2 100%   BMI 24.35 kg/m²       CONSTITUTIONAL: NAD, A&O x 3  SENSATION: Intact to light touch throughout  RANGE OF MOTION: The patient has full passive range of motion in all four extremities. MOTOR:  Straight Leg Raise: Negative, bilateral               Hip Flex Knee Ext Knee Flex Ankle DF GTE Ankle PF Tone   Right +4/5 +4/5 +4/5 +4/5 +4/5 +4/5 +4/5   Left +4/5 +4/5 +4/5 +4/5 +4/5 +4/5 +4/5     RADIOGRAPHS  No XR tech available      ASSESSMENT   Diagnoses and all orders for this visit:    1. Compression fracture of body of thoracic vertebra (HCC)  -     MRI LUMB SPINE WO CONT; Future  -     MRI [facility] SPINE WO CONT; Future  -     gabapentin (Neurontin) 300 mg capsule; Take 1 Capsule by mouth three (3) times daily.  Max Daily Amount: 900 mg.    2. Closed compression fracture of body of lumbar vertebra ([address]  - MRI LUMB SPINE WO CONT; Future  -     MRI [facility] SPINE WO CONT; Future  -     gabapentin (Neurontin) 300 mg capsule; Take 1 Capsule by mouth three (3) times daily. Max Daily Amount: 900 mg.    3. Thoracic back pain, unspecified back pain laterality, unspecified chronicity  -     MRI LUMB SPINE WO CONT; Future  -     MRI [facility] SPINE WO CONT; Future  -     gabapentin (Neurontin) 300 mg capsule; Take 1 Capsule by mouth three (3) times daily. Max Daily Amount: 900 mg.    4. Lumbar pain  -     MRI LUMB SPINE WO CONT; Future  -     MRI [facility] SPINE WO CONT; Future  -     gabapentin (Neurontin) 300 mg capsule; Take 1 Capsule by mouth three (3) times daily. Max Daily Amount: 900 mg.    5. Thoracic neuritis  -     MRI LUMB SPINE WO CONT; Future  -     MRI [facility] SPINE WO CONT; Future  -     gabapentin (Neurontin) 300 mg capsule; Take 1 Capsule by mouth three (3) times daily. Max Daily Amount: 900 mg. IMPRESSION AND PLAN:  Patient returns to the office today with c/o thoracolumbar junction pain radiating bilaterally to the ribs. x 12/5/2021 without injury. Multiple treatment options were discussed. Based on Chest XR the pts may be related to several compression fractures. I will order a T spine and L spine MRI. I advised patient to bring copies of films to next visit. I will increase his Neurontin BID to TID. I will provide him with refills. Patient is neurologically intact. I will see the patient back in 1 month's time or earlier if needed. Written by Dipti Connolly, as dictated by Jennifer Dial MD  I examined the patient, reviewed and agree with the note.

## 2022-01-06 ENCOUNTER — OFFICE VISIT (OUTPATIENT)
Dept: ORTHOPEDIC SURGERY | Age: 87
End: 2022-01-06
Payer: MEDICARE

## 2022-01-06 VITALS
BODY MASS INDEX: 24.44 KG/M2 | SYSTOLIC BLOOD PRESSURE: 104 MMHG | TEMPERATURE: 97.6 F | RESPIRATION RATE: 16 BRPM | OXYGEN SATURATION: 100 % | WEIGHT: 174.6 LBS | HEART RATE: 67 BPM | DIASTOLIC BLOOD PRESSURE: 71 MMHG | HEIGHT: 71 IN

## 2022-01-06 DIAGNOSIS — S22.000A COMPRESSION FRACTURE OF BODY OF THORACIC VERTEBRA (HCC): Primary | ICD-10-CM

## 2022-01-06 DIAGNOSIS — M54.50 LUMBAR PAIN: ICD-10-CM

## 2022-01-06 DIAGNOSIS — S32.000A CLOSED COMPRESSION FRACTURE OF BODY OF LUMBAR VERTEBRA (HCC): ICD-10-CM

## 2022-01-06 DIAGNOSIS — M54.14 THORACIC NEURITIS: ICD-10-CM

## 2022-01-06 DIAGNOSIS — M54.6 THORACIC BACK PAIN, UNSPECIFIED BACK PAIN LATERALITY, UNSPECIFIED CHRONICITY: ICD-10-CM

## 2022-01-06 PROBLEM — H90.3 SENSORINEURAL HEARING LOSS, BILATERAL: Status: ACTIVE | Noted: 2017-03-08

## 2022-01-06 PROBLEM — H93.299 ABNORMAL AUDITORY PERCEPTION: Status: ACTIVE | Noted: 2017-03-08

## 2022-01-06 PROBLEM — T16.2XXA FOREIGN BODY IN LEFT EAR: Status: ACTIVE | Noted: 2017-03-08

## 2022-01-06 PROBLEM — H61.20 IMPACTED CERUMEN: Status: ACTIVE | Noted: 2017-03-08

## 2022-01-06 PROCEDURE — G8420 CALC BMI NORM PARAMETERS: HCPCS | Performed by: PHYSICAL MEDICINE & REHABILITATION

## 2022-01-06 PROCEDURE — G8427 DOCREV CUR MEDS BY ELIG CLIN: HCPCS | Performed by: PHYSICAL MEDICINE & REHABILITATION

## 2022-01-06 PROCEDURE — G8536 NO DOC ELDER MAL SCRN: HCPCS | Performed by: PHYSICAL MEDICINE & REHABILITATION

## 2022-01-06 PROCEDURE — 1101F PT FALLS ASSESS-DOCD LE1/YR: CPT | Performed by: PHYSICAL MEDICINE & REHABILITATION

## 2022-01-06 PROCEDURE — 99214 OFFICE O/P EST MOD 30 MIN: CPT | Performed by: PHYSICAL MEDICINE & REHABILITATION

## 2022-01-06 PROCEDURE — G8432 DEP SCR NOT DOC, RNG: HCPCS | Performed by: PHYSICAL MEDICINE & REHABILITATION

## 2022-01-06 RX ORDER — GABAPENTIN 300 MG/1
300 CAPSULE ORAL 3 TIMES DAILY
Qty: 90 CAPSULE | Refills: 1 | Status: SHIPPED | OUTPATIENT
Start: 2022-01-06 | End: 2022-05-31 | Stop reason: SDUPTHER

## 2022-01-06 NOTE — LETTER
1/6/2022    Patient: Blanca Ahn   YOB: 1930   Date of Visit: 1/6/2022     [facility] Cort[facility] [address] [facility] [address]  [address]  [place]    Dear Jamie Pérez MD,      Thank you for referring Mr. Emi Lorenzo to [facility] for evaluation. My notes for this consultation are attached. If you have questions, please do not hesitate to call me. I look forward to following your patient along with you.       Sincerely,    Zoya Patten MD

## 2022-01-07 DIAGNOSIS — S32.000A CLOSED COMPRESSION FRACTURE OF BODY OF LUMBAR VERTEBRA (HCC): ICD-10-CM

## 2022-01-07 DIAGNOSIS — M54.50 LUMBAR PAIN: ICD-10-CM

## 2022-01-07 DIAGNOSIS — M54.6 THORACIC BACK PAIN, UNSPECIFIED BACK PAIN LATERALITY, UNSPECIFIED CHRONICITY: ICD-10-CM

## 2022-01-07 DIAGNOSIS — M54.14 THORACIC NEURITIS: ICD-10-CM

## 2022-01-07 DIAGNOSIS — S22.000A COMPRESSION FRACTURE OF BODY OF THORACIC VERTEBRA (HCC): ICD-10-CM

## 2022-01-10 ENCOUNTER — TELEPHONE (OUTPATIENT)
Dept: ORTHOPEDIC SURGERY | Age: 87
End: 2022-01-10

## 2022-01-10 NOTE — TELEPHONE ENCOUNTER
I spoke with Mr. Jt Symichael  and he states Dr. Amelia Shultz was going to order the bone density scan per his last office visit  If Dr. Amelia Shultz agrees, please enter the order. Mr. Jt Aleman is aware he will be contacted by [facility] central scheduling for an appointment. If not, please call Mr. Jt Symichael to advise. Thank you.

## 2022-01-10 NOTE — TELEPHONE ENCOUNTER
Sorry for any confusion. The bone density isn't urgent. Due to increase in pain, the main goal is to determine if he has any acute fractures.   The bone density will be done if no acute fractures or once acute fractures have had a chance to heal.

## 2022-01-10 NOTE — TELEPHONE ENCOUNTER
Patient called in stating \"he was supposed to have a bone density scan and has not received a call from anyone to get the test set up\"    Patient's contact is [phone]

## 2022-01-13 ENCOUNTER — HOSPITAL ENCOUNTER (OUTPATIENT)
Age: 87
Discharge: HOME | End: 2022-01-13
Attending: PHYSICAL MEDICINE & REHABILITATION
Payer: MEDICARE

## 2022-01-13 DIAGNOSIS — M54.6 THORACIC BACK PAIN, UNSPECIFIED BACK PAIN LATERALITY, UNSPECIFIED CHRONICITY: ICD-10-CM

## 2022-01-13 DIAGNOSIS — M54.14 THORACIC NEURITIS: ICD-10-CM

## 2022-01-13 DIAGNOSIS — M54.50 LUMBAR PAIN: ICD-10-CM

## 2022-01-13 DIAGNOSIS — S32.000A CLOSED COMPRESSION FRACTURE OF BODY OF LUMBAR VERTEBRA (HCC): ICD-10-CM

## 2022-01-13 DIAGNOSIS — S22.000A COMPRESSION FRACTURE OF BODY OF THORACIC VERTEBRA (HCC): ICD-10-CM

## 2022-01-13 PROCEDURE — 72148 MRI LUMBAR SPINE W/O DYE: CPT

## 2022-01-13 PROCEDURE — 72146 MRI CHEST SPINE W/O DYE: CPT

## 2022-01-21 NOTE — PROGRESS NOTES
[facility]  [address], [place]   Phone: [phone]  Fax: [phone]        PROGRESS NOTE      HISTORY OF PRESENT ILLNESS:  The patient is a 80 y.o. male and was seen today for follow up of thoracolumbar junction pain radiating bilaterally to the ribs. x 12/5/2021 without injury, exact distribution unclear Denies radicular sxs. Previously seen for paraesthesias in the RLE from the knee down to the foot x 2 weeks. He reports his initial back sxs have settled down. Initially had c/o acute onset of left-sided low back pain since the beginning of May 2019. Pt reports onset of sxs after bending down to  a board. He states his pain has improved since onset. Positive shopping cart sign. Reviewing the records, it appears as though the pt has had some chronic issues with his low back, as he is on Neurontin. Pt denies h/o spinal surgery or PT. Pt reports having blocks in 2014 with relief. He has been treated with MDP with relief and Tylenol #3 without relief. He self-treats with Motrin prn. Pt denies change in bowel or bladder habits. Pt denies fever, weight loss, or skin changes. The patient is RHD. Previously seen by Dr. Liz Camargo 11/24/14 with c/o left-sided low back pain, which occurred after bending over to  a box. MRI revealed compression fracture at L4. Note from Brody Weldon MD dated 5/7/19 indicating patient was seen with c/o left-low back pain extending into the left lateral thigh. Of note, pt previously saw Dr. Liz Camargo. Treated with MDP and Tramadol at that time. Lumbar spine XR dated 5/16/19 reviewed. Per report, Age indeterminate compression deformities at L1 and L2, new since 4/2016. Chronic compression deformities at T11 and L3-L5. Questionable acute on chronic compression fracture L4, with interval increase loss of vertebral body height at that level. MRI may be helpful for further evaluation.  Degenerative findings are as described. Osteopenia. Lumbar spine MRI dated 6/3/19 reviewed. Per report, active marrow edema in the compressed  L4, extending to the posterior elements. No significant retropulsion. No definite central stenosis. Foraminal narrowing at L4-L5, with possible compression of right exiting L4 nerve root. Additional findings as described. L1-L2: Minimal posterior disc bulge with no central stenosis. Facet hypertrophy with moderately severe bilateral foraminal stenosis but no definite exiting nerve root compression. Stable. L2-L3: Mild posterior lateral corner disc protrusion. Facet ligamentous hypertrophy. AP canal measures 11 mm. Posterior lateral indentation of thecal sac from facet and ligamentous hypertrophy. Moderate bilateral foraminal stenosis with no definite exiting nerve root compression. Small synovial cyst from the left facet joint, partially protruding into the central canal with minimal indentation of the thecal sac. L3-L4: Posterior disc bulge. Facet and ligamentous hypertrophy. No significant central stenosis. Moderate bilateral foraminal stenosis. L4-L5: Compression deformity and marrow edema as described. Mild posterior displacement of the anterior epidural fat at the vertebral body level. AP canal measures 11.4 mm. At the disc level, no focal disc herniation. Facet and ligamentous hypertrophy with moderately severe right and moderate left foraminal stenosis. Compression of the right exiting L4 nerve root possible. Stable. Interspinous edema posteriorly at L3-L4 and L4-L5. L5-S1: Posterior disc bulge. Mild contact to S1 nerve roots. No displacement. No central stenosis. Moderate bilateral foraminal stenosis. No definite exiting L5 root compression. Upon reviewing myself, there is evidence of possible compression fractures at L2 and L3, which appear to be chronic. Chest XR dated 12/13/2021 films not independently reviewed. Per report, chronic mid and lower thoracic mild vertebral wedging.  No definite acute displaced thoracic fracture. Poorly visualized multiple lumbar compression deformities which are mainly present on prior lumbar radiograph but difficult to assess for interval change, if high clinical suspicion for acute lumbar abnormalities recommend MRI. At his last clinical appointment,  based on Chest XR the pt's sxs may be related to several compression fractures. I ordered a T spine and L spine MRI. I advised patient to bring copies of films to next visit. I increased his Neurontin 300 mg BID to TID. I provided him with refills.          The patient returns today and reports pain location and distribution remains unchanged. He rates his pain 5/10, previously 6/10. Pt states his pain is about the same since last visit. He is tolerating the Neurontin 300 mg TID x 1 week without relief. Pt denies change in bowel or bladder habits. Lumbar spine MRI dated 1/13/2022 films independently reviewed. Per report, Chronic appearing compression deformities throughout the lumbar spine. Multilevel advanced facet arthropathy and multilevel mild disc herniations. No critical spinal canal stenosis at any level. Multifocal mild to moderate foraminal stenoses. Colonic diverticulosis noted. Thoracic spine MRI dated 1/13/2022 films independently reviewed. Per report, T10 mild inferior endplate acute to subacute compression fracture. Spinal canal patent throughout the thoracic spine. Multifocal mild to moderate foraminal stenoses, most notable at right T5-T6.  reviewed. There is no height or weight on file to calculate BMI.     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 s/p ORIF with right dorsal endoplate nail, two proximal screws, and four distal pegs. Dr. Hanny Martinez.  Diverticulosis     on CT 4/16    Erectile dysfunction     Nephrolithiasis 04/2016    right tiny stone on CT; microhematuria    Osteoporosis     ibandronate 2009-5/13 stopped due to tooth issues per his endodontist;  DEXA t score -2.4 spibe, -2.1 hip (8/11); -2.3 spine, -1.9 hip (8/13); -2.4 spine, -2.4 hip (9/15)    Proctalgia fugax     RBBB (right bundle branch block)     Right inguinal hernia     Dr Nona Pagan Tinnitus         Social History     Socioeconomic History    Marital status:      Spouse name: Not on file    Number of children: Not on file    Years of education: Not on file    Highest education level: Not on file   Occupational History    Not on file   Tobacco Use    Smoking status: Never Smoker    Smokeless tobacco: Never Used   Substance and Sexual Activity    Alcohol use: No    Drug use: No    Sexual activity: Not Currently   Other Topics Concern    Not on file   Social History Narrative    Not on file     Social Determinants of Health     Financial Resource Strain:     Difficulty of Paying Living Expenses: Not on file   Food Insecurity:     Worried About Running Out of Food in the Last Year: Not on file    Александр of Food in the Last Year: Not on file   Transportation Needs:     Lack of Transportation (Medical): Not on file    Lack of Transportation (Non-Medical):  Not on file   Physical Activity:     Days of Exercise per Week: Not on file    Minutes of Exercise per Session: Not on file   Stress:     Feeling of Stress : Not on file   Social Connections:     Frequency of Communication with Friends and Family: Not on file    Frequency of Social Gatherings with Friends and Family: Not on file    Attends Muslim Services: Not on file   [employer] of Clubs or Organizations: Not on file    Attends Club or Organization Meetings: Not on file    Marital Status: Not on file   Intimate Partner Violence:     Fear of Current or Ex-Partner: Not on file    Emotionally Abused: Not on file    Physically Abused: Not on file    Sexually Abused: Not on file   Housing Stability:     Unable to Pay for Housing in the Last Year: Not on file    Number of [place] in the Last Year: Not on file    Unstable Housing in the Last Year: Not on file       Current Outpatient Medications   Medication Sig Dispense Refill    gabapentin (Neurontin) 300 mg capsule Take 1 Capsule by mouth three (3) times daily. Max Daily Amount: 900 mg. 90 Capsule 1    vitamin K-Z-M-lutein-minerals (OCUVITE) tablet 1 Tablet daily.  gabapentin (NEURONTIN) 300 mg capsule Take 1 Capsule by mouth three (3) times daily (with meals). Max Daily Amount: 900 mg. Indications: neuropathic pain 270 Capsule 1    GLUC/CHND/OM3/DHA/EPA/FISH/STR (GLUCOSAMINE CHONDROITIN PLUS PO) Take  by mouth.  cyanocobalamin (VITAMIN B-12) 1,000 mcg tablet Take 1,000 mcg by mouth daily.  Cholecalciferol, Vitamin D3, (VITAMIN D) 1,000 unit Cap Take 2,000 Units by mouth. Allergies   Allergen Reactions    Sulfa (Sulfonamide Antibiotics) Nausea Only     Other reaction(s): gi distress, Unknown    Tramadol Nausea and Vomiting     Other reaction(s): unknown          PHYSICAL EXAMINATION    There were no vitals taken for this visit. CONSTITUTIONAL: NAD, A&O x 3  SENSATION: Intact to light touch throughout  RANGE OF MOTION: The patient has full passive range of motion in all four extremities. MOTOR:  Straight Leg Raise: Negative, bilateral               Hip Flex Knee Ext Knee Flex Ankle DF GTE Ankle PF Tone   Right +4/5 +4/5 +4/5 +4/5 +4/5 +4/5 +4/5   Left +4/5 +4/5 +4/5 +4/5 +4/5 +4/5 +4/5       ASSESSMENT   Diagnoses and all orders for this visit:    1. Lumbar pain    2.  Thoracic back pain, unspecified back pain laterality, unspecified chronicity    3. Compression fracture of T10 vertebra, initial encounter ([facility] [address]      IMPRESSION AND PLAN:  Patient returns to the office today with c/o thoracolumbar junction pain radiating bilaterally to the ribs. x 12/5/2021 without injury, exact distribution unclear Denies radicular sxs. Multiple treatment options were discussed. There was a T10 mild inferior endplate acute to subacute compression fracture noted on T spine MRI. I did explain to the patient the natural course of healing for these sorts of injuries. I discussed kyphoplasty with the patient. Patient wished to continue his current treatment. He did not require refills of Neurontin 300 mg TID. I provided him with Tramadol prn. I will set him up for a bone density test. Patient is neurologically intact. He should f/u with Mejia Tejada NP for osteoporosis treatment after bone density test (6 week's). I will see him earlier if needed. Written by Abdullahi Mccabe, as dictated by Vicky Ventura MD  I examined the patient, reviewed and agree with the note.

## 2022-01-24 ENCOUNTER — OFFICE VISIT (OUTPATIENT)
Dept: ORTHOPEDIC SURGERY | Age: 87
End: 2022-01-24
Payer: MEDICARE

## 2022-01-24 VITALS
HEART RATE: 79 BPM | BODY MASS INDEX: 24.47 KG/M2 | TEMPERATURE: 97.5 F | HEIGHT: 71 IN | OXYGEN SATURATION: 98 % | WEIGHT: 174.8 LBS

## 2022-01-24 DIAGNOSIS — S22.070A COMPRESSION FRACTURE OF T10 VERTEBRA, INITIAL ENCOUNTER (HCC): ICD-10-CM

## 2022-01-24 DIAGNOSIS — M54.6 THORACIC BACK PAIN, UNSPECIFIED BACK PAIN LATERALITY, UNSPECIFIED CHRONICITY: ICD-10-CM

## 2022-01-24 DIAGNOSIS — M54.50 LUMBAR PAIN: Primary | ICD-10-CM

## 2022-01-24 PROCEDURE — G8420 CALC BMI NORM PARAMETERS: HCPCS | Performed by: PHYSICAL MEDICINE & REHABILITATION

## 2022-01-24 PROCEDURE — 99214 OFFICE O/P EST MOD 30 MIN: CPT | Performed by: PHYSICAL MEDICINE & REHABILITATION

## 2022-01-24 PROCEDURE — 1101F PT FALLS ASSESS-DOCD LE1/YR: CPT | Performed by: PHYSICAL MEDICINE & REHABILITATION

## 2022-01-24 PROCEDURE — G8432 DEP SCR NOT DOC, RNG: HCPCS | Performed by: PHYSICAL MEDICINE & REHABILITATION

## 2022-01-24 PROCEDURE — G8427 DOCREV CUR MEDS BY ELIG CLIN: HCPCS | Performed by: PHYSICAL MEDICINE & REHABILITATION

## 2022-01-24 PROCEDURE — G8536 NO DOC ELDER MAL SCRN: HCPCS | Performed by: PHYSICAL MEDICINE & REHABILITATION

## 2022-01-24 RX ORDER — TRAMADOL HYDROCHLORIDE 50 MG/1
50 TABLET ORAL 2 TIMES DAILY
Qty: 60 TABLET | Refills: 0 | Status: SHIPPED | OUTPATIENT
Start: 2022-01-24 | End: 2022-01-27

## 2022-01-24 NOTE — LETTER
1/24/2022    Patient: Brendan Callaway   YOB: 1930   Date of Visit: 1/24/2022     [address] [facility]  [address]  [place]    Dear Russ Subramanian MD,      Thank you for referring Mr. Giana Singh to [address] for evaluation. My notes for this consultation are attached. If you have questions, please do not hesitate to call me. I look forward to following your patient along with you.       Sincerely,    Marck Monique MD

## 2022-01-31 DIAGNOSIS — M54.50 LUMBAR PAIN: ICD-10-CM

## 2022-01-31 DIAGNOSIS — S22.070A COMPRESSION FRACTURE OF T10 VERTEBRA, INITIAL ENCOUNTER (HCC): ICD-10-CM

## 2022-01-31 DIAGNOSIS — M54.6 THORACIC BACK PAIN, UNSPECIFIED BACK PAIN LATERALITY, UNSPECIFIED CHRONICITY: ICD-10-CM

## 2022-03-09 ENCOUNTER — TELEPHONE (OUTPATIENT)
Dept: ORTHOPEDIC SURGERY | Age: 87
End: 2022-03-09

## 2022-03-09 DIAGNOSIS — S22.000A COMPRESSION FRACTURE OF BODY OF THORACIC VERTEBRA (HCC): Primary | ICD-10-CM

## 2022-03-09 DIAGNOSIS — M54.14 THORACIC NEURITIS: ICD-10-CM

## 2022-03-09 DIAGNOSIS — S22.070A COMPRESSION FRACTURE OF T10 VERTEBRA, INITIAL ENCOUNTER (HCC): ICD-10-CM

## 2022-03-09 DIAGNOSIS — S32.000A CLOSED COMPRESSION FRACTURE OF BODY OF LUMBAR VERTEBRA (HCC): ICD-10-CM

## 2022-03-09 DIAGNOSIS — M54.6 THORACIC BACK PAIN, UNSPECIFIED BACK PAIN LATERALITY, UNSPECIFIED CHRONICITY: ICD-10-CM

## 2022-03-09 RX ORDER — HYDROCODONE BITARTRATE AND ACETAMINOPHEN 5; 325 MG/1; MG/1
1 TABLET ORAL 2 TIMES DAILY
Qty: 60 TABLET | Refills: 0 | Status: SHIPPED | OUTPATIENT
Start: 2022-03-09 | End: 2022-03-12

## 2022-03-09 NOTE — TELEPHONE ENCOUNTER
Patients wife Montez Cabrera called stating that the patient is in a lot of pain, and wanted to see if it was anyway that the patient could be seen any sooner to follow up on his bone density scan. Patient has an appointment scheduled for 3/21/22, but is in excruciating pain.  Please advise    Patient can be reached at [phone]

## 2022-03-09 NOTE — TELEPHONE ENCOUNTER
RX pended. Nothing new and pain is about the same, but the Tramadol just isn't helping. Patient transferred to  to try and get a sooner appt with Mini.

## 2022-03-09 NOTE — TELEPHONE ENCOUNTER
Patient called stating that he is experiencing pain in his back and that his tramadol is not helping. He has an appointment to go over his bone density on 3/23/22 he would like to be seen as soon as possible or talk to a nurse about what else he could possibly do.  Please advise patient at [phone]

## 2022-03-09 NOTE — TELEPHONE ENCOUNTER
Per Dr Zoie Huerta last note, patient is to follow up with Oneil Savage after the bone density. I gave message to rand EPPERSON To call patient and reschedule with [place] and to find out when his bone density test is scheduled for. What advice should I give him regarding his pain?

## 2022-03-09 NOTE — TELEPHONE ENCOUNTER
Patients wife contacted and told that we have sent in an RX for pain medication. Appointment has already been moved to a sooner date.

## 2022-03-14 ENCOUNTER — HOSPITAL ENCOUNTER (OUTPATIENT)
Dept: BONE DENSITY | Age: 87
Discharge: HOME | End: 2022-03-14
Attending: PHYSICAL MEDICINE & REHABILITATION
Payer: MEDICARE

## 2022-03-14 PROCEDURE — 77080 DXA BONE DENSITY AXIAL: CPT

## 2022-03-19 PROBLEM — T16.2XXA FOREIGN BODY IN LEFT EAR: Status: ACTIVE | Noted: 2017-03-08

## 2022-03-19 PROBLEM — H90.3 SENSORINEURAL HEARING LOSS, BILATERAL: Status: ACTIVE | Noted: 2017-03-08

## 2022-03-19 PROBLEM — Z78.9 ADVANCE DIRECTIVE IN CHART: Status: ACTIVE | Noted: 2017-09-12

## 2022-03-19 PROBLEM — H93.299 ABNORMAL AUDITORY PERCEPTION: Status: ACTIVE | Noted: 2017-03-08

## 2022-03-19 PROBLEM — H61.20 IMPACTED CERUMEN: Status: ACTIVE | Noted: 2017-03-08

## 2022-03-21 ENCOUNTER — HOSPITAL ENCOUNTER (OUTPATIENT)
Dept: LAB | Age: 87
Discharge: HOME | End: 2022-03-21
Payer: MEDICARE

## 2022-03-21 ENCOUNTER — OFFICE VISIT (OUTPATIENT)
Dept: ORTHOPEDIC SURGERY | Age: 87
End: 2022-03-21
Payer: MEDICARE

## 2022-03-21 VITALS
DIASTOLIC BLOOD PRESSURE: 74 MMHG | SYSTOLIC BLOOD PRESSURE: 119 MMHG | HEIGHT: 71 IN | TEMPERATURE: 97 F | OXYGEN SATURATION: 96 % | HEART RATE: 62 BPM | BODY MASS INDEX: 24.39 KG/M2 | RESPIRATION RATE: 14 BRPM | WEIGHT: 174.2 LBS

## 2022-03-21 DIAGNOSIS — M54.50 LUMBAR PAIN: ICD-10-CM

## 2022-03-21 DIAGNOSIS — S22.000A COMPRESSION FRACTURE OF BODY OF THORACIC VERTEBRA (HCC): Primary | ICD-10-CM

## 2022-03-21 DIAGNOSIS — S32.000A CLOSED COMPRESSION FRACTURE OF BODY OF LUMBAR VERTEBRA (HCC): ICD-10-CM

## 2022-03-21 DIAGNOSIS — M81.0 AGE RELATED OSTEOPOROSIS, UNSPECIFIED PATHOLOGICAL FRACTURE PRESENCE: ICD-10-CM

## 2022-03-21 DIAGNOSIS — S22.000A COMPRESSION FRACTURE OF BODY OF THORACIC VERTEBRA (HCC): ICD-10-CM

## 2022-03-21 LAB
25(OH)D3 SERPL-MCNC: 43.4 NG/ML (ref 30–100)
ALBUMIN SERPL-MCNC: 3.5 G/DL (ref 3.4–5)
ALBUMIN/GLOB SERPL: 1.1 {RATIO} (ref 0.8–1.7)
ALP SERPL-CCNC: 120 U/L (ref 45–117)
ALT SERPL-CCNC: 19 U/L (ref 16–61)
ANION GAP SERPL CALC-SCNC: 5 MMOL/L (ref 3–18)
AST SERPL-CCNC: 18 U/L (ref 10–38)
BILIRUB SERPL-MCNC: 0.4 MG/DL (ref 0.2–1)
BUN SERPL-MCNC: 19 MG/DL (ref 7–18)
BUN/CREAT SERPL: 21 (ref 12–20)
CALCIUM SERPL-MCNC: 9.2 MG/DL (ref 8.5–10.1)
CHLORIDE SERPL-SCNC: 108 MMOL/L (ref 100–111)
CO2 SERPL-SCNC: 27 MMOL/L (ref 21–32)
CREAT SERPL-MCNC: 0.91 MG/DL (ref 0.6–1.3)
GLOBULIN SER CALC-MCNC: 3.3 G/DL (ref 2–4)
GLUCOSE SERPL-MCNC: 80 MG/DL (ref 74–99)
MAGNESIUM SERPL-MCNC: 2.4 MG/DL (ref 1.6–2.6)
PHOSPHATE SERPL-MCNC: 3.7 MG/DL (ref 2.5–4.9)
POTASSIUM SERPL-SCNC: 4.7 MMOL/L (ref 3.5–5.5)
PROT SERPL-MCNC: 6.8 G/DL (ref 6.4–8.2)
SODIUM SERPL-SCNC: 140 MMOL/L (ref 136–145)

## 2022-03-21 PROCEDURE — 84100 ASSAY OF PHOSPHORUS: CPT

## 2022-03-21 PROCEDURE — 99204 OFFICE O/P NEW MOD 45 MIN: CPT | Performed by: NURSE PRACTITIONER

## 2022-03-21 PROCEDURE — 1101F PT FALLS ASSESS-DOCD LE1/YR: CPT | Performed by: NURSE PRACTITIONER

## 2022-03-21 PROCEDURE — 83735 ASSAY OF MAGNESIUM: CPT

## 2022-03-21 PROCEDURE — 80053 COMPREHEN METABOLIC PANEL: CPT

## 2022-03-21 PROCEDURE — G8536 NO DOC ELDER MAL SCRN: HCPCS | Performed by: NURSE PRACTITIONER

## 2022-03-21 PROCEDURE — 82306 VITAMIN D 25 HYDROXY: CPT

## 2022-03-21 PROCEDURE — G8432 DEP SCR NOT DOC, RNG: HCPCS | Performed by: NURSE PRACTITIONER

## 2022-03-21 PROCEDURE — G8420 CALC BMI NORM PARAMETERS: HCPCS | Performed by: NURSE PRACTITIONER

## 2022-03-21 PROCEDURE — G8427 DOCREV CUR MEDS BY ELIG CLIN: HCPCS | Performed by: NURSE PRACTITIONER

## 2022-03-21 PROCEDURE — 36415 COLL VENOUS BLD VENIPUNCTURE: CPT

## 2022-03-21 RX ORDER — LIDOCAINE 50 MG/G
PATCH TOPICAL
Qty: 30 EACH | Refills: 2 | Status: SHIPPED | OUTPATIENT
Start: 2022-03-21

## 2022-03-21 NOTE — PATIENT INSTRUCTIONS
Back Stretches: Exercises  Introduction  Here are some examples of exercises for stretching your back. Start each exercise slowly. Ease off the exercise if you start to have pain. Your doctor or physical therapist will tell you when you can start these exercises and which ones will work best for you. How to do the exercises  Overhead stretch    1. Stand comfortably with your feet shoulder-width apart. 2. Looking straight ahead, raise both arms over your head and reach toward the ceiling. Do not allow your head to tilt back. 3. Hold for 15 to 30 seconds, then lower your arms to your sides. 4. Repeat 2 to 4 times. Side stretch    1. Stand comfortably with your feet shoulder-width apart. 2. Raise one arm over your head, and then lean to the other side. 3. Slide your hand down your leg as you let the weight of your arm gently stretch your side muscles. Hold for 15 to 30 seconds. 4. Repeat 2 to 4 times on each side. Press-up    1. Lie on your stomach, supporting your body with your forearms. 2. Press your elbows down into the floor to raise your upper back. As you do this, relax your stomach muscles and allow your back to arch without using your back muscles. As your press up, do not let your hips or pelvis come off the floor. 3. Hold for 15 to 30 seconds, then relax. 4. Repeat 2 to 4 times. Relax and rest    1. Lie on your back with a rolled towel under your neck and a pillow under your knees. Extend your arms comfortably to your sides. 2. Relax and breathe normally. 3. Remain in this position for about 10 minutes. 4. If you can, do this 2 or 3 times each day. Follow-up care is a key part of your treatment and safety. Be sure to make and go to all appointments, and call your doctor if you are having problems. It's also a good idea to know your test results and keep a list of the medicines you take. Where can you learn more?   Go to http://www.gray.com/  Enter Y090 in the search box to learn more about \"Back Stretches: Exercises. \"  Current as of: July 1, 2021               Content Version: 13.2  © 2006-2022 [employer]. Care instructions adapted under license by [employer] (which disclaims liability or warranty for this information). If you have questions about a medical condition or this instruction, always ask your healthcare professional. [address] any warranty or liability for your use of this information. Low Back Pain: Exercises  Introduction  Here are some examples of exercises for you to try. The exercises may be suggested for a condition or for rehabilitation. Start each exercise slowly. Ease off the exercises if you start to have pain. You will be told when to start these exercises and which ones will work best for you. How to do the exercises  Press-up    1. Lie on your stomach, supporting your body with your forearms. 2. Press your elbows down into the floor to raise your upper back. As you do this, relax your stomach muscles and allow your back to arch without using your back muscles. As your press up, do not let your hips or pelvis come off the floor. 3. Hold for 15 to 30 seconds, then relax. 4. Repeat 2 to 4 times. Alternate arm and leg (bird dog) exercise    Do this exercise slowly. Try to keep your body straight at all times, and do not let one hip drop lower than the other. 1. Start on the floor, on your hands and knees. 2. Tighten your belly muscles. 3. Raise one leg off the floor, and hold it straight out behind you. Be careful not to let your hip drop down, because that will twist your trunk. 4. Hold for about 6 seconds, then lower your leg and switch to the other leg. 5. Repeat 8 to 12 times on each leg. 6. Over time, work up to holding for 10 to 30 seconds each time.   7. If you feel stable and secure with your leg raised, try raising the opposite arm straight out in front of you at the same time.  Knee-to-chest exercise    1. Lie on your back with your knees bent and your feet flat on the floor. 2. Bring one knee to your chest, keeping the other foot flat on the floor (or keeping the other leg straight, whichever feels better on your lower back). 3. Keep your lower back pressed to the floor. Hold for at least 15 to 30 seconds. 4. Relax, and lower the knee to the starting position. 5. Repeat with the other leg. Repeat 2 to 4 times with each leg. 6. To get more stretch, put your other leg flat on the floor while pulling your knee to your chest.  Curl-ups    1. Lie on the floor on your back with your knees bent at a 90-degree angle. Your feet should be flat on the floor, about 12 inches from your buttocks. 2. Cross your arms over your chest. If this bothers your neck, try putting your hands behind your neck (not your head), with your elbows spread apart. 3. Slowly tighten your belly muscles and raise your shoulder blades off the floor. 4. Keep your head in line with your body, and do not press your chin to your chest.  5. Hold this position for 1 or 2 seconds, then slowly lower yourself back down to the floor. 6. Repeat 8 to 12 times. Pelvic tilt exercise    1. Lie on your back with your knees bent. 2. \"Brace\" your stomach. This means to tighten your muscles by pulling in and imagining your belly button moving toward your spine. You should feel like your back is pressing to the floor and your hips and pelvis are rocking back. 3. Hold for about 6 seconds while you breathe smoothly. 4. Repeat 8 to 12 times. Heel dig bridging    1. Lie on your back with both knees bent and your ankles bent so that only your heels are digging into the floor. Your knees should be bent about 90 degrees. 2. Then push your heels into the floor, squeeze your buttocks, and lift your hips off the floor until your shoulders, hips, and knees are all in a straight line.   3. Hold for about 6 seconds as you continue to breathe normally, and then slowly lower your hips back down to the floor and rest for up to 10 seconds. 4. Do 8 to 12 repetitions. Hamstring stretch in doorway    1. Lie on your back in a doorway, with one leg through the open door. 2. Slide your leg up the wall to straighten your knee. You should feel a gentle stretch down the back of your leg. 3. Hold the stretch for at least 15 to 30 seconds. Do not arch your back, point your toes, or bend either knee. Keep one heel touching the floor and the other heel touching the wall. 4. Repeat with your other leg. 5. Do 2 to 4 times for each leg. Hip flexor stretch    1. Kneel on the floor with one knee bent and one leg behind you. Place your forward knee over your foot. Keep your other knee touching the floor. 2. Slowly push your hips forward until you feel a stretch in the upper thigh of your rear leg. 3. Hold the stretch for at least 15 to 30 seconds. Repeat with your other leg. 4. Do 2 to 4 times on each side. Wall sit    1. Stand with your back 10 to 12 inches away from a wall. 2. Lean into the wall until your back is flat against it. 3. Slowly slide down until your knees are slightly bent, pressing your lower back into the wall. 4. Hold for about 6 seconds, then slide back up the wall. 5. Repeat 8 to 12 times. Follow-up care is a key part of your treatment and safety. Be sure to make and go to all appointments, and call your doctor if you are having problems. It's also a good idea to know your test results and keep a list of the medicines you take. Where can you learn more? Go to http://www.gray.com/  Enter N878 in the search box to learn more about \"Low Back Pain: Exercises. \"  Current as of: July 1, 2021               Content Version: 13.2  © 6066-5983 [employer]. Care instructions adapted under license by [employer] (which disclaims liability or warranty for this information).  If you have questions about a medical condition or this instruction, always ask your healthcare professional. [place] [address] any warranty or liability for your use of this information.

## 2022-03-21 NOTE — PROGRESS NOTES
Chief complaint   Chief Complaint   Patient presents with    Back Pain       History of Present Illness:  Brendan Callaway is a  80 y.o.  male who comes in today with his daughter for an osteoporosis visit. He has had some compression fractures and Dr. Sachin Viramontes ordered a bone density test.  He has completed that and it does show him to be osteoporotic. The worst area is the right femur at -2.8. The left femur is -2.6. He states he continues to have back pain more up in the thoracic area and the paraspinal muscles. He describes it as a dull aching pain all the time. He states he has tried Motrin and Mobic without any relief. He has tried tramadol and hydrocodone without relief. Goody's powders do help and he takes them 2-3 times a day. He also uses lidocaine patches that are from his wife and he states that also helps. 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 He denies fever bowel bladder dysfunction. He is a non-smoker. He is retired. He has been on gabapentin 300 mg 3 times a day for quite some time. He states he used to have radiating right leg. This is now controlled. Physical Exam: The patient is a 60-year-old male well-developed well-nourished who is alert and oriented with a normal mood and affect. He has a full weightbearing nonantalgic gait. He did not use any assistive device. He is not tender to palpation over his thoracic or lumbar spine. Assessment and Plan: This is a patient who has osteoporosis and has a history of compression fractures. We will get the lab work done so we can set him up to have Prolia injections. He is going to try taking arthritis strength Tylenol along with the Goody powders for his pain. I have given him his own prescription of Lidoderm patches. We will see him back in 6 months sooner if needed.       Medications:  Current Outpatient Medications   Medication Sig Dispense Refill    lidocaine (LIDODERM) 5 % Apply patch to the affected area for 12 hours a day and remove for 12 hours a day. 30 Each 2    gabapentin (Neurontin) 300 mg capsule Take 1 Capsule by mouth three (3) times daily. Max Daily Amount: 900 mg. 90 Capsule 1    vitamin C-D-D-lutein-minerals (OCUVITE) tablet 1 Tablet daily.  gabapentin (NEURONTIN) 300 mg capsule Take 1 Capsule by mouth three (3) times daily (with meals). Max Daily Amount: 900 mg. Indications: neuropathic pain 270 Capsule 1    GLUC/CHND/OM3/DHA/EPA/FISH/STR (GLUCOSAMINE CHONDROITIN PLUS PO) Take  by mouth.  cyanocobalamin (VITAMIN B-12) 1,000 mcg tablet Take 1,000 mcg by mouth daily.  Cholecalciferol, Vitamin D3, (VITAMIN D) 1,000 unit Cap Take 2,000 Units by mouth. 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 s/p ORIF with right dorsal endoplate nail, two proximal screws, and four distal pegs. Dr. Salma Young.     Diverticulosis     on CT 4/16    Erectile dysfunction     Nephrolithiasis 04/2016    right tiny stone on CT; microhematuria    Osteoporosis     ibandronate 2009-5/13 stopped due to tooth issues per his endodontist;  DEXA t score -2.4 spibe, -2.1 hip (8/11); -2.3 spine, -1.9 hip (8/13); -2.4 spine, -2.4 hip (9/15)    Proctalgia fugax     RBBB (right bundle branch block)     Right inguinal hernia     Dr Kirsten Adkins Tinnitus      Past Surgical History:   Procedure Laterality Date    HX CATARACT REMOVAL      HX COLONOSCOPY      Dr Keshav Frias 2012 negative    HX HERNIA REPAIR  2002    [facility] repair     Social History     Socioeconomic History    Marital status:      Spouse name: Not on file    Number of children: Not on file    Years of education: Not on file    Highest education level: Not on file   Occupational History    Not on file   Tobacco Use    Smoking status: Never Smoker    Smokeless tobacco: Never Used   Substance and Sexual Activity    Alcohol use: No    Drug use: No    Sexual activity: Not Currently   Other Topics Concern    Not on file   Social History Narrative    Not on file     Social Determinants of Health     Financial Resource Strain:     Difficulty of Paying Living Expenses: Not on file   Food Insecurity:     Worried About Running Out of Food in the Last Year: Not on file    Александр of Food in the Last Year: Not on file   Transportation Needs:     Lack of Transportation (Medical): Not on file    Lack of Transportation (Non-Medical): Not on file   Physical Activity:     Days of Exercise per Week: Not on file    Minutes of Exercise per Session: Not on file   Stress:     Feeling of Stress : Not on file   Social Connections:     Frequency of Communication with Friends and Family: Not on file    Frequency of Social Gatherings with Friends and Family: Not on file    Attends [employer] Services: Not on file    Active Member of [address] [place] [address] or Organizations: Not on file    Attends Club or Organization Meetings: Not on file    Marital Status: Not on file   Intimate Partner Violence:     Fear of Current or Ex-Partner: Not on file    Emotionally Abused: Not on file    Physically Abused: Not on file    Sexually Abused: Not on file   Housing Stability:     Unable to Pay for Housing in the Last Year: Not on file    Number of [place] in the Last Year: Not on file    Unstable Housing in the Last Year: Not on file     History reviewed. No pertinent family history.     Physical Exam:  Visit Vitals  /74 (BP 1 Location: Right arm, BP Patient Position: Sitting)   Pulse 62   Temp 97 °F (36.1 °C) (Temporal)   Resp 14   Ht 5' 11\" (1.803 m)   Wt 174 lb 3.2 oz (79 kg)   SpO2 96%   BMI 24.30 kg/m²     Pain Scale: 7/10       has been . reviewed and is appropriate          Diagnoses and all orders for this visit:    1. Compression fracture of body of thoracic vertebra (HCC)  -     METABOLIC PANEL, COMPREHENSIVE; Future  -     VITAMIN D, 25 HYDROXY; Future  -     PHOSPHORUS; Future  -     MAGNESIUM; Future  -     lidocaine (LIDODERM) 5 %; Apply patch to the affected area for 12 hours a day and remove for 12 hours a day. 2. Closed compression fracture of body of lumbar vertebra (HCC)  -     METABOLIC PANEL, COMPREHENSIVE; Future  -     VITAMIN D, 25 HYDROXY; Future  -     PHOSPHORUS; Future  -     MAGNESIUM; Future  -     lidocaine (LIDODERM) 5 %; Apply patch to the affected area for 12 hours a day and remove for 12 hours a day. 3. Lumbar pain  -     METABOLIC PANEL, COMPREHENSIVE; Future  -     VITAMIN D, 25 HYDROXY; Future  -     PHOSPHORUS; Future  -     MAGNESIUM; Future  -     lidocaine (LIDODERM) 5 %; Apply patch to the affected area for 12 hours a day and remove for 12 hours a day. 4. Age related osteoporosis, unspecified pathological fracture presence  -     METABOLIC PANEL, COMPREHENSIVE; Future  -     VITAMIN D, 25 HYDROXY; Future  -     PHOSPHORUS; Future  -     MAGNESIUM; Future            Follow-up and Dispositions    · Return in about 6 months (around 9/21/2022) for with Dr. Elizabeth Alegria.              We have informed Porsche Taylor to notify us for immediate appointment if he has any worsening neurogical symptoms or if an emergency situation presents, then call [phone]

## 2022-03-22 NOTE — PROGRESS NOTES
Let pt know that his labs are ok to start prolia.   I will put in the order and have it faxed to the infusion center at [facility]

## 2022-03-23 ENCOUNTER — DOCUMENTATION ONLY (OUTPATIENT)
Dept: ORTHOPEDIC SURGERY | Age: 87
End: 2022-03-23

## 2022-03-23 NOTE — PROGRESS NOTES
The pt's order form for prolia was faxed over to the [address]. A fax confirmation was received. They will reach out to the pt to schedule. Order form sent to scanning dept.

## 2022-03-24 ENCOUNTER — TELEPHONE (OUTPATIENT)
Dept: ORTHOPEDIC SURGERY | Age: 87
End: 2022-03-24

## 2022-03-24 NOTE — TELEPHONE ENCOUNTER
The pt was contacted. He was notified that his labs came back and that he can start the prolia injections. The pt verbalized understanding and states that he is not sure that he is going to start this medication. He would like to do some more research on the medication. He states that he will call the infusion center if he decides to start it. He reports that someone from there has already contacted him. Message will be sent to the provider for notification.

## 2022-03-24 NOTE — TELEPHONE ENCOUNTER
----- Message from Reymundo Colon NP sent at 3/22/2022  5:22 PM EDT -----  Let pt know that his labs are ok to start prolia.   I will put in the order and have it faxed to the infusion center at [facility]

## 2022-03-29 ENCOUNTER — TELEPHONE (OUTPATIENT)
Dept: ORTHOPEDIC SURGERY | Age: 87
End: 2022-03-29

## 2022-03-29 NOTE — TELEPHONE ENCOUNTER
Patient called stating he needs to speak with NP Lucio Staff in regards to his lab results, and other concerns he has. Patient was offered an appointment, and declined. Patient can be reached at [phone].

## 2022-03-29 NOTE — TELEPHONE ENCOUNTER
I called and spoke to the pt. The pt was identified using 2 pt identifiers. He verbalized that he keeps having rib pain and no one seems to be doing anything about it. He states that the pain moves and does not seem to respond to any medication. He has taken pain meds and this does not touch it either. He would like options on how to treat. The message will be routed to the provider for review. Pt would like to know if a chiropractor would help this type of pain.

## 2022-03-30 NOTE — TELEPHONE ENCOUNTER
I called and spoke to the pt. The pt was identified using 2 pt identifiers. He was offered an appt with Dr. Emeli Echevarria tomorrow at [id number]. The pt accepted the appt and is aware of the office location.

## 2022-03-31 ENCOUNTER — HOSPITAL ENCOUNTER (OUTPATIENT)
Dept: GENERAL RADIOLOGY | Age: 87
Discharge: HOME | End: 2022-03-31
Payer: MEDICARE

## 2022-03-31 ENCOUNTER — OFFICE VISIT (OUTPATIENT)
Dept: ORTHOPEDIC SURGERY | Age: 87
End: 2022-03-31
Payer: MEDICARE

## 2022-03-31 VITALS
HEIGHT: 71 IN | DIASTOLIC BLOOD PRESSURE: 77 MMHG | OXYGEN SATURATION: 97 % | WEIGHT: 175 LBS | HEART RATE: 66 BPM | SYSTOLIC BLOOD PRESSURE: 134 MMHG | BODY MASS INDEX: 24.5 KG/M2 | RESPIRATION RATE: 16 BRPM | TEMPERATURE: 94.4 F

## 2022-03-31 DIAGNOSIS — M54.6 THORACIC BACK PAIN, UNSPECIFIED BACK PAIN LATERALITY, UNSPECIFIED CHRONICITY: ICD-10-CM

## 2022-03-31 DIAGNOSIS — S22.000A COMPRESSION FRACTURE OF BODY OF THORACIC VERTEBRA (HCC): Primary | ICD-10-CM

## 2022-03-31 DIAGNOSIS — S22.000A COMPRESSION FRACTURE OF BODY OF THORACIC VERTEBRA (HCC): ICD-10-CM

## 2022-03-31 DIAGNOSIS — M80.00XA AGE-RELATED OSTEOPOROSIS WITH CURRENT PATHOLOGICAL FRACTURE, INITIAL ENCOUNTER: ICD-10-CM

## 2022-03-31 PROCEDURE — 72070 X-RAY EXAM THORAC SPINE 2VWS: CPT

## 2022-03-31 PROCEDURE — G8420 CALC BMI NORM PARAMETERS: HCPCS | Performed by: PHYSICAL MEDICINE & REHABILITATION

## 2022-03-31 PROCEDURE — G8427 DOCREV CUR MEDS BY ELIG CLIN: HCPCS | Performed by: PHYSICAL MEDICINE & REHABILITATION

## 2022-03-31 PROCEDURE — 99213 OFFICE O/P EST LOW 20 MIN: CPT | Performed by: PHYSICAL MEDICINE & REHABILITATION

## 2022-03-31 PROCEDURE — 1101F PT FALLS ASSESS-DOCD LE1/YR: CPT | Performed by: PHYSICAL MEDICINE & REHABILITATION

## 2022-03-31 PROCEDURE — G8432 DEP SCR NOT DOC, RNG: HCPCS | Performed by: PHYSICAL MEDICINE & REHABILITATION

## 2022-03-31 PROCEDURE — G8536 NO DOC ELDER MAL SCRN: HCPCS | Performed by: PHYSICAL MEDICINE & REHABILITATION

## 2022-03-31 NOTE — PROGRESS NOTES
[facility]  [address], [place]   Phone: [phone]  Fax: [phone]        PROGRESS NOTE      HISTORY OF PRESENT ILLNESS:  The patient is a 80 y.o. male and was seen today for follow up of thoracolumbar junction pain radiating bilaterally to the ribs. x 12/5/2021 without injury, exact distribution unclear Denies radicular sxs. Previously seen for paraesthesias in the RLE from the knee down to the foot x 2 weeks. He reports his initial back sxs have settled down. Initially had c/o acute onset of left-sided low back pain since the beginning of May 2019. Pt reports onset of sxs after bending down to  a board. He states his pain has improved since onset. Positive shopping cart sign. Reviewing the records, it appears as though the pt has had some chronic issues with his low back, as he is on Neurontin. Pt denies h/o spinal surgery or PT. Pt reports having blocks in 2014 with relief. He has been treated with MDP with relief and Tylenol #3 without relief. He self-treats with Motrin prn. Pt denies change in bowel or bladder habits. Pt denies fever, weight loss, or skin changes. The patient is RHD. Previously seen by Dr. Brittney Keene 11/24/14 with c/o left-sided low back pain, which occurred after bending over to  a box. MRI revealed compression fracture at L4. Note from Brody Weldon MD dated 5/7/19 indicating patient was seen with c/o left-low back pain extending into the left lateral thigh. Of note, pt previously saw Dr. Brittney Keene. Treated with MDP and Tramadol at that time. Lumbar spine XR dated 5/16/19 reviewed. Per report, Age indeterminate compression deformities at L1 and L2, new since 4/2016. Chronic compression deformities at T11 and L3-L5. Questionable acute on chronic compression fracture L4, with interval increase loss of vertebral body height at that level. MRI may be helpful for further evaluation.  Degenerative findings are as described. Osteopenia. Lumbar spine MRI dated 6/3/19 reviewed. Per report, active marrow edema in the compressed  L4, extending to the posterior elements. No significant retropulsion. No definite central stenosis. Foraminal narrowing at L4-L5, with possible compression of right exiting L4 nerve root. Additional findings as described. L1-L2: Minimal posterior disc bulge with no central stenosis. Facet hypertrophy with moderately severe bilateral foraminal stenosis but no definite exiting nerve root compression. Stable. L2-L3: Mild posterior lateral corner disc protrusion. Facet ligamentous hypertrophy. AP canal measures 11 mm. Posterior lateral indentation of thecal sac from facet and ligamentous hypertrophy. Moderate bilateral foraminal stenosis with no definite exiting nerve root compression. Small synovial cyst from the left facet joint, partially protruding into the central canal with minimal indentation of the thecal sac. L3-L4: Posterior disc bulge. Facet and ligamentous hypertrophy. No significant central stenosis. Moderate bilateral foraminal stenosis. L4-L5: Compression deformity and marrow edema as described. Mild posterior displacement of the anterior epidural fat at the vertebral body level. AP canal measures 11.4 mm. At the disc level, no focal disc herniation. Facet and ligamentous hypertrophy with moderately severe right and moderate left foraminal stenosis. Compression of the right exiting L4 nerve root possible. Stable. Interspinous edema posteriorly at L3-L4 and L4-L5. L5-S1: Posterior disc bulge. Mild contact to S1 nerve roots. No displacement. No central stenosis. Moderate bilateral foraminal stenosis. No definite exiting L5 root compression. Upon reviewing myself, there is evidence of possible compression fractures at L2 and L3, which appear to be chronic. Chest XR dated 12/13/2021 films not independently reviewed. Per report, chronic mid and lower thoracic mild vertebral wedging.  No definite acute displaced thoracic fracture. Poorly visualized multiple lumbar compression deformities which are mainly present on prior lumbar radiograph but difficult to assess for interval change, if high clinical suspicion for acute lumbar abnormalities recommend MRI. Lumbar spine MRI dated 1/13/2022 films independently reviewed. Per report, Chronic appearing compression deformities throughout the lumbar spine. Multilevel advanced facet arthropathy and multilevel mild disc herniations. No critical spinal canal stenosis at any level. Multifocal mild to moderate foraminal stenoses. Colonic diverticulosis noted. Thoracic spine MRI dated 1/13/2022 films independently reviewed. Per report, T10 mild inferior endplate acute to subacute compression fracture. Spinal canal patent throughout the thoracic spine. Multifocal mild to moderate foraminal stenoses, most notable at right T5-T6. At his last clinical appointment, there was a T10 mild inferior endplate acute to subacute compression fracture noted on T spine MRI. I did explain to the patient the natural course of healing for these sorts of injuries. I discussed kyphoplasty with the patient. Patient wished to continue his current treatment. He did not require refills of Neurontin 300 mg TID. I provided him with Tramadol prn. I set him up for a bone density test. Patient is neurologically intact. He should f/u with Eduardo Saldaña NP for osteoporosis treatment after bone density test (6 week's). The patient returns today with progressive thoracic pain radiating into his ribs x 5-6 weeks w/o injury, denies radicular sxs. He rates his pain 5/10, unchanged. He notes he experiences some abdominal pain at times which radiates from his T spine. Pt was contacted on 3/24/2022 and was told labs were ok and advised to start prolia infusion and the pt didn't seem to understand that. Pt message from 3/29/2022 indicated he has rib pain and no one seems to be doing anything about it.  He was not responding to medications and had been taking pain medications without relief. He denies any falls. Pt denies change in bowel or bladder habits. Note from Shelly Wall NP dated 3/21/2022 indicating patient was seen for osteoporosis. Recent bone density showed he is osteoporotic. Continues to have T spine pain, described as dull and achy. Lidoderm patches help. He continues to take Neurontin. Ordered lab studies on him and provided with Lidoderm patches. Scheduled to f/u in 6 month's time with me.  reviewed. Body mass index is 24.41 kg/m². PCP: Timoteo Lipscomb MD      Past Medical History:   Diagnosis Date    B12 deficiency     Closed compression fracture of L4 lumbar vertebra     Colon polyps     Dr Yvonne Parada    Degenerative arthritis of cervical spine 12/2020    xray showed mod/sev discogenic degen changes C5-T1, asymmetric L facet hypertrophy C3-5    Degenerative arthritis of lumbar spine     MRI 11/14 w multilevel disease; chronic lbp w sciatica saw Dr Isaias Lima    Distal radial fracture, right wrist 02/2015    Fell on ice. s/p ORIF with right dorsal endoplate nail, two proximal screws, and four distal pegs. Dr. Marly Vuong.     Diverticulosis     on CT 4/16    Erectile dysfunction     Nephrolithiasis 04/2016    right tiny stone on CT; microhematuria    Osteoporosis     ibandronate 2009-5/13 stopped due to tooth issues per his endodontist;  DEXA t score -2.4 spibe, -2.1 hip (8/11); -2.3 spine, -1.9 hip (8/13); -2.4 spine, -2.4 hip (9/15)    Proctalgia fugax     RBBB (right bundle branch block)     Right inguinal hernia     Dr De Dios [facility]    Tinnitus         Social History     Socioeconomic History    Marital status:      Spouse name: Not on file    Number of children: Not on file    Years of education: Not on file    Highest education level: Not on file   Occupational History    Not on file   Tobacco Use    Smoking status: Never Smoker    Smokeless tobacco: Never Used Substance and Sexual Activity    Alcohol use: No    Drug use: No    Sexual activity: Not Currently   Other Topics Concern    Not on file   Social History Narrative    Not on file     Social Determinants of Health     Financial Resource Strain:     Difficulty of Paying Living Expenses: Not on file   Food Insecurity:     Worried About Running Out of Food in the Last Year: Not on file    Александр of Food in the Last Year: Not on file   Transportation Needs:     Lack of Transportation (Medical): Not on file    Lack of Transportation (Non-Medical): Not on file   Physical Activity:     Days of Exercise per Week: Not on file    Minutes of Exercise per Session: Not on file   Stress:     Feeling of Stress : Not on file   Social Connections:     Frequency of Communication with Friends and Family: Not on file    Frequency of Social Gatherings with Friends and Family: Not on file    Attends Orthodox Services: Not on file    Active Member of [address] [place] [address] or Organizations: Not on file    Attends Club or Organization Meetings: Not on file    Marital Status: Not on file   Intimate Partner Violence:     Fear of Current or Ex-Partner: Not on file    Emotionally Abused: Not on file    Physically Abused: Not on file    Sexually Abused: Not on file   Housing Stability:     Unable to Pay for Housing in the Last Year: Not on file    Number of [place] in the Last Year: Not on file    Unstable Housing in the Last Year: Not on file       Current Outpatient Medications   Medication Sig Dispense Refill    lidocaine (LIDODERM) 5 % Apply patch to the affected area for 12 hours a day and remove for 12 hours a day. 30 Each 2    gabapentin (Neurontin) 300 mg capsule Take 1 Capsule by mouth three (3) times daily. Max Daily Amount: 900 mg. 90 Capsule 1    vitamin P-F-B-lutein-minerals (OCUVITE) tablet 1 Tablet daily.  gabapentin (NEURONTIN) 300 mg capsule Take 1 Capsule by mouth three (3) times daily (with meals). Max Daily Amount: 900 mg. Indications: neuropathic pain 270 Capsule 1    GLUC/CHND/OM3/DHA/EPA/FISH/STR (GLUCOSAMINE CHONDROITIN PLUS PO) Take  by mouth.  cyanocobalamin (VITAMIN B-12) 1,000 mcg tablet Take 1,000 mcg by mouth daily.  Cholecalciferol, Vitamin D3, (VITAMIN D) 1,000 unit Cap Take 2,000 Units by mouth. Allergies   Allergen Reactions    Sulfa (Sulfonamide Antibiotics) Nausea Only     Other reaction(s): gi distress, Unknown    Tramadol Nausea and Vomiting     Other reaction(s): unknown          PHYSICAL EXAMINATION    Visit Vitals  /77 (BP 1 Location: Right arm, BP Patient Position: Sitting, BP Cuff Size: Adult)   Pulse 66   Temp (!) 94.4 °F (34.7 °C) (Tympanic)   Resp 16   Ht 5' 11\" (1.803 m)   Wt 175 lb (79.4 kg)   SpO2 97%   BMI 24.41 kg/m²       CONSTITUTIONAL: NAD, A&O x 3  SENSATION: Intact to light touch throughout  RANGE OF MOTION: The patient has full passive range of motion in all four extremities. MOTOR:  Straight Leg Raise: Negative, bilateral    No increase in tenderness with direct palpation of the T spine along the midline. Hip Flex Knee Ext Knee Flex Ankle DF GTE Ankle PF Tone   Right +4/5 +4/5 +4/5 +4/5 +4/5 +4/5 +4/5   Left +4/5 +4/5 +4/5 +4/5 +4/5 +4/5 +4/5       ASSESSMENT   Diagnoses and all orders for this visit:    1. Compression fracture of body of thoracic vertebra (HCC)    2. Thoracic back pain, unspecified back pain laterality, unspecified chronicity    3. Age-related osteoporosis with current pathological fracture, initial encounter      IMPRESSION AND PLAN:  Patient returns to the office today with c/o progressive thoracolumbar junction pain radiating bilaterally to the ribs x 5-6 weeks. Multiple treatment options were discussed. I will order an out of office T spine XR to evaluate for possibly a new compression fracture. I advised patient to bring copies of films to next visit. I advised against chiropractic treatment.  I recommended him to proceed with prolia injections as recommended by Rico Boykin NP as the pt is at high risk for compression fractures. Pt wishes to proceed with prolia infusions. He did not require refills of Neurontin 300 mg TID. I advised him to continue with Lidocaine patches. Patient is neurologically intact. I will see the patient back in 1 month's time or earlier if needed. Written by Rey Landrum, as dictated by Bulmaro Miles MD  I examined the patient, reviewed and agree with the note.

## 2022-04-04 ENCOUNTER — TELEPHONE (OUTPATIENT)
Dept: ORTHOPEDIC SURGERY | Age: 87
End: 2022-04-04

## 2022-04-04 NOTE — TELEPHONE ENCOUNTER
Prolia form has been to [facility] and confirmation has been received. Please advise on the thoracic xray patient had done on 3/31/22 in [facility].

## 2022-04-04 NOTE — TELEPHONE ENCOUNTER
Patient called wanting to know when he can be scheduled for his prolia injections. Patient also stated he was not informed of what his xray results were from 3/31. Patient is requesting a call back today and can be reached at [phone].

## 2022-04-05 NOTE — TELEPHONE ENCOUNTER
Patient called requesting a phone call about scheduling his injection.  Patient contact is [phone]

## 2022-04-05 NOTE — TELEPHONE ENCOUNTER
Give pt the number to the  infusion center so he can check on his Prolia. The order was already faxed there by you,.

## 2022-04-06 NOTE — TELEPHONE ENCOUNTER
Called patient [phone] and someone picked up the phone and then hung up. I was calling to inform patient that Dr. Drew Tello has reviewed his xrays and there are no new fractures identified and the number for the [facility] is [phone]. No further action is needed at this time.

## 2022-04-12 RX ORDER — EPINEPHRINE 1 MG/ML
0.3 INJECTION, SOLUTION, CONCENTRATE INTRAVENOUS AS NEEDED
Status: CANCELLED | OUTPATIENT
Start: 2022-04-19

## 2022-04-12 RX ORDER — DIPHENHYDRAMINE HYDROCHLORIDE 50 MG/ML
50 INJECTION, SOLUTION INTRAMUSCULAR; INTRAVENOUS AS NEEDED
Status: CANCELLED
Start: 2022-04-19

## 2022-04-12 RX ORDER — DIPHENHYDRAMINE HYDROCHLORIDE 50 MG/ML
25 INJECTION, SOLUTION INTRAMUSCULAR; INTRAVENOUS AS NEEDED
Status: CANCELLED
Start: 2022-04-19

## 2022-04-12 RX ORDER — ONDANSETRON 2 MG/ML
8 INJECTION INTRAMUSCULAR; INTRAVENOUS AS NEEDED
Status: CANCELLED | OUTPATIENT
Start: 2022-04-19

## 2022-04-12 RX ORDER — ALBUTEROL SULFATE 0.83 MG/ML
2.5 SOLUTION RESPIRATORY (INHALATION) AS NEEDED
Status: CANCELLED
Start: 2022-04-19

## 2022-04-12 RX ORDER — ACETAMINOPHEN 325 MG/1
650 TABLET ORAL AS NEEDED
Status: CANCELLED
Start: 2022-04-19

## 2022-04-12 RX ORDER — HYDROCORTISONE SODIUM SUCCINATE 100 MG/2ML
100 INJECTION, POWDER, FOR SOLUTION INTRAMUSCULAR; INTRAVENOUS AS NEEDED
Status: CANCELLED | OUTPATIENT
Start: 2022-04-19

## 2022-04-19 ENCOUNTER — HOSPITAL ENCOUNTER (OUTPATIENT)
Dept: INFUSION THERAPY | Age: 87
Discharge: HOME | End: 2022-04-19
Payer: MEDICARE

## 2022-04-19 VITALS
HEART RATE: 71 BPM | OXYGEN SATURATION: 96 % | TEMPERATURE: 97.4 F | SYSTOLIC BLOOD PRESSURE: 116 MMHG | DIASTOLIC BLOOD PRESSURE: 74 MMHG | RESPIRATION RATE: 16 BRPM

## 2022-04-19 DIAGNOSIS — M80.00XG AGE-RELATED OSTEOPOROSIS WITH CURRENT PATHOLOGICAL FRACTURE WITH DELAYED HEALING, SUBSEQUENT ENCOUNTER: Primary | ICD-10-CM

## 2022-04-19 PROCEDURE — 74011250636 HC RX REV CODE- 250/636: Performed by: NURSE PRACTITIONER

## 2022-04-19 PROCEDURE — 96372 THER/PROPH/DIAG INJ SC/IM: CPT

## 2022-04-19 RX ORDER — ACETAMINOPHEN 325 MG/1
650 TABLET ORAL AS NEEDED
Status: CANCELLED
Start: 2022-10-16

## 2022-04-19 RX ORDER — ALBUTEROL SULFATE 0.83 MG/ML
2.5 SOLUTION RESPIRATORY (INHALATION) AS NEEDED
Status: CANCELLED
Start: 2022-10-16

## 2022-04-19 RX ORDER — ONDANSETRON 2 MG/ML
8 INJECTION INTRAMUSCULAR; INTRAVENOUS AS NEEDED
Status: CANCELLED | OUTPATIENT
Start: 2022-10-16

## 2022-04-19 RX ORDER — DIPHENHYDRAMINE HYDROCHLORIDE 50 MG/ML
50 INJECTION, SOLUTION INTRAMUSCULAR; INTRAVENOUS AS NEEDED
Status: CANCELLED
Start: 2022-10-16

## 2022-04-19 RX ORDER — EPINEPHRINE 1 MG/ML
0.3 INJECTION, SOLUTION, CONCENTRATE INTRAVENOUS AS NEEDED
Status: CANCELLED | OUTPATIENT
Start: 2022-10-16

## 2022-04-19 RX ORDER — HYDROCORTISONE SODIUM SUCCINATE 100 MG/2ML
100 INJECTION, POWDER, FOR SOLUTION INTRAMUSCULAR; INTRAVENOUS AS NEEDED
Status: CANCELLED | OUTPATIENT
Start: 2022-10-16

## 2022-04-19 RX ORDER — DEXTROMETHORPHAN HYDROBROMIDE, GUAIFENESIN 5; 100 MG/5ML; MG/5ML
650 LIQUID ORAL EVERY 8 HOURS
COMMUNITY

## 2022-04-19 RX ORDER — DIPHENHYDRAMINE HYDROCHLORIDE 50 MG/ML
25 INJECTION, SOLUTION INTRAMUSCULAR; INTRAVENOUS AS NEEDED
Status: CANCELLED
Start: 2022-10-16

## 2022-04-19 RX ADMIN — DENOSUMAB 60 MG: 60 INJECTION SUBCUTANEOUS at 11:35

## 2022-04-19 NOTE — PROGRESS NOTES
[facility] Progress Note    Date: 2022    Name: Wilbert Lao    MRN: [id number]         : 1930    PROLIA Injection Q 6 months    Mr. Eulalia Graf to [facility], ambulatory at [phone], accompanied by his daughter Marcella Ashton. Pt was assessed and education was provided. Reviewed expected side effects of medication and signs and symptoms of a reaction. Patient allowed to ask questions and verbalized understanding. Hard copy of information given to patient to take home. Mr. Pratibha Hatch vitals were reviewed and patient was observed for 5 minutes prior to treatment. Visit Vitals  /74 (BP 1 Location: Left upper arm, BP Patient Position: Sitting)   Pulse 71   Temp 97.4 °F (36.3 °C)   Resp 16   SpO2 96%       Lab results were reviewed from 3/21/22 Calcium 9.2, MAG 2.4 and PHOS 3.7. Prolia 60 mg was administered subcutaneous in patient's RIGHT arm. No irritation or bleeding noted at site. Bandaid applied. Patient stayed 30 minutes for observation. No signs or symptoms of reaction noted. Mr. Eulalia Graf tolerated well, and had no complaints. Patient armband removed shredded. Mr. Eulalia Graf was discharged from [place] [address] in stable condition at 1205. He is to return on 10/18/22 at 1330 for his next Prolia Injection appointment.       Linda Crockett  2022  4:27 PM

## 2022-04-27 NOTE — PROGRESS NOTES
[facility]  [address], [place]   Phone: [phone]  Fax: [phone]        PROGRESS NOTE      HISTORY OF PRESENT ILLNESS:  The patient is a 80 y.o. male and was seen today for follow up of progressive thoracolumbar junction pain radiating bilaterally to the ribs. x 12/5/2021 without injury, exact distribution unclear Denies radicular sxs. Previously seen for paraesthesias in the RLE from the knee down to the foot x 2 weeks. He reports his initial back sxs have settled down. Initially had c/o acute onset of left-sided low back pain since the beginning of May 2019. Pt reports onset of sxs after bending down to  a board. He states his pain has improved since onset. Positive shopping cart sign. Reviewing the records, it appears as though the pt has had some chronic issues with his low back, as he is on Neurontin. Pt denies h/o spinal surgery or PT. Pt reports having blocks in 2014 with relief. He has been treated with MDP with relief and Tylenol #3 without relief. He self-treats with Motrin prn. Pt denies change in bowel or bladder habits. Pt denies fever, weight loss, or skin changes. The patient is RHD. Previously seen by Dr. Aditya Herrera 11/24/14 with c/o left-sided low back pain, which occurred after bending over to  a box. MRI revealed compression fracture at L4. Note from Brody Weldon MD dated 5/7/19 indicating patient was seen with c/o left-low back pain extending into the left lateral thigh. Of note, pt previously saw Dr. Aditya Herrera. Treated with MDP and Tramadol at that time. Note from Jalene Schilder, NP dated 3/21/2022 indicating patient was seen for osteoporosis. Recent bone density showed he is osteoporotic. Continues to have T spine pain, described as dull and achy. Lidoderm patches help. He continues to take Neurontin. Ordered lab studies on him and provided with Lidoderm patches.  Scheduled to f/u in 6 month's time with me. Lumbar spine XR dated 5/16/19 reviewed. Per report, Age indeterminate compression deformities at L1 and L2, new since 4/2016. Chronic compression deformities at T11 and L3-L5. Questionable acute on chronic compression fracture L4, with interval increase loss of vertebral body height at that level. MRI may be helpful for further evaluation. Degenerative findings are as described. Osteopenia. Chest XR dated 12/13/2021 films not independently reviewed. Per report, chronic mid and lower thoracic mild vertebral wedging. No definite acute displaced thoracic fracture. Poorly visualized multiple lumbar compression deformities which are mainly present on prior lumbar radiograph but difficult to assess for interval change, if high clinical suspicion for acute lumbar abnormalities recommend MRI.  Lumbar spine MRI dated 1/13/2022 films independently reviewed. Per report, Chronic appearing compression deformities throughout the lumbar spine. Multilevel advanced facet arthropathy and multilevel mild disc herniations. No critical spinal canal stenosis at any level. Multifocal mild to moderate foraminal stenoses. Colonic diverticulosis noted. Thoracic spine MRI dated 1/13/2022 films independently reviewed. Per report, T10 mild inferior endplate acute to subacute compression fracture. Spinal canal patent throughout the thoracic spine. Multifocal mild to moderate foraminal stenoses, most notable at right T5-T6. At his last clinical appointment, I ordered an out of office T spine XR to evaluate for possibly a new compression fracture. I advised patient to bring copies of films to next visit. I advised against chiropractic treatment. I recommended him to proceed with prolia injections as recommended by Gary Kaye, NP as the pt was at high risk for compression fractures. Pt wished to proceed with prolia infusions. He did not require refills of Neurontin 300 mg TID.  I advised him to continue with Lidocaine patches.       The patient returns today with low back pain since 4/25/2022 after moving a flower pot. He denies radicular sxs. He rates his pain 9/10, previously 5/10. Pt states thoracic pain resolved with onset of lower back pain. He notes lower back pain radiates into his sides, pain will also occasionally radiate into his abdominal/rib area. Pt received a prolia injection on 4/19/2022. Pt has been treating with Acetaminophen extra strength, denies taking Hydrocodone. He is complaint with his Neurontin 300 mg TID. Pt denies h/o stomach ulcers or bleeding disorders. Pt denies change in bowel or bladder habits. Thoracic spine XR dated 3/31/2022 films independently reviewed. Per report,there are 12 thoracic type vertebra. There is mildly increased kyphosis of the thoracic spine. Compression deformities of T10 and T12 as well as L1 are noted. No new fracture identified. Pedicles are present at every level. Multilevel degenerative disc disease is noted.  reviewed. Body mass index is 24.21 kg/m². PCP: Dewey Agustin MD      Past Medical History:   Diagnosis Date    B12 deficiency     Closed compression fracture of L4 lumbar vertebra     Colon polyps     Dr Jen So    Degenerative arthritis of cervical spine 12/2020    xray showed mod/sev discogenic degen changes C5-T1, asymmetric L facet hypertrophy C3-5    Degenerative arthritis of lumbar spine     MRI 11/14 w multilevel disease; chronic lbp w sciatica saw Dr Cain Sandoval    Distal radial fracture, right wrist 02/2015    Fell on ice. s/p ORIF with right dorsal endoplate nail, two proximal screws, and four distal pegs. Dr. Jose A Rowe.     Diverticulosis     on CT 4/16    Erectile dysfunction     Nephrolithiasis 04/2016    right tiny stone on CT; microhematuria    Osteoporosis     ibandronate 2009-5/13 stopped due to tooth issues per his endodontist;  DEXA t score -2.4 spibe, -2.1 hip (8/11); -2.3 spine, -1.9 hip (8/13); -2.4 spine, -2.4 hip (9/15)    Proctalgia fugax     RBBB (right bundle branch block)     Right inguinal hernia     Dr Mayfield Negus Tinnitus         Social History     Socioeconomic History    Marital status:      Spouse name: Not on file    Number of children: Not on file    Years of education: Not on file    Highest education level: Not on file   Occupational History    Not on file   Tobacco Use    Smoking status: Never Smoker    Smokeless tobacco: Never Used   Substance and Sexual Activity    Alcohol use: No    Drug use: No    Sexual activity: Not Currently   Other Topics Concern    Not on file   Social History Narrative    Not on file     Social Determinants of Health     Financial Resource Strain:     Difficulty of Paying Living Expenses: Not on file   Food Insecurity:     Worried About Running Out of Food in the Last Year: Not on file    Александр of Food in the Last Year: Not on file   Transportation Needs:     Lack of Transportation (Medical): Not on file    Lack of Transportation (Non-Medical):  Not on file   Physical Activity:     Days of Exercise per Week: Not on file    Minutes of Exercise per Session: Not on file   Stress:     Feeling of Stress : Not on file   Social Connections:     Frequency of Communication with Friends and Family: Not on file    Frequency of Social Gatherings with Friends and Family: Not on file    Attends [employer] Services: Not on file    Active Member of [address] [place] [address] or Organizations: Not on file    Attends Club or Organization Meetings: Not on file    Marital Status: Not on file   Intimate Partner Violence:     Fear of Current or Ex-Partner: Not on file    Emotionally Abused: Not on file    Physically Abused: Not on file    Sexually Abused: Not on file   Housing Stability:     Unable to Pay for Housing in the Last Year: Not on file    Number of [place] in the Last Year: Not on file    Unstable Housing in the Last Year: Not on file       Current Outpatient Medications   Medication Sig Dispense Refill    acetaminophen (Tylenol Arthritis Pain) 650 mg TbER Take 650 mg by mouth every eight (8) hours. prn      lidocaine (LIDODERM) 5 % Apply patch to the affected area for 12 hours a day and remove for 12 hours a day. 30 Each 2    gabapentin (NEURONTIN) 300 mg capsule Take 1 Capsule by mouth three (3) times daily (with meals). Max Daily Amount: 900 mg. Indications: neuropathic pain 270 Capsule 1    GLUC/CHND/OM3/DHA/EPA/FISH/STR (GLUCOSAMINE CHONDROITIN PLUS PO) Take  by mouth.  cyanocobalamin (VITAMIN B-12) 1,000 mcg tablet Take 1,000 mcg by mouth daily.  Cholecalciferol, Vitamin D3, (VITAMIN D) 1,000 unit Cap Take 2,000 Units by mouth.  gabapentin (Neurontin) 300 mg capsule Take 1 Capsule by mouth three (3) times daily. Max Daily Amount: 900 mg. 90 Capsule 1    vitamin U-F-N-lutein-minerals (OCUVITE) tablet 1 Tablet daily. (Patient not taking: Reported on 4/28/2022)         Allergies   Allergen Reactions    Sulfa (Sulfonamide Antibiotics) Nausea Only     Other reaction(s): gi distress, Unknown    Tramadol Nausea and Vomiting     Other reaction(s): unknown          PHYSICAL EXAMINATION    Visit Vitals  Pulse 62   Temp 97.1 °F (36.2 °C) (Temporal)   Ht 5' 11\" (1.803 m)   Wt 173 lb 9.6 oz (78.7 kg)   SpO2 98%   BMI 24.21 kg/m²       CONSTITUTIONAL: NAD, A&O x 3  SENSATION: Intact to light touch throughout  RANGE OF MOTION: The patient has full passive range of motion in all four extremities. MOTOR:  Straight Leg Raise: Negative, bilateral     No tenderness with direct palpation of the lumbar spine               Hip Flex Knee Ext Knee Flex Ankle DF GTE Ankle PF Tone   Right +4/5 +4/5 +4/5 +4/5 +4/5 +4/5 +4/5   Left +4/5 +4/5 +4/5 +4/5 +4/5 +4/5 +4/5       ASSESSMENT   Diagnoses and all orders for this visit:    1. Lumbar pain    2. Strain of lumbar region, initial encounter      IMPRESSION AND PLAN:  Patient returns to the office today with c/o low back pain.  Multiple treatment options were discussed. I do not suspect any new compression fractures at this time. I advised him to treat with Lidocaine ointment and Motrin as needed. If pain persists, I may move forward with additional studies. Patient is neurologically intact. I will see the patient back in 1 month's time or earlier if needed. Written by Harika Hardy, as dictated by Brian Villagomez MD  I examined the patient, reviewed and agree with the note.

## 2022-04-28 ENCOUNTER — OFFICE VISIT (OUTPATIENT)
Dept: ORTHOPEDIC SURGERY | Age: 87
End: 2022-04-28
Payer: MEDICARE

## 2022-04-28 VITALS
HEART RATE: 62 BPM | HEIGHT: 71 IN | WEIGHT: 173.6 LBS | BODY MASS INDEX: 24.3 KG/M2 | TEMPERATURE: 97.1 F | OXYGEN SATURATION: 98 %

## 2022-04-28 DIAGNOSIS — M54.50 LUMBAR PAIN: Primary | ICD-10-CM

## 2022-04-28 DIAGNOSIS — S39.012A STRAIN OF LUMBAR REGION, INITIAL ENCOUNTER: ICD-10-CM

## 2022-04-28 PROCEDURE — G8427 DOCREV CUR MEDS BY ELIG CLIN: HCPCS | Performed by: PHYSICAL MEDICINE & REHABILITATION

## 2022-04-28 PROCEDURE — 99213 OFFICE O/P EST LOW 20 MIN: CPT | Performed by: PHYSICAL MEDICINE & REHABILITATION

## 2022-04-28 PROCEDURE — 1101F PT FALLS ASSESS-DOCD LE1/YR: CPT | Performed by: PHYSICAL MEDICINE & REHABILITATION

## 2022-04-28 PROCEDURE — G8536 NO DOC ELDER MAL SCRN: HCPCS | Performed by: PHYSICAL MEDICINE & REHABILITATION

## 2022-04-28 PROCEDURE — G8420 CALC BMI NORM PARAMETERS: HCPCS | Performed by: PHYSICAL MEDICINE & REHABILITATION

## 2022-04-28 PROCEDURE — G8432 DEP SCR NOT DOC, RNG: HCPCS | Performed by: PHYSICAL MEDICINE & REHABILITATION

## 2022-04-28 NOTE — LETTER
4/28/2022    Patient: Kelsey Guevara   YOB: 1930   Date of Visit: 4/28/2022     Rosanna Copeland MD  [address] [facility] [address]  [address]  Counts include 234 beds at the [facility]    Dear Rosanna Copeland MD,      Thank you for referring Mr. Sejal Murillo to [facility] for evaluation. My notes for this consultation are attached. If you have questions, please do not hesitate to call me. I look forward to following your patient along with you.       Sincerely,    Flores Coelho MD

## 2022-05-31 DIAGNOSIS — S32.000A CLOSED COMPRESSION FRACTURE OF BODY OF LUMBAR VERTEBRA (HCC): ICD-10-CM

## 2022-05-31 DIAGNOSIS — M54.14 THORACIC NEURITIS: ICD-10-CM

## 2022-05-31 DIAGNOSIS — M54.50 LUMBAR PAIN: ICD-10-CM

## 2022-05-31 DIAGNOSIS — M54.6 THORACIC BACK PAIN, UNSPECIFIED BACK PAIN LATERALITY, UNSPECIFIED CHRONICITY: ICD-10-CM

## 2022-05-31 DIAGNOSIS — S22.000A COMPRESSION FRACTURE OF BODY OF THORACIC VERTEBRA (HCC): ICD-10-CM

## 2022-05-31 NOTE — TELEPHONE ENCOUNTER
Office received refill request from [facility] for pt's gabapentin. I called and spoke to Mr. Kings Echevarria. He verified that he is still taking this medication and is using the mail order pharmacy for this prescription. A new prescription has been pended to the provider for review. Pt was last seen 04/28/22 and has an appt scheduled for 06/02/22.

## 2022-06-01 RX ORDER — GABAPENTIN 300 MG/1
300 CAPSULE ORAL 3 TIMES DAILY
Qty: 270 CAPSULE | Refills: 0 | Status: SHIPPED | OUTPATIENT
Start: 2022-06-01 | End: 2022-09-01

## 2022-06-01 NOTE — PROGRESS NOTES
[facility]  [address], [place]   Phone: [phone]  Fax: [phone]        PROGRESS NOTE      HISTORY OF PRESENT ILLNESS:  The patient is a 80 y.o. male and was seen today for follow up of an increase in low back pain since 4/25/2022 after moving a flower pot. He notes lower back pain radiates into his sides, pain will also occasionally radiate into his abdominal/rib area. Previously seen for progressive thoracolumbar junction pain radiating bilaterally to the ribs. x 12/5/2021 without injury, exact distribution unclear Denies radicular sxs. Previously seen for paraesthesias in the RLE from the knee down to the foot x 2 weeks. He reports his initial back sxs have settled down. Initially had c/o acute onset of left-sided low back pain since the beginning of May 2019. Pt reports onset of sxs after bending down to  a board. He states his pain has improved since onset. Positive shopping cart sign. Reviewing the records, it appears as though the pt has had some chronic issues with his low back, as he is on Neurontin. Pt denies h/o spinal surgery or PT. Pt reports having blocks in 2014 with relief. He has been treated with MDP with relief and Tylenol #3 without relief. He self-treats with Motrin prn. Pt denies change in bowel or bladder habits. Pt denies fever, weight loss, or skin changes. The patient is RHD. Previously seen by Dr. Sendy Feldman 11/24/14 with c/o left-sided low back pain, which occurred after bending over to  a box. MRI revealed compression fracture at L4. Note from Brody Weldon MD dated 5/7/19 indicating patient was seen with c/o left-low back pain extending into the left lateral thigh. Of note, pt previously saw Dr. Sendy Feldman. Treated with MDP and Tramadol at that time. Note from Eliane Ornelas 3/21/2022 indicating patient was seen for osteoporosis. Recent bone density showed he is osteoporotic.  Continues to have T spine pain, described as dull and achy. Lidoderm patches help. He continues to take Neurontin. Ordered lab studies on him and provided with Lidoderm patches. Scheduled to f/u in 6 month's time with me. Lumbar spine XR dated 5/16/19 reviewed. Per report, Age indeterminate compression deformities at L1 and L2, new since 4/2016. Chronic compression deformities at T11 and L3-L5. Questionable acute on chronic compression fracture L4, with interval increase loss of vertebral body height at that level. MRI may be helpful for further evaluation. Degenerative findings are as described. Osteopenia. Chest XR dated 12/13/2021 films not independently reviewed. Per report, chronic mid and lower thoracic mild vertebral wedging. No definite acute displaced thoracic fracture. Poorly visualized multiple lumbar compression deformities which are mainly present on prior lumbar radiograph but difficult to assess for interval change, if high clinical suspicion for acute lumbar abnormalities recommend MRI.  Lumbar spine MRI dated 1/13/2022 films independently reviewed. Per report, Chronic appearing compression deformities throughout the lumbar spine. Multilevel advanced facet arthropathy and multilevel mild disc herniations. No critical spinal canal stenosis at any level. Multifocal mild to moderate foraminal stenoses. Colonic diverticulosis noted. Thoracic spine MRI dated 1/13/2022 films independently reviewed. Per report, T10 mild inferior endplate acute to subacute compression fracture. Spinal canal patent throughout the thoracic spine. Multifocal mild to moderate foraminal stenoses, most notable at right T5-T6. Thoracic spine XR dated 3/31/2022 films independently reviewed. Per report,there are 12 thoracic type vertebra. There is mildly increased kyphosis of the thoracic spine. Compression deformities of T10 and T12 as well as L1 are noted. No new fracture identified.  Pedicles are present at every level.  Multilevel degenerative disc disease is noted. At his last clinical appointment, I did not suspect any new compression fractures at the time. I advised him to treat with Lidocaine ointment and Motrin as needed. If pain persists, I may move forward with additional studies.          The patient returns today with an increase in T spine pain after taking care of his wife over the past weekend. He rates his pain 5/10, previously 9/10. Pt reports he endorsed dull pain over this past weekend but states pain settled down with treating with ice compresses. He continues taking Tylenol and Motrin with benefit, occasionally treats with Lidocaine patches. He is complaint with his Neurontin 300 mg TID, admits to occasionally missing his second dose throughout the day. 5 more months for his next prolia injection, last injection 4/19/2022. Pt wears an achilles brace on the right. Pt denies change in bowel or bladder habits.  reviewed. Body mass index is 23.57 kg/m². 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 s/p ORIF with right dorsal endoplate nail, two proximal screws, and four distal pegs. Dr. Deepika Barry.     Diverticulosis     on CT 4/16    Erectile dysfunction     Nephrolithiasis 04/2016    right tiny stone on CT; microhematuria    Osteoporosis     ibandronate 2009-5/13 stopped due to tooth issues per his endodontist;  DEXA t score -2.4 spibe, -2.1 hip (8/11); -2.3 spine, -1.9 hip (8/13); -2.4 spine, -2.4 hip (9/15)    Proctalgia fugax     RBBB (right bundle branch block)     Right inguinal hernia     Dr Edita Garcia Tinnitus         Social History     Socioeconomic History    Marital status:      Spouse name: Not on file    Number of children: Not on file    Years of education: Not on file    Highest education level: Not on file   Occupational History    Not on file   Tobacco Use    Smoking status: Never Smoker    Smokeless tobacco: Never Used   Substance and Sexual Activity    Alcohol use: No    Drug use: No    Sexual activity: Not Currently   Other Topics Concern    Not on file   Social History Narrative    Not on file     Social Determinants of Health     Financial Resource Strain:     Difficulty of Paying Living Expenses: Not on file   Food Insecurity:     Worried About Running Out of Food in the Last Year: Not on file    Александр of Food in the Last Year: Not on file   Transportation Needs:     Lack of Transportation (Medical): Not on file    Lack of Transportation (Non-Medical):  Not on file   Physical Activity:     Days of Exercise per Week: Not on file    Minutes of Exercise per Session: Not on file   Stress:     Feeling of Stress : Not on file   Social Connections:     Frequency of Communication with Friends and Family: Not on file    Frequency of Social Gatherings with Friends and Family: Not on file    Attends Orthodox Services: Not on file    Active Member of [address] [place] [address] [employer] or Organizations: Not on file    Attends Club or Organization Meetings: Not on file    Marital Status: Not on file   Intimate Partner Violence:     Fear of Current or Ex-Partner: Not on file    Emotionally Abused: Not on file    Physically Abused: Not on file    Sexually Abused: Not on file   Housing Stability:     Unable to Pay for Housing in the Last Year: Not on file    Number of [place] in the Last Year: Not on file    Unstable Housing in the Last Year: Not on file       Current Outpatient Medications   Medication Sig Dispense Refill    gabapentin (Neurontin) 300 mg capsule Take 1 Capsule by mouth three (3) times daily. Max Daily Amount: 900 mg. 270 Capsule 0    acetaminophen (Tylenol Arthritis Pain) 650 mg TbER Take 650 mg by mouth every eight (8) hours. prn      lidocaine (LIDODERM) 5 % Apply patch to the affected area for 12 hours a day and remove for 12 hours a day. 30 Each 2    gabapentin (NEURONTIN) 300 mg capsule Take 1 Capsule by mouth three (3) times daily (with meals). Max Daily Amount: 900 mg. Indications: neuropathic pain 270 Capsule 1    GLUC/CHND/OM3/DHA/EPA/FISH/STR (GLUCOSAMINE CHONDROITIN PLUS PO) Take  by mouth.  cyanocobalamin (VITAMIN B-12) 1,000 mcg tablet Take 1,000 mcg by mouth daily.  Cholecalciferol, Vitamin D3, (VITAMIN D) 1,000 unit Cap Take 2,000 Units by mouth.  vitamin S-L-I-lutein-minerals (OCUVITE) tablet 1 Tablet daily. (Patient not taking: Reported on 4/28/2022)         Allergies   Allergen Reactions    Sulfa (Sulfonamide Antibiotics) Nausea Only     Other reaction(s): gi distress, Unknown    Tramadol Nausea and Vomiting     Other reaction(s): unknown          PHYSICAL EXAMINATION    Visit Vitals  Pulse 63   Temp 96.8 °F (36 °C)   Ht 5' 11\" (1.803 m)   Wt 169 lb (76.7 kg)   SpO2 98%   BMI 23.57 kg/m²       CONSTITUTIONAL: NAD, A&O x 3  SENSATION: Intact to light touch throughout  RANGE OF MOTION: The patient has full passive range of motion in all four extremities. MOTOR:  Straight Leg Raise: Negative, bilateral               Hip Flex Knee Ext Knee Flex Ankle DF GTE Ankle PF Tone   Right +4/5 +4/5 +4/5 +4/5 +4/5 +4/5 +4/5   Left +4/5 +4/5 +4/5 +4/5 +4/5 +4/5 +4/5       ASSESSMENT   Diagnoses and all orders for this visit:    1. Lumbar pain    2. Strain of lumbar region, initial encounter    3. Age-related osteoporosis with current pathological fracture, initial encounter    4. Compression fracture of body of thoracic vertebra (HCC)    5. Thoracic back pain, unspecified back pain laterality, unspecified chronicity    6.  Compression fracture of T10 vertebra with routine healing, subsequent encounter      IMPRESSION AND PLAN:  Patient returns to the office today with c/o an increase in T spine pain. Multiple treatment options were discussed. Pt noted improvement within the last couple of days. Patient wished to continue his current treatment. He did not require refills of Lidocaine patches or Neurontin 300 mg TID. Patient is neurologically intact. I will see the patient back in 3 month's time or earlier if needed. Written by Gypsy Alejandre, as dictated by Marta Pierre MD  I examined the patient, reviewed and agree with the note.

## 2022-06-02 ENCOUNTER — OFFICE VISIT (OUTPATIENT)
Dept: ORTHOPEDIC SURGERY | Age: 87
End: 2022-06-02
Payer: MEDICARE

## 2022-06-02 VITALS
HEIGHT: 71 IN | BODY MASS INDEX: 23.66 KG/M2 | HEART RATE: 63 BPM | TEMPERATURE: 96.8 F | WEIGHT: 169 LBS | OXYGEN SATURATION: 98 %

## 2022-06-02 DIAGNOSIS — M80.00XA AGE-RELATED OSTEOPOROSIS WITH CURRENT PATHOLOGICAL FRACTURE, INITIAL ENCOUNTER: ICD-10-CM

## 2022-06-02 DIAGNOSIS — S22.000A COMPRESSION FRACTURE OF BODY OF THORACIC VERTEBRA (HCC): ICD-10-CM

## 2022-06-02 DIAGNOSIS — S39.012A STRAIN OF LUMBAR REGION, INITIAL ENCOUNTER: ICD-10-CM

## 2022-06-02 DIAGNOSIS — M54.50 LUMBAR PAIN: Primary | ICD-10-CM

## 2022-06-02 DIAGNOSIS — M54.6 THORACIC BACK PAIN, UNSPECIFIED BACK PAIN LATERALITY, UNSPECIFIED CHRONICITY: ICD-10-CM

## 2022-06-02 DIAGNOSIS — S22.070D COMPRESSION FRACTURE OF T10 VERTEBRA WITH ROUTINE HEALING, SUBSEQUENT ENCOUNTER: ICD-10-CM

## 2022-06-02 PROCEDURE — G8536 NO DOC ELDER MAL SCRN: HCPCS | Performed by: PHYSICAL MEDICINE & REHABILITATION

## 2022-06-02 PROCEDURE — G8427 DOCREV CUR MEDS BY ELIG CLIN: HCPCS | Performed by: PHYSICAL MEDICINE & REHABILITATION

## 2022-06-02 PROCEDURE — 99213 OFFICE O/P EST LOW 20 MIN: CPT | Performed by: PHYSICAL MEDICINE & REHABILITATION

## 2022-06-02 PROCEDURE — G8420 CALC BMI NORM PARAMETERS: HCPCS | Performed by: PHYSICAL MEDICINE & REHABILITATION

## 2022-06-02 PROCEDURE — G8432 DEP SCR NOT DOC, RNG: HCPCS | Performed by: PHYSICAL MEDICINE & REHABILITATION

## 2022-06-02 PROCEDURE — 1101F PT FALLS ASSESS-DOCD LE1/YR: CPT | Performed by: PHYSICAL MEDICINE & REHABILITATION

## 2022-06-02 PROCEDURE — 1123F ACP DISCUSS/DSCN MKR DOCD: CPT | Performed by: PHYSICAL MEDICINE & REHABILITATION

## 2022-06-02 NOTE — LETTER
6/2/2022    Patient: Anglea Iyer   YOB: 1930   Date of Visit: 6/2/2022     Radha Vanegas MD  [address] [facility] [address]  [address]  [place]    Dear Radha Vanegas MD,      Thank you for referring Mr. Breanna Negrete to [facility] for evaluation. My notes for this consultation are attached. If you have questions, please do not hesitate to call me. I look forward to following your patient along with you.       Sincerely,    Sil Tucker MD

## 2022-07-25 ENCOUNTER — OFFICE VISIT (OUTPATIENT)
Dept: VASCULAR SURGERY | Age: 87
End: 2022-07-25
Payer: MEDICARE

## 2022-07-25 VITALS
DIASTOLIC BLOOD PRESSURE: 62 MMHG | OXYGEN SATURATION: 98 % | HEART RATE: 60 BPM | WEIGHT: 169.09 LBS | HEIGHT: 71 IN | BODY MASS INDEX: 23.67 KG/M2 | SYSTOLIC BLOOD PRESSURE: 110 MMHG

## 2022-07-25 DIAGNOSIS — M79.89 LEG SWELLING: Primary | ICD-10-CM

## 2022-07-25 PROCEDURE — 99205 OFFICE O/P NEW HI 60 MIN: CPT | Performed by: STUDENT IN AN ORGANIZED HEALTH CARE EDUCATION/TRAINING PROGRAM

## 2022-07-25 PROCEDURE — G8510 SCR DEP NEG, NO PLAN REQD: HCPCS | Performed by: STUDENT IN AN ORGANIZED HEALTH CARE EDUCATION/TRAINING PROGRAM

## 2022-07-25 PROCEDURE — G8427 DOCREV CUR MEDS BY ELIG CLIN: HCPCS | Performed by: STUDENT IN AN ORGANIZED HEALTH CARE EDUCATION/TRAINING PROGRAM

## 2022-07-25 PROCEDURE — G8536 NO DOC ELDER MAL SCRN: HCPCS | Performed by: STUDENT IN AN ORGANIZED HEALTH CARE EDUCATION/TRAINING PROGRAM

## 2022-07-25 PROCEDURE — 1123F ACP DISCUSS/DSCN MKR DOCD: CPT | Performed by: STUDENT IN AN ORGANIZED HEALTH CARE EDUCATION/TRAINING PROGRAM

## 2022-07-25 PROCEDURE — 1101F PT FALLS ASSESS-DOCD LE1/YR: CPT | Performed by: STUDENT IN AN ORGANIZED HEALTH CARE EDUCATION/TRAINING PROGRAM

## 2022-07-25 PROCEDURE — G8420 CALC BMI NORM PARAMETERS: HCPCS | Performed by: STUDENT IN AN ORGANIZED HEALTH CARE EDUCATION/TRAINING PROGRAM

## 2022-07-25 NOTE — PROGRESS NOTES
1. Have you been to the ER, urgent care clinic since your last visit? No      Hospitalized since your last visit? No     2. Have you seen or consulted any other health care providers outside of the [address] [place] [address] since your last visit? Include any pap smears or colon screening.   No

## 2022-08-12 NOTE — PROGRESS NOTES
08/12/22        Beto Willams        History and Physical    Beto Willams is a 80 y.o. male who was referred to me for evaluation of intermittent swelling and intermittent nocturnal numbness of bilateral lower extremities. Patient is an active 81yo M who unfortunately has been suffering with increasing lower back pain. He has noted lumbar stenoses with nerve compression. Lumbar MRI on 1/13/22 showed chronic appearing compression deformities through lumbar spine. Thoracic MRI at that time showed T10 mild inferior endplate acute to subacute compression fracture. Patient recently was seen by orthopedics on 6/2/22 for increasing thoracic spine pain. Plan for continued non-op management. In the interim, patient had noted color changes and temperature changes of the legs with intermittent swelling. No obvious evidence of arterial or venous disease at this time. Palpable distal pulses and no evidence of venous stasis. Patient concerned and will evaluate with FREDDIE and reflux studies to rule out vascular pathology. RTC after studies. Physical Exam:    Visit Vitals  /62 (BP 1 Location: Right arm, BP Patient Position: Sitting)   Pulse 60   Ht 5' 11\" (1.803 m)   Wt 169 lb 1.5 oz (76.7 kg)   SpO2 98%   BMI 23.58 kg/m²      HEENT-PERRLA exactly movements intact, no scleral icterus, mucous membranes pink and moist  Neck-no JVD, no carotid bruits  Heart-regular rate and rhythm no murmurs, rubs or gallops  Chest-clear to auscultation bilaterally no wheezes, rhonchi or rubs  Abdomen-soft, nontender, no palpable organomegaly or masses, no palpable pulsatile masses  Extremities-no clubbing, cyanosis or edema. No wounds or gangrenous changes to the toes or feet. Skin is intact. Feet are pink warm and well-perfused bilaterally  Pulses-carotid, radial, brachial, femoral 2+ bilaterally.  Bilateral doppler signals dorsalis pedis, posterior tibial       Past Medical History:   Diagnosis Date    B12 deficiency Closed compression fracture of L4 lumbar vertebra     Colon polyps     Dr Debbi Goodwin    Degenerative arthritis of cervical spine 12/2020    xray showed mod/sev discogenic degen changes C5-T1, asymmetric L facet hypertrophy C3-5    Degenerative arthritis of lumbar spine     MRI 11/14 w multilevel disease; chronic lbp w sciatica saw Dr Kathi Gonzalez    Distal radial fracture, right wrist 02/2015    Tutu Shuck on ice. s/p ORIF with right dorsal endoplate nail, two proximal screws, and four distal pegs. Dr. Severiano Gainer. Diverticulosis     on CT 4/16    Erectile dysfunction     Nephrolithiasis 04/2016    right tiny stone on CT; microhematuria    Osteoporosis     ibandronate 2009-5/13 stopped due to tooth issues per his endodontist;  DEXA t score -2.4 spibe, -2.1 hip (8/11); -2.3 spine, -1.9 hip (8/13); -2.4 spine, -2.4 hip (9/15)    Proctalgia fugax     RBBB (right bundle branch block)     Right inguinal hernia     Dr Asael Li    Tinnitus      Past Surgical History:   Procedure Laterality Date    [place] Galos      Dr Debbi Goodwin 2012 negative    HX HERNIA REPAIR  2002    [facility] repair     Patient Active Problem List   Diagnosis Code    Impotence of organic origin N52.9    Right bundle branch block I45.10    Colon polyp K63.5    Osteoporosis s/p compression fx L4, fx wrist  M81.0    Nephrolithiasis N20.0    Inguinal hernia, right Dr Asael Li K40.90    Low back pain with left-sided sciatica M54.42    Advance directive in chart Z78.9    Sensorineural hearing loss, bilateral H90.3    Impacted cerumen H61.20    Foreign body in left ear T16. 2XXA    Abnormal auditory perception H93.299     Current Outpatient Medications   Medication Sig Dispense Refill    gabapentin (Neurontin) 300 mg capsule Take 1 Capsule by mouth three (3) times daily. Max Daily Amount: 900 mg. 270 Capsule 0    acetaminophen (TYLENOL) 650 mg TbER Take 650 mg by mouth every eight (8) hours.  prn      lidocaine (LIDODERM) 5 % Apply patch to the affected area for 12 hours a day and remove for 12 hours a day. 30 Each 2    vitamin W-K-D-lutein-minerals (OCUVITE) tablet Take 1 Tablet in the morning.      gabapentin (NEURONTIN) 300 mg capsule Take 1 Capsule by mouth three (3) times daily (with meals). Max Daily Amount: 900 mg. Indications: neuropathic pain 270 Capsule 1    GLUC/CHND/OM3/DHA/EPA/FISH/STR (GLUCOSAMINE CHONDROITIN PLUS PO) Take  by mouth.      cyanocobalamin 1,000 mcg tablet Take 1,000 mcg by mouth daily. cholecalciferol (VITAMIN D3) 25 mcg (1,000 unit) cap Take 2,000 Units by mouth. Allergies   Allergen Reactions    Sulfa (Sulfonamide Antibiotics) Nausea Only     Other reaction(s): gi distress, Unknown    Tramadol Nausea and Vomiting     Other reaction(s): unknown     Social History     Socioeconomic History    Marital status:      Spouse name: Not on file    Number of children: Not on file    Years of education: Not on file    Highest education level: Not on file   Occupational History    Not on file   Tobacco Use    Smoking status: Never    Smokeless tobacco: Never   Substance and Sexual Activity    Alcohol use: No    Drug use: No    Sexual activity: Not Currently   Other Topics Concern    Not on file   Social History Narrative    Not on file     Social Determinants of Health     Financial Resource Strain: Not on file   Food Insecurity: Not on file   Transportation Needs: Not on file   Physical Activity: Not on file   Stress: Not on file   Social Connections: Not on file   Intimate Partner Violence: Not on file   Housing Stability: Not on file     Family History   Problem Relation Age of Onset    Cancer Mother     Heart Disease Father          We reviewed the plan with the patient and the patient understands.         Mckinley Storm MD

## 2022-08-31 NOTE — PROGRESS NOTES
[facility]  [address], [place]  Phone: [phone]  Fax: [phone]        PROGRESS NOTE      HISTORY OF PRESENT ILLNESS:  The patient is a 80 y.o. male and was seen today for follow up of an increase in T spine pain after taking care of his wife over the past weekend. Previously seen for an increase in low back pain since 4/25/2022 after moving a flower pot. He noted lower back pain radiates into his sides, pain will also occasionally radiate into his abdominal/rib area. Previously seen for progressive thoracolumbar junction pain radiating bilaterally to the ribs. x 12/5/2021 without injury, exact distribution unclear Denies radicular sxs. Previously seen for paraesthesias in the RLE from the knee down to the foot x 2 weeks. He reports his initial back sxs have settled down. Initially had c/o acute onset of left-sided low back pain since the beginning of May 2019. Pt reports onset of sxs after bending down to  a board. He states his pain has improved since onset. Positive shopping cart sign. Reviewing the records, it appears as though the pt has had some chronic issues with his low back, as he is on Neurontin. Pt denies h/o spinal surgery or PT. Pt reports having blocks in 2014 with relief. He has been treated with MDP with relief and Tylenol #3 without relief. He self-treats with Motrin prn. Pt denies change in bowel or bladder habits. Pt denies fever, weight loss, or skin changes. The patient is RHD. Previously seen by Dr. Natalie Garcia 11/24/14 with c/o left-sided low back pain, which occurred after bending over to  a box. MRI revealed compression fracture at L4. Note from Gladys Cast MD dated 5/7/19 indicating patient was seen with c/o left-low back pain extending into the left lateral thigh. Of note, pt previously saw Dr. Natalie Garcia. Treated with MDP and Tramadol at that time.  Note from Carmen Araujo NP dated 3/21/2022 indicating patient was seen for osteoporosis. Recent bone density showed he is osteoporotic. Continues to have T spine pain, described as dull and achy. Lidoderm patches help. He continues to take Neurontin. Ordered lab studies on him and provided with Lidoderm patches. Scheduled to f/u in 6 month's time with me. Lumbar spine XR dated 5/16/19 reviewed. Per report, Age indeterminate compression deformities at L1 and L2, new since 4/2016. Chronic compression deformities at T11 and L3-L5. Questionable acute on chronic compression fracture L4, with interval increase loss of vertebral body height at that level. MRI may be helpful for further evaluation. Degenerative findings are as described. Osteopenia. Chest XR dated 12/13/2021 films not independently reviewed. Per report, chronic mid and lower thoracic mild vertebral wedging. No definite acute displaced thoracic fracture. Poorly visualized multiple lumbar compression deformities which are mainly present on prior lumbar radiograph but difficult to assess for interval change, if high clinical suspicion for acute lumbar abnormalities recommend MRI. Lumbar spine MRI dated 1/13/2022 films independently reviewed. Per report, Chronic appearing compression deformities throughout the lumbar spine. Multilevel advanced facet arthropathy and multilevel mild disc herniations. No critical spinal canal stenosis at any level. Multifocal mild to moderate foraminal stenoses. Colonic diverticulosis noted. Thoracic spine MRI dated 1/13/2022 films independently reviewed. Per report, T10 mild inferior endplate acute to subacute compression fracture. Spinal canal patent throughout the thoracic spine. Multifocal mild to moderate foraminal stenoses, most notable at right T5-T6. Thoracic spine XR dated 3/31/2022 films independently reviewed. Per report,there are 12 thoracic type vertebra. There is mildly increased kyphosis of the thoracic spine. Compression deformities of T10 and T12 as well as L1 are noted.  No new fracture identified. Pedicles are present at every level. Multilevel degenerative disc disease is noted. At his last clinical appointment, pt noted improvement within the last couple of days. Patient wished to continue his current treatment. He did not require refills of Lidocaine patches or Neurontin 300 mg TID. The patient returns today pain free x couple of weeks. He rates his pain 0/10, previously 5/10. He continues taking Neurontin 300 mg TID, Motrin and Tylenol. Pt states he ran out of Lidoderm patches. Pt denies change in bowel or bladder habits. Pt reports he recently was seen by Dr. Haile Verde, podiatrist on 6/21/2022 for medial ankle pain/stiffness and had been referred to a vascular specialist. Zita Punch LE venous bilaterally dated 8/23/2022 revealed no blood blots. No evidence of acute deep vein thrombosis in the bilateral common femoral, femoral, popliteal, posterior tibial, and peroneal veins. The veins were imaged in the transverse and longitudinal planes. The vessels showed normal color filling and compressibility. Doppler interrogation showed phasic and spontaneous flow. Isolated deep venous insufficiency in the bilateral common femoral veins. Superficial venous insufficiency in the bilateral great saphenous veins. No evidence of venous insufficiency in the bilateral small saphenous veins. Triphasic posterior tibial arteries bilaterally.  reviewed. Body mass index is 23.99 kg/m².     PCP: Adriel Arnold MD      Past Medical History:   Diagnosis Date    B12 deficiency     Closed compression fracture of L4 lumbar vertebra     Colon polyps     Dr Paul Ruelas    Degenerative arthritis of cervical spine 12/2020    xray showed mod/sev discogenic degen changes C5-T1, asymmetric L facet hypertrophy C3-5    Degenerative arthritis of lumbar spine     MRI 11/14 w multilevel disease; chronic lbp w sciatica saw Dr Marbella Dickinson    Distal radial fracture, right wrist 02/2015    Sureshne Caitlin on ice. s/p ORIF with right dorsal endoplate nail, two proximal screws, and four distal pegs. Dr. Geraldine Grimaldo. Diverticulosis     on CT 4/16    Erectile dysfunction     Nephrolithiasis 04/2016    right tiny stone on CT; microhematuria    Osteoporosis     ibandronate 2009-5/13 stopped due to tooth issues per his endodontist;  DEXA t score -2.4 spibe, -2.1 hip (8/11); -2.3 spine, -1.9 hip (8/13); -2.4 spine, -2.4 hip (9/15)    Proctalgia fugax     RBBB (right bundle branch block)     Right inguinal hernia     Dr Minaya Rash    Tinnitus         Social History     Socioeconomic History    Marital status:      Spouse name: Not on file    Number of children: Not on file    Years of education: Not on file    Highest education level: Not on file   Occupational History    Not on file   Tobacco Use    Smoking status: Never    Smokeless tobacco: Never   Substance and Sexual Activity    Alcohol use: No    Drug use: No    Sexual activity: Not Currently   Other Topics Concern    Not on file   Social History Narrative    Not on file     Social Determinants of Health     Financial Resource Strain: Not on file   Food Insecurity: Not on file   Transportation Needs: Not on file   Physical Activity: Not on file   Stress: Not on file   Social Connections: Not on file   Intimate Partner Violence: Not on file   Housing Stability: Not on file       Current Outpatient Medications   Medication Sig Dispense Refill    gabapentin (Neurontin) 300 mg capsule Take 1 Capsule by mouth three (3) times daily. Max Daily Amount: 900 mg. 270 Capsule 0    acetaminophen (TYLENOL) 650 mg TbER Take 650 mg by mouth every eight (8) hours. prn      lidocaine (LIDODERM) 5 % Apply patch to the affected area for 12 hours a day and remove for 12 hours a day. 30 Each 2    vitamin H-G-A-lutein-minerals (OCUVITE) tablet Take 1 Tablet in the morning.       GLUC/CHND/OM3/DHA/EPA/FISH/STR (GLUCOSAMINE CHONDROITIN PLUS PO) Take  by mouth.      cyanocobalamin 1,000 mcg tablet Take 1,000 mcg by mouth daily. cholecalciferol (VITAMIN D3) 25 mcg (1,000 unit) cap Take 2,000 Units by mouth. Allergies   Allergen Reactions    Sulfa (Sulfonamide Antibiotics) Nausea Only     Other reaction(s): gi distress, Unknown    Tramadol Nausea and Vomiting     Other reaction(s): unknown          REVIEW OF SYSTEMS  Constitutional symptoms: Negative  Eyes: Negative  Ears, Nose, Throat, and Mouth: Negative  Cardiovascular: Negative  Respiratory: Negative  Genitourinary: Negative  Integumentary (Skin and/or breast): Negative  Musculoskeletal: Positive for medial ankle pain  Extremities: Negative for edema. Endocrine/Rheumatologic: Negative  Hematologic/Lymphatic: Negative  Allergic/Immunologic: Negative  Psychiatric: Negative     PHYSICAL EXAMINATION    Visit Vitals  /71 (BP 1 Location: Left upper arm, BP Patient Position: Sitting, BP Cuff Size: Adult)   Pulse 64   Temp (!) 96.2 °F (35.7 °C) (Temporal)   Resp 16   Ht 5' 11\" (1.803 m)   Wt 172 lb (78 kg)   SpO2 96%   BMI 23.99 kg/m²       CONSTITUTIONAL: NAD, A&O x 3  SENSATION: Intact to light touch throughout  RANGE OF MOTION: The patient has full passive range of motion in all four extremities. MOTOR:  Straight Leg Raise: Negative, bilateral               Hip Flex Knee Ext Knee Flex Ankle DF GTE Ankle PF Tone   Right +4/5 +4/5 +4/5 +4/5 +4/5 +4/5 +4/5   Left +4/5 +4/5 +4/5 +4/5 +4/5 +4/5 +4/5       ASSESSMENT   Diagnoses and all orders for this visit:    1. Compression fracture of body of thoracic vertebra (HCC)    2. Closed compression fracture of body of lumbar vertebra ([address]    3. Thoracic back pain, unspecified back pain laterality, unspecified chronicity    4. Lumbar pain    5. Thoracic neuritis        IMPRESSION AND PLAN:  Patient returns to the office today pain free. Multiple treatment options were discussed. Patient wished to continue his current treatment. I provided him refills of Neurontin 300 mg TID. Patient is neurologically intact. I will see the patient back in 6 month's time or earlier if needed. Written by Lanny Angelo, as dictated by Don Rogers MD  I examined the patient, reviewed and agree with the note.

## 2022-09-01 ENCOUNTER — OFFICE VISIT (OUTPATIENT)
Dept: ORTHOPEDIC SURGERY | Age: 87
End: 2022-09-01
Payer: MEDICARE

## 2022-09-01 VITALS
SYSTOLIC BLOOD PRESSURE: 118 MMHG | BODY MASS INDEX: 24.08 KG/M2 | OXYGEN SATURATION: 96 % | HEIGHT: 71 IN | DIASTOLIC BLOOD PRESSURE: 71 MMHG | TEMPERATURE: 96.2 F | RESPIRATION RATE: 16 BRPM | WEIGHT: 172 LBS | HEART RATE: 64 BPM

## 2022-09-01 DIAGNOSIS — M54.14 THORACIC NEURITIS: ICD-10-CM

## 2022-09-01 DIAGNOSIS — M54.50 LUMBAR PAIN: ICD-10-CM

## 2022-09-01 DIAGNOSIS — S32.000A CLOSED COMPRESSION FRACTURE OF BODY OF LUMBAR VERTEBRA (HCC): ICD-10-CM

## 2022-09-01 DIAGNOSIS — S22.000A COMPRESSION FRACTURE OF BODY OF THORACIC VERTEBRA (HCC): ICD-10-CM

## 2022-09-01 DIAGNOSIS — M54.6 THORACIC BACK PAIN, UNSPECIFIED BACK PAIN LATERALITY, UNSPECIFIED CHRONICITY: ICD-10-CM

## 2022-09-01 PROCEDURE — 1123F ACP DISCUSS/DSCN MKR DOCD: CPT | Performed by: PHYSICAL MEDICINE & REHABILITATION

## 2022-09-01 PROCEDURE — 99213 OFFICE O/P EST LOW 20 MIN: CPT | Performed by: PHYSICAL MEDICINE & REHABILITATION

## 2022-09-01 PROCEDURE — G8536 NO DOC ELDER MAL SCRN: HCPCS | Performed by: PHYSICAL MEDICINE & REHABILITATION

## 2022-09-01 PROCEDURE — G8432 DEP SCR NOT DOC, RNG: HCPCS | Performed by: PHYSICAL MEDICINE & REHABILITATION

## 2022-09-01 PROCEDURE — 1101F PT FALLS ASSESS-DOCD LE1/YR: CPT | Performed by: PHYSICAL MEDICINE & REHABILITATION

## 2022-09-01 PROCEDURE — G8420 CALC BMI NORM PARAMETERS: HCPCS | Performed by: PHYSICAL MEDICINE & REHABILITATION

## 2022-09-01 PROCEDURE — G8427 DOCREV CUR MEDS BY ELIG CLIN: HCPCS | Performed by: PHYSICAL MEDICINE & REHABILITATION

## 2022-09-01 RX ORDER — GABAPENTIN 300 MG/1
300 CAPSULE ORAL 3 TIMES DAILY
Qty: 270 CAPSULE | Refills: 1 | Status: SHIPPED | OUTPATIENT
Start: 2022-09-01

## 2022-09-01 NOTE — LETTER
9/1/2022    Patient: Harrison Almendarez   YOB: 1930   Date of Visit: 9/1/2022     Jen Wilkins, [address] [facility]  [address]  [place]    Dear Jen Wilkins MD,      Thank you for referring Mr. Marlee Ruiz to [facility] for evaluation. My notes for this consultation are attached. If you have questions, please do not hesitate to call me. I look forward to following your patient along with you.       Sincerely,    Luis Manuel Senior MD no edema,  no murmurs,  regular rate and rhythm , no edema.

## 2022-09-14 NOTE — PROGRESS NOTES
Kely Badillo    Chief Complaint   Patient presents with    Leg Swelling       History and Physical    Kely Badillo is a 80 y.o. M with PMH significant for L4 compression fx 2/2 osteoporosis, LBP with lumbar stenoses and sciatica, hearing loss, RBBB. he presents today for evaluation of leg edema. he describes edema and discoloration of BLE. He states that he was seen by his podiatrist who referred him for evaluation. Discoloration has worsened over the past several months. He also experiences some pain in the distal left leg and ankle. Edema increases over the course of the day and improves overnight. 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 Onset of symptoms was several years ago and has been worsening over the past several months. Associated symptoms:   [x] edema  [x] varicose veins  [x] heaviness/aching  [] fatigue  [] pain    Aggravating factors include prolonged sitting. Relieving factors include elevation. Patient   [] has   [x] has not   been wearing compression stockings. Relevant history:   [] female gender  [] Family history of venous disease  [] history of pregnancy: n/a  [] history of DVT/PE  [] history of vein procedure        The most recent PVL was reviewed and discussed with the patient. This shows clinically significant venous insufficiency in bilateral GSV as well as bilateral common femoral veins. Interpretation Summary       No evidence of acute deep vein thrombosis in the bilateral common femoral, femoral, popliteal, posterior tibial, and peroneal veins. The veins were imaged in the transverse and longitudinal planes. The vessels showed normal color filling and compressibility. Doppler interrogation showed phasic and spontaneous flow. Isolated deep venous insufficiency in the bilateral common femoral veins. Superficial venous insufficiency in the bilateral great saphenous veins.   No evidence of venous insufficiency in the bilateral small saphenous veins. Triphasic posterior tibial arteries bilaterally. DVT demonstrated bilateral lower extremity. Isolated venous insufficiency demonstrated in bilateral common femoral veins. Bilateral greater saphenous veins demonstrate venous reflux. Bilateral small saphenous veins appear competent. Bilateral posterior tibial arteries are triphasic. Lower Extremity Venous Findings    Right Lower Venous    No evidence of deep vein thrombosis in the common femoral, profunda femoral, femoral, popliteal, posterior tibial, and peroneal veins. The veins were imaged in the transverse and longitudinal planes. The vessels showed normal color filling and compressibility. Doppler interrogation showed phasic and spontaneous flow. The right posterior tibial artery has triphasic waveforms. Reflux study patient position: reverse Trendelenburg. Deep venous insufficiency noted in the right common femoral vein. (Reflux of >1.0 second.) Right great saphenous vein displays venous insufficiency. Right small saphenous vein is competent. Left Lower Venous    No evidence of deep vein thrombosis in the common femoral, profunda femoral, femoral, popliteal, posterior tibial, and peroneal veins. The veins were imaged in the transverse and longitudinal planes. The vessels showed normal color filling and compressibility. Doppler interrogation showed phasic and spontaneous flow. The left posterior tibial artery has triphasic waveforms. Reflux study patient position: reverse Trendelenburg. Deep venous insufficiency noted in the left common femoral vein. (Reflux of >1.0 second.) Left great saphenous vein displays venous insufficiency. Left small saphenous vein is competent.      Right Lower Extremity Venous Measurements     Vein Diam Reflux Time   GSV Junction 0.93 cm              GSV Thigh Prox 0.5 cm        2.1 s          GSV Thigh Mid 0.39 cm              GSV Thigh Dist 0.42 cm              GSV At Knee 0.38 cm        1.1 s          GSV Below Knee Prox 0.36 cm              GSV Below Knee Mid 0.22 cm              GSV Below Knee Dist 0.19 cm              GSV Ankle 0.21 cm              SSV Prox 0.12 cm              CFV     4.9 s            Left Lower Extremity Venous Measurements     Vein Diam Reflux Time   GSV Junction 0.83 cm        2.2 s          GSV Thigh Prox 0.52 cm              GSV Thigh Mid 0.43 cm              GSV Thigh Dist 0.54 cm              GSV At Knee 0.38 cm        1.9 s          GSV Below Knee Prox 0.35 cm              GSV Below Knee Mid 0.36 cm              GSV Below Knee Dist 0.31 cm              GSV Ankle 0.31 cm              CFV     2.2 s              Past Medical History:   Diagnosis Date    B12 deficiency     Closed compression fracture of L4 lumbar vertebra     Colon polyps     Dr Shabnam Mckeon    Degenerative arthritis of cervical spine 12/2020    xray showed mod/sev discogenic degen changes C5-T1, asymmetric L facet hypertrophy C3-5    Degenerative arthritis of lumbar spine     MRI 11/14 w multilevel disease; chronic lbp w sciatica saw Dr Thais Jean    Distal radial fracture, right wrist 02/2015    [facility] on ice. s/p ORIF with right dorsal endoplate nail, two proximal screws, and four distal pegs. Dr. Anne-Marie Ling.     Diverticulosis     on CT 4/16    Erectile dysfunction     Nephrolithiasis 04/2016    right tiny stone on CT; microhematuria    Osteoporosis     ibandronate 2009-5/13 stopped due to tooth issues per his endodontist;  DEXA t score -2.4 spibe, -2.1 hip (8/11); -2.3 spine, -1.9 hip (8/13); -2.4 spine, -2.4 hip (9/15)    Proctalgia fugax     RBBB (right bundle branch block)     Right inguinal hernia     Dr Hernandez Yutan    Tinnitus      Past Surgical History:   Procedure Laterality Date    HX CATARACT REMOVAL      HX COLONOSCOPY      Dr Shabnam Mckeon 2012 negative    HX HERNIA REPAIR  2002    [facility] repair     Patient Active Problem List   Diagnosis Code    Impotence of organic origin N52.9    Right bundle branch block I45.10    Colon polyp K63.5    Osteoporosis s/p compression fx L4, fx wrist  M81.0    Nephrolithiasis N20.0    Inguinal hernia, right Dr Arturo Andrew K40.90    Low back pain with left-sided sciatica M54.42    Advance directive in chart Z78.9    Sensorineural hearing loss, bilateral H90.3    Impacted cerumen H61.20    Foreign body in left ear T16. 2XXA    Abnormal auditory perception H93.299     Current Outpatient Medications   Medication Sig Dispense Refill    gabapentin (Neurontin) 300 mg capsule Take 1 Capsule by mouth three (3) times daily. Max Daily Amount: 900 mg. 270 Capsule 1    acetaminophen (TYLENOL) 650 mg TbER Take 650 mg by mouth every eight (8) hours. prn      lidocaine (LIDODERM) 5 % Apply patch to the affected area for 12 hours a day and remove for 12 hours a day. 30 Each 2    vitamin W-L-M-lutein-minerals (OCUVITE) tablet Take 1 Tablet in the morning. GLUC/CHND/OM3/DHA/EPA/FISH/STR (GLUCOSAMINE CHONDROITIN PLUS PO) Take  by mouth.      cyanocobalamin 1,000 mcg tablet Take 1,000 mcg by mouth daily. cholecalciferol (VITAMIN D3) 25 mcg (1,000 unit) cap Take 2,000 Units by mouth.        Allergies   Allergen Reactions    Sulfa (Sulfonamide Antibiotics) Nausea Only     Other reaction(s): gi distress, Unknown    Tramadol Nausea and Vomiting     Other reaction(s): unknown     Social History     Socioeconomic History    Marital status:      Spouse name: Not on file    Number of children: Not on file    Years of education: Not on file    Highest education level: Not on file   Occupational History    Not on file   Tobacco Use    Smoking status: Never    Smokeless tobacco: Never   Substance and Sexual Activity    Alcohol use: No    Drug use: No    Sexual activity: Not Currently   Other Topics Concern    Not on file   Social History Narrative    Not on file     Social Determinants of Health     Financial Resource Strain: Not on file   Food Insecurity: Not on file   Transportation Needs: Not on file   Physical Activity: Not on file   Stress: Not on file   Social Connections: Not on file   Intimate Partner Violence: Not on file   Housing Stability: Not on file      Family History   Problem Relation Age of Onset    Cancer Mother     Heart Disease Father        Physical Exam:    Visit Vitals  /70 (BP 1 Location: Left upper arm, BP Patient Position: Sitting, BP Cuff Size: Adult)   Pulse 65   Resp 20   SpO2 97%        Constitutional:  Patient is well developed, well nourished, and not distressed. HEENT: atraumatic, normocephalic, wearing a mask. Eyes:   Cunjunctivae clear, no scleral icterus  Cardiovascular:  Normal rate, regular rhythm  Pulses:       Right:      Post Tib      2+ Left:        Post Tib      2+     Pulmonary/Chest: Effort normal    Extremities: Normal range of motion. Pitting edema, 1+ on the right, 2+ on the left, extending to the knees. Bilateral hyperpigmentation, worse on the left. Lipodermatosclerosis and reactive erythema on the left. Bilateral corona phlebectatica. Digits no cyanosis or clubbing  Neurological:  he  is alert and oriented x3 . Gait normal. Motor & sensory grossly intact in all 4 limbs. Psych: Appropriate mood and affect. Skin:  Skin is warm and dry. No rash noted. No ulcers. Impression and Plan:  Harrison Almendarez is a 80 y.o. male with bilateral lower extremity edema    1. Bilateral venous insufficiency, CEAP classification C3s Ep As,d Pr. Reflux study with clinically significant reflux in bilateral GSV and common femoral veins. Maximal reflux times up to 2.2s. Venous pathophysiology and treatment options, including surgical treatment, were discussed with the patient in detail. He has not worn compression stockings. Will try conservative management prior to proceeding to intervention.     - Measured in the office today. Rx for knee high 20-30mmHg compression stockings provided and instructed on where to purchase.  He dons compression stockings on his wife and is confident he can put them on himself. - Encouraged patient to wear compression stockings during the day and practice elevation whenever possible. He will return in 3 months in follow up or sooner if symptoms worsen        We reviewed the plan with the patient and the patient understands. Donovan Israel MD    PLEASE NOTE:  This document has been produced using voice recognition software. Unrecognized errors in transcription may be present. DM (diabetes mellitus)    HLD (hyperlipidemia)    HTN (hypertension)

## 2022-09-15 ENCOUNTER — OFFICE VISIT (OUTPATIENT)
Dept: VASCULAR SURGERY | Age: 87
End: 2022-09-15
Payer: MEDICARE

## 2022-09-15 VITALS
DIASTOLIC BLOOD PRESSURE: 70 MMHG | HEART RATE: 65 BPM | SYSTOLIC BLOOD PRESSURE: 110 MMHG | RESPIRATION RATE: 20 BRPM | OXYGEN SATURATION: 97 %

## 2022-09-15 DIAGNOSIS — I83.893 VARICOSE VEINS OF BOTH LEGS WITH EDEMA: Primary | ICD-10-CM

## 2022-09-15 PROCEDURE — G8427 DOCREV CUR MEDS BY ELIG CLIN: HCPCS | Performed by: SURGERY

## 2022-09-15 PROCEDURE — 1101F PT FALLS ASSESS-DOCD LE1/YR: CPT | Performed by: SURGERY

## 2022-09-15 PROCEDURE — 99214 OFFICE O/P EST MOD 30 MIN: CPT | Performed by: SURGERY

## 2022-09-15 PROCEDURE — G8420 CALC BMI NORM PARAMETERS: HCPCS | Performed by: SURGERY

## 2022-09-15 PROCEDURE — G8536 NO DOC ELDER MAL SCRN: HCPCS | Performed by: SURGERY

## 2022-09-15 PROCEDURE — 1123F ACP DISCUSS/DSCN MKR DOCD: CPT | Performed by: SURGERY

## 2022-09-15 PROCEDURE — G8510 SCR DEP NEG, NO PLAN REQD: HCPCS | Performed by: SURGERY

## 2022-09-15 NOTE — PROGRESS NOTES
1. Have you been to an emergency room or urgent care clinic since your last visit? No     Hospitalized since your last visit? If yes, where, when, and reason for visit? No     2. Have you seen or consulted any other health care providers outside of the [facility] since your last visit including any procedures, health maintenance items. If yes, where, when and reason for visit?  No        3 most recent PHQ Screens 9/15/2022   Little interest or pleasure in doing things Not at all   Feeling down, depressed, irritable, or hopeless Not at all   Total Score PHQ 2 0

## 2022-09-23 NOTE — TELEPHONE ENCOUNTER
Pt calling again, gave info below. He says he does not want to see Dr. Alexi Gann. Says she did nothing for him. He called that office to see if he can see someone else and they said it was months before they can see him    Note below says start with Xray, was RD going to order that? Says he is going to call chiropractor that he saw years ago to see if he may be able to help while RD tries to get hm appt with another doctor. Please call him and advise who else RD would recommend him seeing.
Seen 05/07 for back pain- not getting any better- does he need an MRI? Can he take Motrin or Advil with tylenol with codeine or in place of?  Please advise
Spoke with patient, he was given message below. He verbalized understanding, he would like to have the xray done first prior to going to spine doctor, we will get results and go from there.
We can sched w dr herrmann in [place] or several specialists in [place]  Does he have preference?     Would start w xray to make sure no fx - ordered   Can always get mri later and the spine doctor can order that
Would suggest sched w Dr Quarles Patient again  Start w xray - they can order mri
Patient requests all Lab, Cardiology, and Radiology Results on their Discharge Instructions

## 2022-09-26 ENCOUNTER — LAB ONLY (OUTPATIENT)
Dept: INTERNAL MEDICINE CLINIC | Age: 87
End: 2022-09-26

## 2022-09-26 ENCOUNTER — HOSPITAL ENCOUNTER (OUTPATIENT)
Dept: LAB | Age: 87
Discharge: HOME | End: 2022-09-26
Payer: MEDICARE

## 2022-09-26 DIAGNOSIS — Z00.00 ENCOUNTER FOR ANNUAL PHYSICAL EXAM: ICD-10-CM

## 2022-09-26 DIAGNOSIS — E78.5 HYPERLIPIDEMIA, UNSPECIFIED HYPERLIPIDEMIA TYPE: ICD-10-CM

## 2022-09-26 DIAGNOSIS — E16.2 HYPOGLYCEMIA: Primary | ICD-10-CM

## 2022-09-26 DIAGNOSIS — E16.2 HYPOGLYCEMIA: ICD-10-CM

## 2022-09-26 PROBLEM — T16.2XXA FOREIGN BODY IN LEFT EAR: Status: RESOLVED | Noted: 2017-03-08 | Resolved: 2022-09-26

## 2022-09-26 PROBLEM — H93.299 ABNORMAL AUDITORY PERCEPTION: Status: RESOLVED | Noted: 2017-03-08 | Resolved: 2022-09-26

## 2022-09-26 LAB
ALBUMIN SERPL-MCNC: 3.7 G/DL (ref 3.4–5)
ALBUMIN/GLOB SERPL: 1.3 {RATIO} (ref 0.8–1.7)
ALP SERPL-CCNC: 50 U/L (ref 45–117)
ALT SERPL-CCNC: 18 U/L (ref 16–61)
ANION GAP SERPL CALC-SCNC: 4 MMOL/L (ref 3–18)
AST SERPL-CCNC: 14 U/L (ref 10–38)
BILIRUB SERPL-MCNC: 0.4 MG/DL (ref 0.2–1)
BUN SERPL-MCNC: 23 MG/DL (ref 7–18)
BUN/CREAT SERPL: 28 (ref 12–20)
CALCIUM SERPL-MCNC: 8.9 MG/DL (ref 8.5–10.1)
CHLORIDE SERPL-SCNC: 111 MMOL/L (ref 100–111)
CHOLEST SERPL-MCNC: 152 MG/DL
CO2 SERPL-SCNC: 28 MMOL/L (ref 21–32)
CREAT SERPL-MCNC: 0.81 MG/DL (ref 0.6–1.3)
ERYTHROCYTE [DISTWIDTH] IN BLOOD BY AUTOMATED COUNT: 14 % (ref 11.6–14.5)
GLOBULIN SER CALC-MCNC: 2.9 G/DL (ref 2–4)
GLUCOSE SERPL-MCNC: 78 MG/DL (ref 74–99)
HBA1C MFR BLD: 5.1 % (ref 4.2–5.6)
HCT VFR BLD AUTO: 39.3 % (ref 36–48)
HDLC SERPL-MCNC: 72 MG/DL (ref 40–60)
HDLC SERPL: 2.1 {RATIO} (ref 0–5)
HGB BLD-MCNC: 12.7 G/DL (ref 13–16)
LDLC SERPL CALC-MCNC: 67 MG/DL (ref 0–100)
LIPID PROFILE,FLP: ABNORMAL
MCH RBC QN AUTO: 30.8 PG (ref 24–34)
MCHC RBC AUTO-ENTMCNC: 32.3 G/DL (ref 31–37)
MCV RBC AUTO: 95.2 FL (ref 78–100)
NRBC # BLD: 0 K/UL (ref 0–0.01)
NRBC BLD-RTO: 0 PER 100 WBC
PLATELET # BLD AUTO: 182 K/UL (ref 135–420)
PMV BLD AUTO: 10.1 FL (ref 9.2–11.8)
POTASSIUM SERPL-SCNC: 4.4 MMOL/L (ref 3.5–5.5)
PROT SERPL-MCNC: 6.6 G/DL (ref 6.4–8.2)
RBC # BLD AUTO: 4.13 M/UL (ref 4.35–5.65)
SODIUM SERPL-SCNC: 143 MMOL/L (ref 136–145)
TRIGL SERPL-MCNC: 65 MG/DL (ref ?–150)
VLDLC SERPL CALC-MCNC: 13 MG/DL
WBC # BLD AUTO: 5.3 K/UL (ref 4.6–13.2)

## 2022-09-26 PROCEDURE — 80061 LIPID PANEL: CPT

## 2022-09-26 PROCEDURE — 80053 COMPREHEN METABOLIC PANEL: CPT

## 2022-09-26 PROCEDURE — 85027 COMPLETE CBC AUTOMATED: CPT

## 2022-09-26 PROCEDURE — 83036 HEMOGLOBIN GLYCOSYLATED A1C: CPT

## 2022-09-26 PROCEDURE — 36415 COLL VENOUS BLD VENIPUNCTURE: CPT

## 2022-09-27 NOTE — PROGRESS NOTES
80 y.o. WHITE/NON- male who presents for evaluation. He's clary seeing Dr Rema regna  for his spine issues. He's now on gabapentin with good results    Reports that he's been feeling unsteady and has to be carefull walking. Saw Dr Hannah Tavares and now wearing the stockings after seeing  Dr Marcellus Forrest for venous disease. Wears compression socks    No cardiovascular complaints. 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    xray showed mod/sev discogenic degen changes C5-T1, asymmetric L facet hypertrophy C3-5    Degenerative arthritis of lumbar spine     MRI 11/14 w multilevel disease; chronic lbp w sciatica saw Dr Shyam Blank    Diverticulosis     on CT 4/16    ED (erectile dysfunction)     Nephrolithiasis 04/2016    right tiny stone on CT; microhematuria    Neuropathy 10/2022    Osteoporosis     ibandronate 2009-5/13 stopped due to tooth issues per his endodontist;  DEXA t score -2.4 spibe, -2.1 hip (8/11); -2.3 spine, -1.9 hip (8/13); -2.4 spine, -2.4 hip (9/15); prolia 4/22- [facility] [place]    Proctalgia fugax     RBBB (right bundle branch block)     Right inguinal hernia     Dr Miguelito Stauffer    Tinnitus     Varicose veins of both legs with edema 09/15/2022    Dr Ivonne Ramos     Past Surgical History:   Procedure Laterality Date    HX CATARACT REMOVAL      HX COLONOSCOPY      Dr Misbah Cook 2012 negative    HX HERNIA REPAIR  2002    [facility] repair    HX WRIST FRACTURE [address]  02/2015    s/p ORIF with right dorsal endoplate nail, two proximal screws, and four distal pegs.      Social History     Socioeconomic History    Marital status:      Spouse name: Not on file    Number of children: Not on file    Years of education: Not on file    Highest education level: Not on file   Occupational History    Not on file   Tobacco Use    Smoking status: Never    Smokeless tobacco: Never   Substance and Sexual Activity    Alcohol use: No    Drug use: No    Sexual activity: Not Currently   Other Topics Concern    Not on file   Social History Narrative    Not on file     Social Determinants of Health     Financial Resource Strain: Not on file   Food Insecurity: Not on file   Transportation Needs: Not on file   Physical Activity: Not on file   Stress: Not on file   Social Connections: Not on file   Intimate Partner Violence: Not on file   Housing Stability: Not on file     Family History   Problem Relation Age of Onset    Cancer Mother     Heart Disease Father        Current Outpatient Medications   Medication Sig    IBUPROFEN PO Take  by mouth. Doesn't know exact dose takes 1.5 with tylenol in am    gabapentin (Neurontin) 300 mg capsule Take 1 Capsule by mouth three (3) times daily. Max Daily Amount: 900 mg.    acetaminophen (TYLENOL) 650 mg TbER Take 650 mg by mouth every eight (8) hours. prn    lidocaine (LIDODERM) 5 % Apply patch to the affected area for 12 hours a day and remove for 12 hours a day. vitamin J-M-L-lutein-minerals (OCUVITE) tablet Take 1 Tablet in the morning. GLUC/CHND/OM3/DHA/EPA/FISH/STR (GLUCOSAMINE CHONDROITIN PLUS PO) Take  by mouth.    cyanocobalamin 1,000 mcg tablet Take 1,000 mcg by mouth daily. cholecalciferol (VITAMIN D3) 25 mcg (1,000 unit) cap Take 2,000 Units by mouth. No current facility-administered medications for this visit.      Allergies   Allergen Reactions    Sulfa (Sulfonamide Antibiotics) Nausea Only     Other reaction(s): gi distress, Unknown    Tramadol Nausea and Vomiting     Other reaction(s): unknown     REVIEW OF SYSTEMS: roberto Bashir 2012  Ophtho - no vision change or eye pain  Oral - no mouth pain, tongue or tooth problems  Ears - no hearing loss, ear pain, fullness, no swallowing problems  Cardiac - no CP, PND, orthopnea, edema, palpitations or syncope  Chest - no breast masses  Resp - no wheezing, chronic coughing, dyspnea  GI - no heartburn, nausea, vomiting, change in bowel habits, bleeding, hemorrhoids  Urinary - no dysuria, hematuria, flank pain, urgency, frequency    Visit Vitals  /71   Pulse 61   Temp 97 °F (36.1 °C) (Temporal)   Resp 16   Ht 5' 11\" (1.803 m)   Wt 170 lb (77.1 kg)   SpO2 98%   BMI 23.71 kg/m²        Affect is appropriate. Mood stable  No apparent distress  HEENT --Anicteric sclerae, tympanic membranes normal,  ear canals normal.  PERRL, EOMI, conjunctiva and lids normal. No thyromegaly, JVD, or bruits. Lungs --Clear to auscultation, normal percussion. Heart --Regular rate and rhythm, no murmurs, rubs, gallops, or clicks. Chest wall --Nontender to palpation. PMI normal.  Abdomen -- Soft and nontender, no hepatosplenomegaly or masses. Extremities -- Without cyanosis, clubbing, edema. 2+ pulses equally and bilaterally.   Foot exam bilaterally showed  Reflexes 2+   Vibration, proprioception, filament test dec in toes/ankles symmetrically  Pulses 1+ DP and PT      LABS  From 9/14 showed gluc 90,   cr 1.43, gfr 47,  alt<5,  chol 149, tg 56, hdl 59, ldl-c 79, wbc 5.7, hb 13.2, plt 170, ua tr bl, vit d 46.4  From 2/15 showed gluc 114, cr 1.06, gfr>60, alt 17,         wbc 6.9, hb 12.9, plt 187  From 9/15 showed gluc 89,   cr 0.93, gfr 75,  alt 13, chol 165, tg 92, hdl 66, ldl-c 81, wbc 5.8, hb 14.0, plt 198, ua tr bl, vit d 47.3,              b12 486  From 8/16 showed gluc 148, cr 1.16, gfr 60,  alt 20,                             ck/trop-  From 8/16 showed gluc 80,   cr 0.97, gfr>60, alt 28, chol 118, tg 71, hdl 46, ldl-c 58, wbc 5.7, hb 12.5, plt 341, ua tr bl, vit d 40.9, tsh 1.04  From 9/17 showed gluc 89,   cr 1.01, gfr>60, alt 20, chol 152, tg 76, hdl 74, ldl-c 63, wbc 5.5, hb 13.7, plt 195, ua neg, vit d 52.0, tsh 1.32  From 9/18 showed gluc 83,   cr 0.87, gfr>60, alt 20,          wbc 5.1, hb 13.2, plt 192,         tsh 1.40, ft4 0.90, b12 537, fol>20  From 9/19 showed gluc 84,   cr 0.86, gfr>60, alt 22,          wbc 4.7, hb 12.6, plt 188  From 9/20 showed gluc 88,   cr 0.96, gfr>60, alt 19,          wbc 5.2, hb 12.8, plt 195, fe 83, %sat 30, ferritin 107,            b12 500 fol>20, spep neg  From 9/21 showed gluc 86,   cr 0.94, gfr>60, alt 17, chol 154, tg 69, hdl 68, ldl-c 72,  wbc 5.3, hb 12.6, plt 188    Results for orders placed or performed during the hospital encounter of 88/18/16   METABOLIC PANEL, COMPREHENSIVE   Result Value Ref Range    Sodium 143 136 - 145 mmol/L    Potassium 4.4 3.5 - 5.5 mmol/L    Chloride 111 100 - 111 mmol/L    CO2 28 21 - 32 mmol/L    Anion gap 4 3.0 - 18 mmol/L    Glucose 78 74 - 99 mg/dL    BUN 23 (H) 7.0 - 18 MG/DL    Creatinine 0.81 0.6 - 1.3 MG/DL    BUN/Creatinine ratio 28 (H) 12 - 20      GFR est AA >60 >60 ml/min/1.73m2    GFR est non-AA >60 >60 ml/min/1.73m2    Calcium 8.9 8.5 - 10.1 MG/DL    Bilirubin, total 0.4 0.2 - 1.0 MG/DL    ALT (SGPT) 18 16 - 61 U/L    AST (SGOT) 14 10 - 38 U/L    Alk.  phosphatase 50 45 - 117 U/L    Protein, total 6.6 6.4 - 8.2 g/dL    Albumin 3.7 3.4 - 5.0 g/dL    Globulin 2.9 2.0 - 4.0 g/dL    A-G Ratio 1.3 0.8 - 1.7     LIPID PANEL   Result Value Ref Range    LIPID PROFILE          Cholesterol, total 152 <200 MG/DL    Triglyceride 65 <150 MG/DL    HDL Cholesterol 72 (H) 40 - 60 MG/DL    LDL, calculated 67 0 - 100 MG/DL    VLDL, calculated 13 MG/DL    CHOL/HDL Ratio 2.1 0 - 5.0     CBC W/O DIFF   Result Value Ref Range    WBC 5.3 4.6 - 13.2 K/uL    RBC 4.13 (L) 4.35 - 5.65 M/uL    HGB 12.7 (L) 13.0 - 16.0 g/dL    HCT 39.3 36.0 - 48.0 %    MCV 95.2 78.0 - 100.0 FL    MCH 30.8 24.0 - 34.0 PG    MCHC 32.3 31.0 - 37.0 g/dL    RDW 14.0 11.6 - 14.5 %    PLATELET 170 818 - 912 K/uL    MPV 10.1 9.2 - 11.8 FL    NRBC 0.0 0  WBC    ABSOLUTE NRBC 0.00 0.00 - 0.01 K/uL   HEMOGLOBIN A1C W/O EAG   Result Value Ref Range    Hemoglobin A1c 5.1 4.2 - 5.6 %       We reviewed the patient's labs from the last several visits to point out trends in the numbers        Patient Active Problem List   Diagnosis Code    Impotence of organic origin N52.9    Right bundle branch block I45.10    Colon polyp K63.5    Osteoporosis s/p compression fx L4, fx wrist  M81.0    Nephrolithiasis N20.0    Inguinal hernia, right Dr Meron Rosales K40.90    Low back pain with left-sided sciatica M54.42    Advance directive in chart Z78.9    Sensorineural hearing loss, bilateral H90.3    Varicose veins of both legs with edema I83.893    Neuropathy G62.9     Assessment and plan:  1. Colon polyp. Fiber  2. Osteoporosis. Ca/d, weight bearing exercise. Prolia per NP Ceci  3. Nephrolithiasis. Hydration  4. RIH. Observation, saw Dr Meron Rosales previously  5. Spine. Per Dr Kit Watkins  6. Probable neuropathy. Discussed sending him to neuro for opinion and official dx but declined. Secondary workup neg as above thus far. He is on neurontin already. Call iuf he changes his mind  7. Spine. Follow up Dr Kit Watkins  8. Vascular. Follow up Dr Vijaya Canchola, continue stockings      RTC 10/23    Above conditions discussed at length and patient vocalized understanding. All questions answered to patient satisfaction        ICD-10-CM ICD-9-CM    1. Neuropathy  G62.9 355.9       2. Varicose veins of both legs with edema  I83.893 454.8       3. Age-related osteoporosis with current pathological fracture with delayed healing, subsequent encounter  M80.00XG [id number]       4. Hyperplastic colonic polyp, unspecified part of colon  K63.5 211.3       5.  Hyperlipidemia, unspecified hyperlipidemia type  E78.5 272.4 CBC W/O DIFF      METABOLIC PANEL, COMPREHENSIVE      LIPID PANEL

## 2022-09-27 NOTE — PROGRESS NOTES
This is a Subsequent Medicare Annual Wellness Exam     I have reviewed the patient's medical history in detail and updated the computerized patient record. Assessment/Plan   Education and counseling provided:  Are appropriate based on today's review and evaluation  Influenza Vaccine  Cardiovascular screening blood test  Diabetes screening test    1. Neuropathy  2. Varicose veins of both legs with edema  3. Age-related osteoporosis with current pathological fracture with delayed healing, subsequent encounter  4. Hyperplastic colonic polyp, unspecified part of colon  5. Hyperlipidemia, unspecified hyperlipidemia type  -     CBC W/O DIFF; Future  -     METABOLIC PANEL, COMPREHENSIVE; Future  -     LIPID PANEL; Future  6. Medicare annual wellness visit, subsequent  7. Screening for diabetes mellitus       Depression Risk Factor Screening     3 most recent PHQ Screens 10/3/2022   Little interest or pleasure in doing things Not at all   Feeling down, depressed, irritable, or hopeless Not at all   Total Score PHQ 2 0       Alcohol & Drug Abuse Risk Screen    Do you average more than 1 drink per night or more than 7 drinks a week: No    In the past three months have you have had more than 4 drinks containing alcohol on one occasion: No          Functional Ability and Level of Safety    Hearing: Hearing is good. Activities of Daily Living: The home contains: no safety equipment. Patient does total self care      Ambulation: with mild difficulty     Fall Risk:  Fall Risk Assessment, last 12 mths 10/3/2022   Able to walk? Yes   Fall in past 12 months? 0   Do you feel unsteady? 1   Are you worried about falling 0   Is TUG test greater than 12 seconds? -   Is the gait abnormal? -   Number of falls in past 12 months -   Fall with injury?  -      Abuse Screen:  Patient is not abused       Cognitive Screening    Has your family/caregiver stated any concerns about your memory: no     Cognitive Screening: Normal - Mini Cog Test    Health Maintenance Due     Health Maintenance Due   Topic Date Due    Flu Vaccine (1) 08/01/2022       Patient Care Team   Patient Care Team:  Yocasta Greer MD as PCP - General (Internal Medicine Physician)  Yocasta Greer MD as PCP - REHABILITATION [facility] Empaneled Provider    History     Patient Active Problem List   Diagnosis Code    Impotence of organic origin N52.9    Right bundle branch block I45.10    Colon polyp K63.5    Osteoporosis s/p compression fx L4, fx wrist  M81.0    Nephrolithiasis N20.0    Inguinal hernia, right Dr Davide Hogan K40.90    Low back pain with left-sided sciatica M54.42    Advance directive in chart Z78.9    Sensorineural hearing loss, bilateral H90.3    Varicose veins of both legs with edema I83.893    Neuropathy G62.9     Past Medical History:   Diagnosis Date    B12 deficiency     Closed compression fracture of L4 lumbar vertebra     Colon polyps     Dr Sravan Vyas    Degenerative arthritis of cervical spine 12/2020    xray showed mod/sev discogenic degen changes C5-T1, asymmetric L facet hypertrophy C3-5    Degenerative arthritis of lumbar spine     MRI 11/14 w multilevel disease; chronic lbp w sciatica saw Dr Coffey Angelo    Diverticulosis     on CT 4/16    ED (erectile dysfunction)     Nephrolithiasis 04/2016    right tiny stone on CT; microhematuria    Neuropathy 10/2022    Osteoporosis     ibandronate 2009-5/13 stopped due to tooth issues per his endodontist;  DEXA t score -2.4 spibe, -2.1 hip (8/11); -2.3 spine, -1.9 hip (8/13); -2.4 spine, -2.4 hip (9/15); prolia 4/22- NP Ceci    Proctalgia fugax     RBBB (right bundle branch block)     Right inguinal hernia     Dr Davide Hogan    Tinnitus     Varicose veins of both legs with edema 09/15/2022    Dr Karel Hood      Past Surgical History:   Procedure Laterality Date    HX CATARACT REMOVAL      HX COLONOSCOPY      Dr Sravan Vyas 2012 negative    HX HERNIA REPAIR  2002    [facility] repair    HX WRIST FRACTURE [address]  02/2015    s/p ORIF with right dorsal endoplate nail, two proximal screws, and four distal pegs. Current Outpatient Medications   Medication Sig Dispense Refill    IBUPROFEN PO Take  by mouth. Doesn't know exact dose takes 1.5 with tylenol in am      gabapentin (Neurontin) 300 mg capsule Take 1 Capsule by mouth three (3) times daily. Max Daily Amount: 900 mg. 270 Capsule 1    acetaminophen (TYLENOL) 650 mg TbER Take 650 mg by mouth every eight (8) hours. prn      lidocaine (LIDODERM) 5 % Apply patch to the affected area for 12 hours a day and remove for 12 hours a day. 30 Each 2    vitamin P-H-U-lutein-minerals (OCUVITE) tablet Take 1 Tablet in the morning. GLUC/CHND/OM3/DHA/EPA/FISH/STR (GLUCOSAMINE CHONDROITIN PLUS PO) Take  by mouth.      cyanocobalamin 1,000 mcg tablet Take 1,000 mcg by mouth daily. cholecalciferol (VITAMIN D3) 25 mcg (1,000 unit) cap Take 2,000 Units by mouth.        Allergies   Allergen Reactions    Sulfa (Sulfonamide Antibiotics) Nausea Only     Other reaction(s): gi distress, Unknown    Tramadol Nausea and Vomiting     Other reaction(s): unknown       Family History   Problem Relation Age of Onset    Cancer Mother     Heart Disease Father      Social History     Tobacco Use    Smoking status: Never    Smokeless tobacco: Never   Substance Use Topics    Alcohol use: No         Leanan Gandhi MD

## 2022-10-03 ENCOUNTER — OFFICE VISIT (OUTPATIENT)
Dept: INTERNAL MEDICINE CLINIC | Age: 87
End: 2022-10-03
Payer: MEDICARE

## 2022-10-03 VITALS
WEIGHT: 170 LBS | HEART RATE: 61 BPM | OXYGEN SATURATION: 98 % | RESPIRATION RATE: 16 BRPM | TEMPERATURE: 97 F | HEIGHT: 71 IN | BODY MASS INDEX: 23.8 KG/M2 | SYSTOLIC BLOOD PRESSURE: 124 MMHG | DIASTOLIC BLOOD PRESSURE: 71 MMHG

## 2022-10-03 DIAGNOSIS — G62.9 NEUROPATHY: ICD-10-CM

## 2022-10-03 DIAGNOSIS — K63.5 HYPERPLASTIC COLONIC POLYP, UNSPECIFIED PART OF COLON: ICD-10-CM

## 2022-10-03 DIAGNOSIS — Z00.00 MEDICARE ANNUAL WELLNESS VISIT, SUBSEQUENT: Primary | ICD-10-CM

## 2022-10-03 DIAGNOSIS — E78.5 HYPERLIPIDEMIA, UNSPECIFIED HYPERLIPIDEMIA TYPE: ICD-10-CM

## 2022-10-03 DIAGNOSIS — Z13.1 SCREENING FOR DIABETES MELLITUS: ICD-10-CM

## 2022-10-03 DIAGNOSIS — M80.00XG AGE-RELATED OSTEOPOROSIS WITH CURRENT PATHOLOGICAL FRACTURE WITH DELAYED HEALING, SUBSEQUENT ENCOUNTER: ICD-10-CM

## 2022-10-03 DIAGNOSIS — I83.893 VARICOSE VEINS OF BOTH LEGS WITH EDEMA: ICD-10-CM

## 2022-10-03 PROBLEM — H61.20 IMPACTED CERUMEN: Status: RESOLVED | Noted: 2017-03-08 | Resolved: 2022-10-03

## 2022-10-03 PROCEDURE — G8427 DOCREV CUR MEDS BY ELIG CLIN: HCPCS | Performed by: INTERNAL MEDICINE

## 2022-10-03 PROCEDURE — G8536 NO DOC ELDER MAL SCRN: HCPCS | Performed by: INTERNAL MEDICINE

## 2022-10-03 PROCEDURE — 1123F ACP DISCUSS/DSCN MKR DOCD: CPT | Performed by: INTERNAL MEDICINE

## 2022-10-03 PROCEDURE — G0439 PPPS, SUBSEQ VISIT: HCPCS | Performed by: INTERNAL MEDICINE

## 2022-10-03 PROCEDURE — G8420 CALC BMI NORM PARAMETERS: HCPCS | Performed by: INTERNAL MEDICINE

## 2022-10-03 PROCEDURE — 99214 OFFICE O/P EST MOD 30 MIN: CPT | Performed by: INTERNAL MEDICINE

## 2022-10-03 PROCEDURE — G8510 SCR DEP NEG, NO PLAN REQD: HCPCS | Performed by: INTERNAL MEDICINE

## 2022-10-03 PROCEDURE — 1101F PT FALLS ASSESS-DOCD LE1/YR: CPT | Performed by: INTERNAL MEDICINE

## 2022-10-03 NOTE — PATIENT INSTRUCTIONS
Medicare Wellness Visit, Male    The best way to live healthy is to have a lifestyle where you eat a well-balanced diet, exercise regularly, limit alcohol use, and quit all forms of tobacco/nicotine, if applicable. Regular preventive services are another way to keep healthy. Preventive services (vaccines, screening tests, monitoring & exams) can help personalize your care plan, which helps you manage your own care. Screening tests can find health problems at the earliest stages, when they are easiest to treat. [facility] follows the current, evidence-based guidelines published by the [facility] ([facility]) when recommending preventive services for our patients. Because we follow these guidelines, sometimes recommendations change over time as research supports it. (For example, a prostate screening blood test is no longer routinely recommended for men with no symptoms). Of course, you and your doctor may decide to screen more often for some diseases, based on your risk and co-morbidities (chronic disease you are already diagnosed with). Preventive services for you include:  - Medicare offers their members a free annual wellness visit, which is time for you and your primary care provider to discuss and plan for your preventive service needs. Take advantage of this benefit every year!  -All adults over age 72 should receive the recommended pneumonia vaccines. Current USPSTF guidelines recommend a series of two vaccines for the best pneumonia protection.   -All adults should have a flu vaccine yearly and tetanus vaccine every 10 years.  -All adults age 48 and older should receive the shingles vaccines (series of two vaccines).        -All adults age 38-68 who are overweight should have a diabetes screening test once every three years.   -Other screening tests & preventive services for persons with diabetes include: an eye exam to screen for diabetic retinopathy, a kidney function test, a foot exam, and stricter control over your cholesterol.   -Cardiovascular screening for adults with routine risk involves an electrocardiogram (ECG) at intervals determined by the provider.   -Colorectal cancer screening should be done for adults age 54-65 with no increased risk factors for colorectal cancer. There are a number of acceptable methods of screening for this type of cancer. Each test has its own benefits and drawbacks. Discuss with your provider what is most appropriate for you during your annual wellness visit. The different tests include: colonoscopy (considered the best screening method), a fecal occult blood test, a fecal DNA test, and sigmoidoscopy.  -All adults born between [facility] should be screened once for Hepatitis C.  -An Abdominal Aortic Aneurysm (AAA) Screening is recommended for men age 73-68 who has ever smoked in their lifetime.      Here is a list of your current Health Maintenance items (your personalized list of preventive services) with a due date:  Health Maintenance Due   Topic Date Due    Yearly Flu Vaccine (1) 08/01/2022

## 2022-10-24 ENCOUNTER — HOSPITAL ENCOUNTER (OUTPATIENT)
Dept: INFUSION THERAPY | Age: 87
Discharge: HOME | End: 2022-10-24
Payer: MEDICARE

## 2022-10-24 VITALS
RESPIRATION RATE: 16 BRPM | SYSTOLIC BLOOD PRESSURE: 133 MMHG | TEMPERATURE: 97.4 F | OXYGEN SATURATION: 97 % | HEART RATE: 64 BPM | DIASTOLIC BLOOD PRESSURE: 73 MMHG

## 2022-10-24 DIAGNOSIS — M80.00XG AGE-RELATED OSTEOPOROSIS WITH CURRENT PATHOLOGICAL FRACTURE WITH DELAYED HEALING, SUBSEQUENT ENCOUNTER: Primary | ICD-10-CM

## 2022-10-24 LAB
MAGNESIUM SERPL-MCNC: 2.2 MG/DL (ref 1.6–2.6)
PHOSPHATE SERPL-MCNC: 3.5 MG/DL (ref 2.5–4.9)

## 2022-10-24 PROCEDURE — 83735 ASSAY OF MAGNESIUM: CPT

## 2022-10-24 PROCEDURE — 84100 ASSAY OF PHOSPHORUS: CPT

## 2022-10-24 PROCEDURE — 36415 COLL VENOUS BLD VENIPUNCTURE: CPT

## 2022-10-24 PROCEDURE — 74011250636 HC RX REV CODE- 250/636: Performed by: NURSE PRACTITIONER

## 2022-10-24 PROCEDURE — 96372 THER/PROPH/DIAG INJ SC/IM: CPT

## 2022-10-24 RX ORDER — DIPHENHYDRAMINE HYDROCHLORIDE 50 MG/ML
50 INJECTION, SOLUTION INTRAMUSCULAR; INTRAVENOUS AS NEEDED
Start: 2022-11-03

## 2022-10-24 RX ORDER — HYDROCORTISONE SODIUM SUCCINATE 100 MG/2ML
100 INJECTION, POWDER, FOR SOLUTION INTRAMUSCULAR; INTRAVENOUS AS NEEDED
OUTPATIENT
Start: 2022-11-03

## 2022-10-24 RX ORDER — ALBUTEROL SULFATE 0.83 MG/ML
2.5 SOLUTION RESPIRATORY (INHALATION) AS NEEDED
Start: 2022-11-03

## 2022-10-24 RX ORDER — ACETAMINOPHEN 325 MG/1
650 TABLET ORAL AS NEEDED
Start: 2022-11-03

## 2022-10-24 RX ORDER — EPINEPHRINE 1 MG/ML
0.3 INJECTION, SOLUTION, CONCENTRATE INTRAVENOUS AS NEEDED
OUTPATIENT
Start: 2022-11-03

## 2022-10-24 RX ORDER — DIPHENHYDRAMINE HYDROCHLORIDE 50 MG/ML
25 INJECTION, SOLUTION INTRAMUSCULAR; INTRAVENOUS AS NEEDED
Start: 2022-11-03

## 2022-10-24 RX ORDER — ONDANSETRON 2 MG/ML
8 INJECTION INTRAMUSCULAR; INTRAVENOUS AS NEEDED
OUTPATIENT
Start: 2022-11-03

## 2022-10-24 RX ADMIN — DENOSUMAB 60 MG: 60 INJECTION SUBCUTANEOUS at 14:51

## 2022-10-24 NOTE — PROGRESS NOTES
[facility] Progress Note    Date: 2022    Name: Kristian Gerard    MRN: [id number]         : 1930    PROLIA Injection Q 6 months    Mr. Prem Perales to [facility], ambulatory at 1435. Pt was assessed and education was provided. Pt states he tolerated first injection without any problems. Visit Vitals  /73 (BP 1 Location: Left upper arm, BP Patient Position: Sitting)   Pulse 64   Temp 97.4 °F (36.3 °C)   Resp 16   SpO2 97%       Lab results were reviewed from 22 Calcium 8.9. Mag or phos was not drawn at that time. These labs were drawn from his [facility] per order. Specimens sent to lab for processing via . Prolia 60 mg administered SQ in patient's RIGHT upper back of arm. No irritation or bleeding noted at site. Bandaid applied. Mr. Prem Perales tolerated well, and had no complaints. Patient armband removed shredded. Mr. Prem Perales was discharged from [place] [address] in stable condition at . He is to return on 22 at 1300 for his next Prolia Injection appointment.       Yudi Chung, RN,RN  2022  4:27 PM

## 2023-01-05 ENCOUNTER — OFFICE VISIT (OUTPATIENT)
Dept: VASCULAR SURGERY | Age: 88
End: 2023-01-05
Payer: MEDICARE

## 2023-01-05 VITALS
OXYGEN SATURATION: 99 % | SYSTOLIC BLOOD PRESSURE: 100 MMHG | BODY MASS INDEX: 24.5 KG/M2 | DIASTOLIC BLOOD PRESSURE: 60 MMHG | WEIGHT: 175 LBS | HEART RATE: 68 BPM | HEIGHT: 71 IN

## 2023-01-05 DIAGNOSIS — I83.893 VARICOSE VEINS OF BOTH LEGS WITH EDEMA: Primary | ICD-10-CM

## 2023-01-05 PROCEDURE — 1123F ACP DISCUSS/DSCN MKR DOCD: CPT | Performed by: SURGERY

## 2023-01-05 PROCEDURE — G8432 DEP SCR NOT DOC, RNG: HCPCS | Performed by: SURGERY

## 2023-01-05 PROCEDURE — G8420 CALC BMI NORM PARAMETERS: HCPCS | Performed by: SURGERY

## 2023-01-05 PROCEDURE — 99213 OFFICE O/P EST LOW 20 MIN: CPT | Performed by: SURGERY

## 2023-01-05 PROCEDURE — 1101F PT FALLS ASSESS-DOCD LE1/YR: CPT | Performed by: SURGERY

## 2023-01-05 PROCEDURE — G8536 NO DOC ELDER MAL SCRN: HCPCS | Performed by: SURGERY

## 2023-01-05 PROCEDURE — G8427 DOCREV CUR MEDS BY ELIG CLIN: HCPCS | Performed by: SURGERY

## 2023-01-05 NOTE — PROGRESS NOTES
Rasheeda Dahl    Chief Complaint   Patient presents with    Leg Pain     3 month follow up         History and Physical    Rasheeda Dahl is a 80 y.o. M with PMH significant for L4 compression fx 2/2 osteoporosis, LBP with lumbar stenoses and sciatica, hearing loss, RBBB. he returns today for evaluation of leg edema. Patient has been wearing compression stockings and states he is feeling much better since then. Left leg no longer swells . Occasional sharp pain in the left distal calf at night which resolve spontaneously. Happens about once a week    Previously obtained venous history:  he describes edema and discoloration of BLE. He states that he was seen by his podiatrist who referred him for evaluation. Discoloration has worsened over the past several months. He also experiences some pain in the distal left leg and ankle. Edema increases over the course of the day and improves overnight. 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 Onset of symptoms was several years ago     Associated symptoms:   [x] edema  [x] varicose veins  [x] heaviness/aching  [] fatigue  [] pain    Aggravating factors include prolonged sitting. Relieving factors include elevation. Patient   [] has   [x] has not   been wearing compression stockings. Relevant history:   [] female gender  [] Family history of venous disease  [] history of pregnancy: n/a  [] history of DVT/PE  [] history of vein procedure        The most recent PVL was reviewed and discussed with the patient. This shows clinically significant venous insufficiency in bilateral GSV as well as bilateral common femoral veins. Interpretation Summary       No evidence of acute deep vein thrombosis in the bilateral common femoral, femoral, popliteal, posterior tibial, and peroneal veins. The veins were imaged in the transverse and longitudinal planes. The vessels showed normal color filling and compressibility.  Doppler interrogation showed phasic and spontaneous flow. Isolated deep venous insufficiency in the bilateral common femoral veins. Superficial venous insufficiency in the bilateral great saphenous veins. No evidence of venous insufficiency in the bilateral small saphenous veins. Triphasic posterior tibial arteries bilaterally. DVT demonstrated bilateral lower extremity. Isolated venous insufficiency demonstrated in bilateral common femoral veins. Bilateral greater saphenous veins demonstrate venous reflux. Bilateral small saphenous veins appear competent. Bilateral posterior tibial arteries are triphasic. Lower Extremity Venous Findings    Right Lower Venous    No evidence of deep vein thrombosis in the common femoral, profunda femoral, femoral, popliteal, posterior tibial, and peroneal veins. The veins were imaged in the transverse and longitudinal planes. The vessels showed normal color filling and compressibility. Doppler interrogation showed phasic and spontaneous flow. The right posterior tibial artery has triphasic waveforms. Reflux study patient position: reverse Trendelenburg. Deep venous insufficiency noted in the right common femoral vein. (Reflux of >1.0 second.) Right great saphenous vein displays venous insufficiency. Right small saphenous vein is competent. Left Lower Venous    No evidence of deep vein thrombosis in the common femoral, profunda femoral, femoral, popliteal, posterior tibial, and peroneal veins. The veins were imaged in the transverse and longitudinal planes. The vessels showed normal color filling and compressibility. Doppler interrogation showed phasic and spontaneous flow. The left posterior tibial artery has triphasic waveforms. Reflux study patient position: reverse Trendelenburg. Deep venous insufficiency noted in the left common femoral vein. (Reflux of >1.0 second.) Left great saphenous vein displays venous insufficiency.  Left small saphenous vein is competent.      Right Lower Extremity Venous Measurements     Vein Diam Reflux Time   GSV Junction 0.93 cm              GSV Thigh Prox 0.5 cm        2.1 s          GSV Thigh Mid 0.39 cm              GSV Thigh Dist 0.42 cm              GSV At Knee 0.38 cm        1.1 s          GSV Below Knee Prox 0.36 cm              GSV Below Knee Mid 0.22 cm              GSV Below Knee Dist 0.19 cm              GSV Ankle 0.21 cm              SSV Prox 0.12 cm              CFV     4.9 s            Left Lower Extremity Venous Measurements     Vein Diam Reflux Time   GSV Junction 0.83 cm        2.2 s          GSV Thigh Prox 0.52 cm              GSV Thigh Mid 0.43 cm              GSV Thigh Dist 0.54 cm              GSV At Knee 0.38 cm        1.9 s          GSV Below Knee Prox 0.35 cm              GSV Below Knee Mid 0.36 cm              GSV Below Knee Dist 0.31 cm              GSV Ankle 0.31 cm              CFV     2.2 s              Past Medical History:   Diagnosis Date    B12 deficiency     Closed compression fracture of L4 lumbar vertebra     Colon polyps     Dr Paul Ruelas    Degenerative arthritis of cervical spine 12/2020    xray showed mod/sev discogenic degen changes C5-T1, asymmetric L facet hypertrophy C3-5    Degenerative arthritis of lumbar spine     MRI 11/14 w multilevel disease; chronic lbp w sciatica saw Dr Marbella Dickinson    Diverticulosis     on CT 4/16    ED (erectile dysfunction)     Nephrolithiasis 04/2016    right tiny stone on CT; microhematuria    Neuropathy 10/2022    Osteoporosis     ibandronate 2009-5/13 stopped due to tooth issues per his endodontist;  DEXA t score -2.4 spibe, -2.1 hip (8/11); -2.3 spine, -1.9 hip (8/13); -2.4 spine, -2.4 hip (9/15); prolia 4/22- NP Ceci    Proctalgia fugax     RBBB (right bundle branch block)     Right inguinal hernia     Dr Amadeo Alvarez    Tinnitus     Varicose veins of both legs with edema 09/15/2022    Dr Guerra [facility]     Past Surgical History:   Procedure Laterality Date    HX CATARACT REMOVAL      HX COLONOSCOPY      Dr Brynn Bah 2012 negative    HX HERNIA REPAIR  2002    [facility] repair    HX WRIST FRACTURE [address]  02/2015    s/p ORIF with right dorsal endoplate nail, two proximal screws, and four distal pegs. Patient Active Problem List   Diagnosis Code    Impotence of organic origin N52.9    Right bundle branch block I45.10    Colon polyp K63.5    Osteoporosis s/p compression fx L4, fx wrist  M81.0    Nephrolithiasis N20.0    Inguinal hernia, right Dr Olaf Wiley K40.90    Low back pain with left-sided sciatica M54.42    Advance directive in chart Z78.9    Sensorineural hearing loss, bilateral H90.3    Varicose veins of both legs with edema I83.893    Neuropathy G62.9     Current Outpatient Medications   Medication Sig Dispense Refill    IBUPROFEN PO Take  by mouth. Doesn't know exact dose takes 1.5 with tylenol in am      gabapentin (Neurontin) 300 mg capsule Take 1 Capsule by mouth three (3) times daily. Max Daily Amount: 900 mg. 270 Capsule 1    acetaminophen (TYLENOL) 650 mg TbER Take 650 mg by mouth every eight (8) hours. prn      lidocaine (LIDODERM) 5 % Apply patch to the affected area for 12 hours a day and remove for 12 hours a day. 30 Each 2    vitamin D-B-L-lutein-minerals (OCUVITE) tablet Take 1 Tablet in the morning. GLUC/CHND/OM3/DHA/EPA/FISH/STR (GLUCOSAMINE CHONDROITIN PLUS PO) Take  by mouth.      cyanocobalamin 1,000 mcg tablet Take 1,000 mcg by mouth daily. cholecalciferol (VITAMIN D3) 25 mcg (1,000 unit) cap Take 2,000 Units by mouth.        Allergies   Allergen Reactions    Sulfa (Sulfonamide Antibiotics) Nausea Only     Other reaction(s): gi distress, Unknown    Tramadol Nausea and Vomiting     Other reaction(s): unknown     Social History     Socioeconomic History    Marital status:      Spouse name: Not on file    Number of children: Not on file    Years of education: Not on file    Highest education level: Not on file   Occupational History    Not on file Tobacco Use    Smoking status: Never    Smokeless tobacco: Never   Substance and Sexual Activity    Alcohol use: No    Drug use: No    Sexual activity: Not Currently   Other Topics Concern    Not on file   Social History Narrative    Not on file     Social Determinants of Health     Financial Resource Strain: Not on file   Food Insecurity: Not on file   Transportation Needs: Not on file   Physical Activity: Not on file   Stress: Not on file   Social Connections: Not on file   Intimate Partner Violence: Not on file   Housing Stability: Not on file      Family History   Problem Relation Age of Onset    Cancer Mother     Heart Disease Father        Physical Exam:    Visit Vitals  /60   Pulse 68   Ht 5' 11\" (1.803 m)   Wt 175 lb (79.4 kg)   SpO2 99%   BMI 24.41 kg/m²          Constitutional:  Patient is well developed, well nourished, and not distressed. HEENT: atraumatic, normocephalic, wearing a mask. Eyes:   Cunjunctivae clear, no scleral icterus  Cardiovascular:  Normal rate, regular rhythm  Pulses:       Right:      Post Tib      2+ Left:        Post Tib      2+     Pulmonary/Chest: Effort normal    Extremities: Normal range of motion. Trace edema bilaterally extending to the distal leg. Bilateral hyperpigmentation, worse on the left. Lipodermatosclerosis and improved reactive erythema on the left. Bilateral corona phlebectatica. Digits no cyanosis or clubbing  Neurological:  he  is alert and oriented x3 . Gait normal. Motor & sensory grossly intact in all 4 limbs. Psych: Appropriate mood and affect. Skin:  Skin is warm and dry. No rash noted. No ulcers. Impression and Plan:  Colt Murphy is a 80 y.o. male with bilateral lower extremity edema    1. Bilateral venous insufficiency, CEAP classification C3s Ep As,d Pr. Reflux study with clinically significant reflux in bilateral GSV and common femoral veins. Maximal reflux times up to 2.2s.    Venous pathophysiology and treatment options, including surgical treatment, were discussed with the patient in detail again. He wants to continue conservative management. He will return in 6 months in follow up or sooner if symptoms worsen        We reviewed the plan with the patient and the patient understands. Kelvin Schaffer MD    PLEASE NOTE:  This document has been produced using voice recognition software. Unrecognized errors in transcription may be present.

## 2023-02-04 DIAGNOSIS — E78.5 HYPERLIPIDEMIA, UNSPECIFIED HYPERLIPIDEMIA TYPE: Primary | ICD-10-CM

## 2023-02-05 DIAGNOSIS — E78.5 HYPERLIPIDEMIA, UNSPECIFIED HYPERLIPIDEMIA TYPE: Primary | ICD-10-CM

## 2023-03-02 ENCOUNTER — OFFICE VISIT (OUTPATIENT)
Age: 88
End: 2023-03-02

## 2023-03-02 VITALS
HEART RATE: 76 BPM | WEIGHT: 172 LBS | SYSTOLIC BLOOD PRESSURE: 112 MMHG | HEIGHT: 71 IN | BODY MASS INDEX: 24.08 KG/M2 | OXYGEN SATURATION: 97 % | TEMPERATURE: 97.1 F | DIASTOLIC BLOOD PRESSURE: 67 MMHG

## 2023-03-02 DIAGNOSIS — M54.2 CERVICAL PAIN: Primary | ICD-10-CM

## 2023-03-02 DIAGNOSIS — M47.816 SPONDYLOSIS WITHOUT MYELOPATHY OR RADICULOPATHY, LUMBAR REGION: ICD-10-CM

## 2023-03-02 DIAGNOSIS — M50.30 DDD (DEGENERATIVE DISC DISEASE), CERVICAL: ICD-10-CM

## 2023-03-02 DIAGNOSIS — M54.50 LUMBAR PAIN: ICD-10-CM

## 2023-03-02 DIAGNOSIS — M54.2 CERVICAL PAIN (NECK): ICD-10-CM

## 2023-03-02 DIAGNOSIS — M47.812 CERVICAL SPONDYLOSIS WITHOUT MYELOPATHY: ICD-10-CM

## 2023-03-02 DIAGNOSIS — M51.36 DDD (DEGENERATIVE DISC DISEASE), LUMBAR: ICD-10-CM

## 2023-03-02 RX ORDER — LIDOCAINE 50 MG/G
PATCH TOPICAL
COMMUNITY
Start: 2022-03-21

## 2023-03-02 RX ORDER — SENNOSIDES 8.6 MG
650 CAPSULE ORAL EVERY 8 HOURS
COMMUNITY

## 2023-03-02 RX ORDER — GABAPENTIN 300 MG/1
300 CAPSULE ORAL 3 TIMES DAILY
COMMUNITY
Start: 2022-09-01

## 2023-03-02 RX ORDER — GABAPENTIN 300 MG/1
300 CAPSULE ORAL 3 TIMES DAILY
Qty: 270 CAPSULE | Refills: 1 | Status: CANCELLED | OUTPATIENT
Start: 2023-03-02 | End: 2023-05-31

## 2023-03-02 ASSESSMENT — PATIENT HEALTH QUESTIONNAIRE - PHQ9
1. LITTLE INTEREST OR PLEASURE IN DOING THINGS: 0
SUM OF ALL RESPONSES TO PHQ QUESTIONS 1-9: 0
SUM OF ALL RESPONSES TO PHQ9 QUESTIONS 1 & 2: 0
SUM OF ALL RESPONSES TO PHQ QUESTIONS 1-9: 0
2. FEELING DOWN, DEPRESSED OR HOPELESS: 0

## 2023-03-02 NOTE — PROGRESS NOTES
[facility]  [address], [address]  Phone: [phone]  Fax: [phone]        PROGRESS NOTE      HISTORY OF PRESENT ILLNESS:  The patient is a 80 y.o. male and was seen today for follow up of low back pain with paresthesia in his bilateral feet and new complaint of left sided neck pain to the left shoulder w/o radicular symptoms. Previously seen for increase in T spine pain after taking care of his wife over the past weekend. Previously seen for an increase in low back pain since  4/25/2022 after moving a flower pot. He noted lower back pain radiates into his sides, pain will also occasionally radiate into his abdominal/rib area. Previously seen for progressive thoracolumbar  junction pain radiating bilaterally to the ribs. x 12/5/2021 without injury, exact distribution unclear Denies radicular sxs. Previously seen for  paraesthesias in the RLE from the knee down to the foot x 2 weeks. He reports his initial  back sxs have settled down. Initially had c/o  acute onset of left-sided low back pain since the beginning of May 2019. Pt reports onset of sxs after bending down to  a board. He states his pain has improved since onset. Positive shopping cart sign. Reviewing the  records, it appears as though the pt has had some chronic issues with his low back, as he is on Neurontin. Pt denies h/o spinal surgery or PT. Pt reports having blocks in 2014 with relief. He has been treated with MDP with relief and Tylenol #3 without  relief. He self-treats with Motrin prn. Pt denies change in bowel or bladder habits. Pt denies fever, weight loss, or skin changes. The patient is  RHD. Previously seen by [facility]. [facility] 11/24/14 with c/o left-sided low back pain, which occurred after bending over to  a box. MRI revealed compression fracture at L4.   Note from Radha Anders MD  dated 5/7/19 indicating patient was seen with c/o left-low back pain extending into the  left lateral thigh. Of note, pt previously saw Dr. Petra Braga. Treated with MDP and Tramadol at that time. Note from Baron Chris NP  dated 3/21/2022 indicating patient was seen for osteoporosis. Recent bone density showed  he is osteoporotic. Continues to have T spine pain, described as dull and achy. Lidoderm patches help. He continues to take Neurontin. Ordered lab studies on him and provided with Lidoderm patches. Scheduled to f/u in 6 month's time with me. Lumbar spine XR dated 5/16/19  reviewed. Per report, Age indeterminate compression deformities at L1 and L2, new since 4/2016. Chronic compression deformities at T11 and L3-L5. Questionable acute on chronic compression fracture  L4, with interval increase loss of vertebral body height at that level. MRI may be helpful for further evaluation. Degenerative findings are as described. Osteopenia. Chest XR dated  12/13/2021 films not independently reviewed. Per report, chronic mid and lower thoracic mild vertebral wedging. No definite acute displaced  thoracic fracture. Poorly visualized multiple lumbar compression deformities which are mainly present on prior lumbar radiograph but difficult to assess for interval change, if high clinical suspicion  for acute lumbar abnormalities recommend MRI. Lumbar spine MRI dated 1/13/2022 films  independently reviewed. Per report, Chronic appearing compression deformities throughout the lumbar spine. Multilevel advanced facet arthropathy and multilevel mild disc herniations. No critical spinal canal stenosis at any level. Multifocal mild to moderate foraminal stenoses. Colonic diverticulosis noted. Thoracic spine MRI dated 1/13/2022  films independently reviewed. Per report, T10 mild inferior endplate acute to subacute compression fracture. Spinal canal  patent throughout the thoracic spine. Multifocal mild to moderate foraminal stenoses, most notable at right T5-T6.  Thoracic spine XR dated 3/31/2022  films independently reviewed. Per report, there are 12 thoracic type vertebra. There is mildly increased kyphosis of the thoracic spine. Compression deformities of T10 and T12 as well as L1 are noted. No new fracture identified. Pedicles are present at every level. Multilevel degenerative disc disease is noted. DUPLEX LE venous bilaterally dated 8/23/2022 revealed no blood blots. No evidence of acute deep vein thrombosis in the bilateral common femoral,  femoral, popliteal, posterior tibial, and peroneal veins. The veins were imaged in the transverse and longitudinal planes. The vessels showed normal color filling and compressibility. Doppler interrogation showed phasic and spontaneous flow. Isolated  deep venous insufficiency in the bilateral common femoral veins. Superficial venous insufficiency in the bilateral great saphenous veins. No evidence of venous insufficiency in the bilateral small saphenous veins. Triphasic posterior tibial arteries bilaterally. At his last clinical appointment, Patient wished to continue with the current treatment plan. I refilled his Gabapentin 300 mg TID. The patient returns today with low back pain with paresthesia in his bilateral feet and new complaint of left sided neck pain to the left shoulder w/o radicular symptoms. He rates his low back pain 2/10(neck pain a 4-5/10), previously 0/10. Patient says he picked up a battery and said he has some muscular pain in his right sided lower back. Patient denies DM. Patient said he went to the vascular surgeon and was told he has vascular insufficiency. Pt denies change in bowel or bladder habits. Patient says he has had neck pain x 1 year w/o injury or trauma. He denies loss of balance, falls, or impairments in manual dexterity. His pain is exacerbated by rotating to the left or right and cervical extension. Patient denies neck surgery or injections. Patient says that the worst their neck pain pain has been in the past week was a 6/10. Patient says he misses the Gabapentin 300 mg TID at around noon time, so BID.  reviewed. Gabapentin me Body mass index is 23.99 kg/m². PCP: Uli Fink MD      Past Medical History:   Diagnosis Date    B12 deficiency     Closed compression fracture of L4 lumbar vertebra     Colon polyps     Dr Uyen Shaw    Degenerative arthritis of cervical spine 12/2020    xray showed mod/sev discogenic degen changes C5-T1, asymmetric L facet hypertrophy C3-5    Degenerative arthritis of lumbar spine     MRI 11/14 w multilevel disease; chronic lbp w sciatica saw  [facility]    Diverticulosis     on CT 4/16    ED (erectile dysfunction)     Nephrolithiasis 04/2016    right tiny stone on CT; microhematuria    Neuropathy 10/2022    Osteoporosis     ibandronate 2009-5/13 stopped due to tooth issues per his endodontist;  DEXA t score -2.4 spibe, -2.1 hip (8/11); -2.3 spine, -1.9 hip (8/13); -2.4 spine, -2.4 hip (9/15); prolia 4/22- NP Owen    Proctalgia fugax     RBBB (right bundle branch block)     Right inguinal hernia     Dr Christiane Guy    Tinnitus     Varicose veins of both legs with edema 09/15/2022    Dr Farzaneh Alcantara History     Socioeconomic History    Marital status:      Spouse name: None    Number of children: None    Years of education: None    Highest education level: None   Tobacco Use    Smoking status: Never    Smokeless tobacco: Never   Substance and Sexual Activity    Alcohol use: No    Drug use: No       Current Outpatient Medications   Medication Sig Dispense Refill    gabapentin (NEURONTIN) 300 MG capsule Take 300 mg by mouth 3 times daily. acetaminophen (TYLENOL) 650 MG extended release tablet Take 650 mg by mouth in the morning and 650 mg at noon and 650 mg in the evening.       carboxymethylcellulose 1 % ophthalmic solution 1 drop into both eyes as need      vitamin D 25 MCG (1000 UT) CAPS Take 2,000 Units by mouth daily      cyanocobalamin 1000 MCG tablet Take 1,000 mcg by mouth daily      lidocaine (LIDODERM) 5 % Apply patch to the affected area for 12 hours a day and remove for 12 hours a day. Glucosamine-Chondroitin--200-150 MG TABS Take 1 tablet by mouth daily      Multiple Vitamins-Minerals (MULTIVITAMIN ADULTS 50+ PO) Take 1 tablet by mouth daily       No current facility-administered medications for this visit. Allergies   Allergen Reactions    Sulfa Antibiotics Other (See Comments) and Nausea Only     Other reaction(s): gi distress, Unknown      Tramadol Nausea And Vomiting and Other (See Comments)     Other reaction(s): unknown            PHYSICAL EXAMINATION    /67 (Site: Left Upper Arm, Position: Sitting, Cuff Size: Medium Adult)   Pulse 76   Temp 97.1 °F (36.2 °C) (Tympanic)   Ht 5' 11\" (1.803 m)   Wt 172 lb (78 kg)   SpO2 97% Comment: RA  BMI 23.99 kg/m²     CONSTITUTIONAL: NAD, A&O x 3  SENSATION: Sensation is intact to light touch throughout. NEURO: Valentin's is negative bilaterally. MOTOR:  Straight Leg Raise: Negative, bilateral    Ambulates without assistive device. Shoulder AB/Flex Elbow Flex Wrist Ext Elbow Ext Wrist Flex Hand Intrin Tone   Right +4/5 +4/5 +4/5 +4/5 +4/5 +4/5 +4/5   Left +4/5 +4/5 +4/5 +4/5 +4/5 +4/5 +4/5              Hip Flex Knee Ext Knee Flex Ankle DF GTE Ankle PF Tone   Right +4/5 +4/5 +4/5 +4/5 +4/5 +4/5 +4/5   Left +4/5 +4/5 +4/5 +4/5 +4/5 +4/5 +4/5     RADIOGRAPHS  Cervical spine plain films dated 3/2/2023. 2 views: AP and lateral. Revealed: Moderate disc space narrowing C5-6. Moderate to severe disc space narrowing C6-7. Slight anterolisthesis 4-5. ASSESSMENT   Valerie Smith was seen today for back pain.     Diagnoses and all orders for this visit:    Cervical pain  -     AMB POC XRAY, SPINE, CERVICAL; 2 OR 3    Lumbar pain    Cervical pain (neck)    Spondylosis without myelopathy or radiculopathy, lumbar region    DDD (degenerative disc disease), lumbar    Cervical spondylosis without myelopathy    DDD (degenerative disc disease), cervical        IMPRESSION AND PLAN:  Patient returns to the office today with c/o low back pain with paresthesia in his bilateral feet and new complaint of left sided neck pain to the left shoulder w/o radicular symptoms. Multiple treatment options were discussed. Patient wished to continue with his current treatment plan for his lower back. Patient does not need a refill of his Gabapentin 300 mg TID. I told him to call the office if he needs a refill of his Gabapentin before the next office visit. I offered MDP, pt declined. I offered PT, pt declined. Patient is neurologically intact. I will see the patient back in 1 month's time or earlier if needed. Written by Bruce Webb, as dictated by Tim Sainz MD  I examined the patient, reviewed and agree with the note.

## 2023-04-18 ENCOUNTER — APPOINTMENT (OUTPATIENT)
Dept: INFUSION THERAPY | Age: 88
End: 2023-04-18

## 2023-04-19 ENCOUNTER — HOSPITAL ENCOUNTER (OUTPATIENT)
Facility: HOSPITAL | Age: 88
Setting detail: INFUSION SERIES
End: 2023-04-19
Payer: MEDICARE

## 2023-04-19 VITALS
RESPIRATION RATE: 18 BRPM | HEART RATE: 61 BPM | DIASTOLIC BLOOD PRESSURE: 67 MMHG | SYSTOLIC BLOOD PRESSURE: 114 MMHG | TEMPERATURE: 98 F | OXYGEN SATURATION: 96 %

## 2023-04-19 LAB
ANION GAP SERPL CALC-SCNC: 2 MMOL/L (ref 3–18)
BUN SERPL-MCNC: 18 MG/DL (ref 7–18)
BUN/CREAT SERPL: 18 (ref 12–20)
CALCIUM SERPL-MCNC: 8.9 MG/DL (ref 8.5–10.1)
CHLORIDE SERPL-SCNC: 111 MMOL/L (ref 100–111)
CO2 SERPL-SCNC: 28 MMOL/L (ref 21–32)
CREAT SERPL-MCNC: 0.98 MG/DL (ref 0.6–1.3)
GLUCOSE SERPL-MCNC: 102 MG/DL (ref 74–99)
MAGNESIUM SERPL-MCNC: 2.4 MG/DL (ref 1.6–2.6)
PHOSPHATE SERPL-MCNC: 3.8 MG/DL (ref 2.5–4.9)
POTASSIUM SERPL-SCNC: 4.6 MMOL/L (ref 3.5–5.5)
SODIUM SERPL-SCNC: 141 MMOL/L (ref 136–145)

## 2023-04-19 PROCEDURE — 83735 ASSAY OF MAGNESIUM: CPT

## 2023-04-19 PROCEDURE — 80048 BASIC METABOLIC PNL TOTAL CA: CPT

## 2023-04-19 PROCEDURE — 84100 ASSAY OF PHOSPHORUS: CPT

## 2023-04-19 PROCEDURE — 36415 COLL VENOUS BLD VENIPUNCTURE: CPT

## 2023-04-19 NOTE — PROGRESS NOTES
[address] Lab Visit:    Steven Morgan  1/31/1930  [id number]      4917: Pt arrived ambulatory. Blood drawn peripherally for Prolia labs per order via right AC venipuncture x1 attempt and sent for processing. Gauze/ coban to site. Pt tolerated well without complaints. Pt is scheduled to return for treatment on 4/24/23 at 1300. Departed [facility] ambulatory and in no distress at 1420.     Vitals:    04/19/23 1405   BP: 114/67   Pulse: 61   Resp: 18   Temp: 98 °F (36.7 °C)   SpO2: 96%

## 2023-04-21 RX ORDER — METHYLPREDNISOLONE SODIUM SUCCINATE 40 MG/ML
40 INJECTION, POWDER, LYOPHILIZED, FOR SOLUTION INTRAMUSCULAR; INTRAVENOUS PRN
Status: CANCELLED
Start: 2023-04-24

## 2023-04-21 RX ORDER — EPINEPHRINE 1 MG/ML
0.3 INJECTION, SOLUTION, CONCENTRATE INTRAVENOUS PRN
Status: CANCELLED | OUTPATIENT
Start: 2023-04-24

## 2023-04-21 RX ORDER — SODIUM CHLORIDE 9 MG/ML
INJECTION, SOLUTION INTRAVENOUS CONTINUOUS
Status: CANCELLED | OUTPATIENT
Start: 2023-04-24

## 2023-04-21 RX ORDER — ACETAMINOPHEN 325 MG/1
650 TABLET ORAL
Status: CANCELLED | OUTPATIENT
Start: 2023-04-24

## 2023-04-21 RX ORDER — DIPHENHYDRAMINE HYDROCHLORIDE 50 MG/ML
50 INJECTION INTRAMUSCULAR; INTRAVENOUS
Status: CANCELLED | OUTPATIENT
Start: 2023-04-24

## 2023-04-21 RX ORDER — ALBUTEROL SULFATE 90 UG/1
4 AEROSOL, METERED RESPIRATORY (INHALATION) PRN
Status: CANCELLED | OUTPATIENT
Start: 2023-04-24

## 2023-04-21 RX ORDER — ONDANSETRON 2 MG/ML
8 INJECTION INTRAMUSCULAR; INTRAVENOUS
Status: CANCELLED | OUTPATIENT
Start: 2023-04-24

## 2023-04-24 ENCOUNTER — HOSPITAL ENCOUNTER (OUTPATIENT)
Facility: HOSPITAL | Age: 88
Setting detail: INFUSION SERIES
End: 2023-04-24
Payer: MEDICARE

## 2023-04-24 ENCOUNTER — APPOINTMENT (OUTPATIENT)
Dept: INFUSION THERAPY | Age: 88
End: 2023-04-24

## 2023-04-24 VITALS
OXYGEN SATURATION: 95 % | SYSTOLIC BLOOD PRESSURE: 126 MMHG | DIASTOLIC BLOOD PRESSURE: 70 MMHG | TEMPERATURE: 97.5 F | HEART RATE: 55 BPM | RESPIRATION RATE: 18 BRPM

## 2023-04-24 DIAGNOSIS — M81.0 OSTEOPOROSIS, UNSPECIFIED OSTEOPOROSIS TYPE, UNSPECIFIED PATHOLOGICAL FRACTURE PRESENCE: Primary | ICD-10-CM

## 2023-04-24 PROCEDURE — 6360000002 HC RX W HCPCS: Performed by: NURSE PRACTITIONER

## 2023-04-24 PROCEDURE — 96372 THER/PROPH/DIAG INJ SC/IM: CPT

## 2023-04-24 RX ORDER — DIPHENHYDRAMINE HYDROCHLORIDE 50 MG/ML
50 INJECTION INTRAMUSCULAR; INTRAVENOUS
Status: CANCELLED | OUTPATIENT
Start: 2023-10-22

## 2023-04-24 RX ORDER — ACETAMINOPHEN 325 MG/1
650 TABLET ORAL
Status: CANCELLED | OUTPATIENT
Start: 2023-10-22

## 2023-04-24 RX ORDER — EPINEPHRINE 1 MG/ML
0.3 INJECTION, SOLUTION, CONCENTRATE INTRAVENOUS PRN
Status: CANCELLED | OUTPATIENT
Start: 2023-10-22

## 2023-04-24 RX ORDER — SODIUM CHLORIDE 9 MG/ML
INJECTION, SOLUTION INTRAVENOUS CONTINUOUS
Status: CANCELLED | OUTPATIENT
Start: 2023-10-22

## 2023-04-24 RX ORDER — ONDANSETRON 2 MG/ML
8 INJECTION INTRAMUSCULAR; INTRAVENOUS
Status: CANCELLED | OUTPATIENT
Start: 2023-10-22

## 2023-04-24 RX ORDER — ALBUTEROL SULFATE 90 UG/1
4 AEROSOL, METERED RESPIRATORY (INHALATION) PRN
Status: CANCELLED | OUTPATIENT
Start: 2023-10-22

## 2023-04-24 RX ORDER — METHYLPREDNISOLONE SODIUM SUCCINATE 40 MG/ML
40 INJECTION, POWDER, LYOPHILIZED, FOR SOLUTION INTRAMUSCULAR; INTRAVENOUS PRN
Status: CANCELLED
Start: 2023-10-22

## 2023-04-24 RX ADMIN — DENOSUMAB 60 MG: 60 INJECTION SUBCUTANEOUS at 13:35

## 2023-04-24 NOTE — PROGRESS NOTES
[facility] Progress Note    Date: 2023    Name: Iker Mascorro    MRN: [id number]         : 1930      PROLIA Q6 MONTHS      Mr. Kostas Leonardo arrived to [facility] at 1320. Mr. Kostas Leonardo was assessed and education was provided. Mr. Urvashi Benitez vitals were reviewed. Vitals:    23 1320 23 1325   BP: (!) 142/73 126/70   Pulse: 55    Resp: 18    Temp: 97.5 °F (36.4 °C)    TempSrc: Oral    SpO2: 95%        Lab results were obtained and reviewed from pt's pre-Prolia lab visit on 23. Okay to proceed w/ Prolia injection based on patient's treatment plan parameters. Hospital Outpatient Visit on 2023   Component Date Value Ref Range Status    Sodium 2023 141  136 - 145 mmol/L Final    Potassium 2023 4.6  3.5 - 5.5 mmol/L Final    Chloride 2023 111  100 - 111 mmol/L Final    CO2 2023 28  21 - 32 mmol/L Final    Anion Gap 2023 2 (L)  3.0 - 18 mmol/L Final    Glucose 2023 102 (H)  74 - 99 mg/dL Final    BUN 2023 18  7.0 - 18 MG/DL Final    Creatinine 2023 0.98  0.6 - 1.3 MG/DL Final    Bun/Cre Ratio 2023 18  12 - 20   Final    Est, Glom Filt Rate 2023 >60  >60 ml/min/1.73m2 Final    Comment:      Pediatric calculator link: [employer].at. org/professionals/kdoqi/gfr_calculatorped       These results are not intended for use in patients <25years of age. eGFR results are calculated without a race factor using  the  CKD-EPI equation. Careful clinical correlation is recommended, particularly when comparing to results calculated using previous equations. The CKD-EPI equation is less accurate in patients with extremes of muscle mass, extra-renal metabolism of creatinine, excessive creatine ingestion, or following therapy that affects renal tubular secretion.       Calcium 2023 8.9  8.5 - 10.1 MG/DL Final    Magnesium 2023 2.4  1.6 - 2.6 mg/dL Final    Phosphorus 2023 3.8  2.5 - 4.9 MG/DL Final       Prolia 60mg

## 2023-07-07 DIAGNOSIS — M50.30 DDD (DEGENERATIVE DISC DISEASE), CERVICAL: ICD-10-CM

## 2023-07-07 DIAGNOSIS — M43.12 SPONDYLOLISTHESIS, CERVICAL REGION: ICD-10-CM

## 2023-07-07 DIAGNOSIS — M51.36 DDD (DEGENERATIVE DISC DISEASE), LUMBAR: ICD-10-CM

## 2023-07-07 DIAGNOSIS — M47.812 CERVICAL SPONDYLOSIS WITHOUT MYELOPATHY: ICD-10-CM

## 2023-07-07 DIAGNOSIS — M54.50 LUMBAR PAIN: ICD-10-CM

## 2023-07-07 DIAGNOSIS — M47.816 SPONDYLOSIS WITHOUT MYELOPATHY OR RADICULOPATHY, LUMBAR REGION: ICD-10-CM

## 2023-07-07 DIAGNOSIS — M54.2 CERVICAL PAIN: ICD-10-CM

## 2023-07-07 DIAGNOSIS — M54.2 CERVICAL PAIN (NECK): ICD-10-CM

## 2023-07-10 RX ORDER — GABAPENTIN 300 MG/1
300 CAPSULE ORAL 3 TIMES DAILY
Qty: 270 CAPSULE | Refills: 0 | Status: SHIPPED | OUTPATIENT
Start: 2023-07-10 | End: 2023-10-08

## 2023-07-11 ENCOUNTER — TELEPHONE (OUTPATIENT)
Age: 88
End: 2023-07-11

## 2023-07-11 NOTE — TELEPHONE ENCOUNTER
Patient called and had questions about his bone density injections. He stated that they've gotten so expensive etc.  He says he asked Dr. Albin Licona about it but he told him to contact MENDY Sam. Please advise. Patient can be reached at [phone].

## 2023-07-11 NOTE — TELEPHONE ENCOUNTER
Prolia treats his osteoporosis and helps build up the strength of  his bones.   We usually check a new bone density test after 2 years of treatment

## 2023-07-11 NOTE — TELEPHONE ENCOUNTER
Patient contacted and he states he has had 3 injections so far and would like to know how long he is supposed to have them and he wants to know what they actually do for you.

## 2023-07-13 ENCOUNTER — OFFICE VISIT (OUTPATIENT)
Age: 88
End: 2023-07-13
Payer: MEDICARE

## 2023-07-13 VITALS
TEMPERATURE: 97.1 F | RESPIRATION RATE: 16 BRPM | OXYGEN SATURATION: 97 % | WEIGHT: 170 LBS | BODY MASS INDEX: 23.8 KG/M2 | HEART RATE: 64 BPM | HEIGHT: 71 IN

## 2023-07-13 DIAGNOSIS — M54.50 LUMBAR PAIN: ICD-10-CM

## 2023-07-13 DIAGNOSIS — M54.2 CERVICAL PAIN: ICD-10-CM

## 2023-07-13 DIAGNOSIS — M43.12 SPONDYLOLISTHESIS, CERVICAL REGION: ICD-10-CM

## 2023-07-13 DIAGNOSIS — M51.36 DDD (DEGENERATIVE DISC DISEASE), LUMBAR: ICD-10-CM

## 2023-07-13 DIAGNOSIS — M47.812 CERVICAL SPONDYLOSIS WITHOUT MYELOPATHY: ICD-10-CM

## 2023-07-13 DIAGNOSIS — M47.816 SPONDYLOSIS WITHOUT MYELOPATHY OR RADICULOPATHY, LUMBAR REGION: ICD-10-CM

## 2023-07-13 DIAGNOSIS — M50.30 DDD (DEGENERATIVE DISC DISEASE), CERVICAL: Primary | ICD-10-CM

## 2023-07-13 DIAGNOSIS — M54.2 CERVICAL PAIN (NECK): ICD-10-CM

## 2023-07-13 PROCEDURE — 99214 OFFICE O/P EST MOD 30 MIN: CPT | Performed by: PHYSICAL MEDICINE & REHABILITATION

## 2023-07-13 PROCEDURE — 1123F ACP DISCUSS/DSCN MKR DOCD: CPT | Performed by: PHYSICAL MEDICINE & REHABILITATION

## 2023-07-13 NOTE — PROGRESS NOTES
pain>>low back pain). Multiple treatment options were discussed. I discussed increasing his gabapentin 300 mg TID to a higher dose, pt declined. Patient wished to continue with the current treatment plan. Patient does not need a refill of his gabapentin 300 mg TID, but I told him to call the office when he needs a refill of his Gabapentin 300 mg TID. Patient is neurologically intact. I will see the patient back in 6 month's time or earlier if needed. Written by Mal Arias, as dictated by Skye De Jesus MD  I examined the patient, reviewed and agree with the note.

## 2023-07-19 ENCOUNTER — OFFICE VISIT (OUTPATIENT)
Age: 88
End: 2023-07-19

## 2023-07-19 VITALS
OXYGEN SATURATION: 98 % | HEART RATE: 80 BPM | BODY MASS INDEX: 23.8 KG/M2 | HEIGHT: 71 IN | WEIGHT: 170 LBS | DIASTOLIC BLOOD PRESSURE: 70 MMHG | SYSTOLIC BLOOD PRESSURE: 115 MMHG

## 2023-07-19 DIAGNOSIS — I83.893 VARICOSE VEINS OF BILATERAL LOWER EXTREMITIES WITH OTHER COMPLICATIONS: Primary | ICD-10-CM

## 2023-09-28 ENCOUNTER — HOSPITAL ENCOUNTER (OUTPATIENT)
Facility: HOSPITAL | Age: 88
Setting detail: SPECIMEN
Discharge: HOME | End: 2023-09-28
Payer: MEDICARE

## 2023-09-28 DIAGNOSIS — E78.5 HYPERLIPIDEMIA, UNSPECIFIED HYPERLIPIDEMIA TYPE: ICD-10-CM

## 2023-09-28 LAB
ALBUMIN SERPL-MCNC: 3.4 G/DL (ref 3.4–5)
ALBUMIN/GLOB SERPL: 1.1 (ref 0.8–1.7)
ALP SERPL-CCNC: 54 U/L (ref 45–117)
ALT SERPL-CCNC: 15 U/L (ref 16–61)
ANION GAP SERPL CALC-SCNC: 1 MMOL/L (ref 3–18)
AST SERPL-CCNC: 14 U/L (ref 10–38)
BILIRUB SERPL-MCNC: 0.5 MG/DL (ref 0.2–1)
BUN SERPL-MCNC: 19 MG/DL (ref 7–18)
BUN/CREAT SERPL: 19 (ref 12–20)
CALCIUM SERPL-MCNC: 8.9 MG/DL (ref 8.5–10.1)
CHLORIDE SERPL-SCNC: 110 MMOL/L (ref 100–111)
CHOLEST SERPL-MCNC: 145 MG/DL
CO2 SERPL-SCNC: 30 MMOL/L (ref 21–32)
CREAT SERPL-MCNC: 1 MG/DL (ref 0.6–1.3)
ERYTHROCYTE [DISTWIDTH] IN BLOOD BY AUTOMATED COUNT: 14.3 % (ref 11.6–14.5)
GLOBULIN SER CALC-MCNC: 3.1 G/DL (ref 2–4)
GLUCOSE SERPL-MCNC: 88 MG/DL (ref 74–99)
HCT VFR BLD AUTO: 40.6 % (ref 36–48)
HDLC SERPL-MCNC: 73 MG/DL (ref 40–60)
HDLC SERPL: 2 (ref 0–5)
HGB BLD-MCNC: 13 G/DL (ref 13–16)
LDLC SERPL CALC-MCNC: 57.2 MG/DL (ref 0–100)
LIPID PANEL: ABNORMAL
MCH RBC QN AUTO: 31 PG (ref 24–34)
MCHC RBC AUTO-ENTMCNC: 32 G/DL (ref 31–37)
MCV RBC AUTO: 96.9 FL (ref 78–100)
NRBC # BLD: 0 K/UL (ref 0–0.01)
NRBC BLD-RTO: 0 PER 100 WBC
PLATELET # BLD AUTO: 177 K/UL (ref 135–420)
PMV BLD AUTO: 10.3 FL (ref 9.2–11.8)
POTASSIUM SERPL-SCNC: 4.5 MMOL/L (ref 3.5–5.5)
PROT SERPL-MCNC: 6.5 G/DL (ref 6.4–8.2)
RBC # BLD AUTO: 4.19 M/UL (ref 4.35–5.65)
SODIUM SERPL-SCNC: 141 MMOL/L (ref 136–145)
TRIGL SERPL-MCNC: 74 MG/DL
VLDLC SERPL CALC-MCNC: 14.8 MG/DL
WBC # BLD AUTO: 5.6 K/UL (ref 4.6–13.2)

## 2023-09-28 PROCEDURE — 80053 COMPREHEN METABOLIC PANEL: CPT

## 2023-09-28 PROCEDURE — 36415 COLL VENOUS BLD VENIPUNCTURE: CPT

## 2023-09-28 PROCEDURE — 80061 LIPID PANEL: CPT

## 2023-09-28 PROCEDURE — 85027 COMPLETE CBC AUTOMATED: CPT

## 2023-10-05 ENCOUNTER — OFFICE VISIT (OUTPATIENT)
Age: 88
End: 2023-10-05

## 2023-10-05 VITALS
OXYGEN SATURATION: 95 % | TEMPERATURE: 97.7 F | SYSTOLIC BLOOD PRESSURE: 134 MMHG | HEART RATE: 58 BPM | HEIGHT: 71 IN | BODY MASS INDEX: 24.92 KG/M2 | DIASTOLIC BLOOD PRESSURE: 71 MMHG | WEIGHT: 178 LBS | RESPIRATION RATE: 16 BRPM

## 2023-10-05 DIAGNOSIS — I83.893 VARICOSE VEINS OF BOTH LEGS WITH EDEMA: ICD-10-CM

## 2023-10-05 DIAGNOSIS — G62.9 NEUROPATHY: ICD-10-CM

## 2023-10-05 DIAGNOSIS — Z71.89 ADVANCED CARE PLANNING/COUNSELING DISCUSSION: ICD-10-CM

## 2023-10-05 DIAGNOSIS — M81.0 OSTEOPOROSIS, UNSPECIFIED OSTEOPOROSIS TYPE, UNSPECIFIED PATHOLOGICAL FRACTURE PRESENCE: ICD-10-CM

## 2023-10-05 DIAGNOSIS — Z00.00 MEDICARE ANNUAL WELLNESS VISIT, SUBSEQUENT: Primary | ICD-10-CM

## 2023-10-05 SDOH — ECONOMIC STABILITY: FOOD INSECURITY: WITHIN THE PAST 12 MONTHS, THE FOOD YOU BOUGHT JUST DIDN'T LAST AND YOU DIDN'T HAVE MONEY TO GET MORE.: NEVER TRUE

## 2023-10-05 SDOH — ECONOMIC STABILITY: INCOME INSECURITY: HOW HARD IS IT FOR YOU TO PAY FOR THE VERY BASICS LIKE FOOD, HOUSING, MEDICAL CARE, AND HEATING?: NOT HARD AT ALL

## 2023-10-05 SDOH — ECONOMIC STABILITY: HOUSING INSECURITY
IN THE LAST 12 MONTHS, WAS THERE A TIME WHEN YOU DID NOT HAVE A STEADY PLACE TO SLEEP OR SLEPT IN A SHELTER (INCLUDING NOW)?: NO

## 2023-10-05 SDOH — ECONOMIC STABILITY: FOOD INSECURITY: WITHIN THE PAST 12 MONTHS, YOU WORRIED THAT YOUR FOOD WOULD RUN OUT BEFORE YOU GOT MONEY TO BUY MORE.: NEVER TRUE

## 2023-10-05 ASSESSMENT — PATIENT HEALTH QUESTIONNAIRE - PHQ9
SUM OF ALL RESPONSES TO PHQ QUESTIONS 1-9: 0
SUM OF ALL RESPONSES TO PHQ QUESTIONS 1-9: 0
1. LITTLE INTEREST OR PLEASURE IN DOING THINGS: 0
SUM OF ALL RESPONSES TO PHQ QUESTIONS 1-9: 0
SUM OF ALL RESPONSES TO PHQ QUESTIONS 1-9: 0
SUM OF ALL RESPONSES TO PHQ9 QUESTIONS 1 & 2: 0
2. FEELING DOWN, DEPRESSED OR HOPELESS: 0

## 2023-10-06 NOTE — ACP (ADVANCE CARE PLANNING)
Advance Care Planning     Advance Care Planning (ACP) Physician/NP/[place] Conversation    Date of Conversation: 10/5/2023  Conducted with: Patient with Decision Making Capacity    Healthcare Decision Maker:      Primary Decision Maker: Clarice Crooks - [phone]    Click here to complete Healthcare Decision Makers including selection of the Healthcare Decision Maker Relationship (ie \"Primary\")  Today we documented Decision Maker(s) consistent with Legal Next of Kin hierarchy. Care Preferences:    Hospitalization: \"If your health worsens and it becomes clear that your chance of recovery is unlikely, what would be your preference regarding hospitalization? \"  The patient would prefer hospitalization. Ventilation: \"If you were unable to breath on your own and your chance of recovery was unlikely, what would be your preference about the use of a ventilator (breathing machine) if it was available to you? \"  The patient would desire the use of a ventilator. Resuscitation: \"In the event your heart stopped as a result of an underlying serious health condition, would you want attempts made to restart your heart, or would you prefer a natural death? \"  Yes, attempt to resuscitate.     ventilation preferences, hospitalization preferences, and resuscitation preferences    Conversation Outcomes / Follow-Up Plan:  ACP in process - information provided, considering goals and options  Reviewed DNR/DNI and patient elects Full Code (Attempt Resuscitation)    Length of Voluntary ACP Conversation in minutes:  16 minutes    Sydney Rowland MD

## 2023-10-19 ENCOUNTER — APPOINTMENT (OUTPATIENT)
Facility: HOSPITAL | Age: 88
End: 2023-10-19
Payer: MEDICARE

## 2023-10-20 ENCOUNTER — HOSPITAL ENCOUNTER (OUTPATIENT)
Facility: HOSPITAL | Age: 88
Setting detail: INFUSION SERIES
End: 2023-10-20
Payer: MEDICARE

## 2023-10-20 ENCOUNTER — TELEPHONE (OUTPATIENT)
Age: 88
End: 2023-10-20

## 2023-10-20 VITALS
SYSTOLIC BLOOD PRESSURE: 99 MMHG | HEART RATE: 61 BPM | DIASTOLIC BLOOD PRESSURE: 64 MMHG | RESPIRATION RATE: 16 BRPM | OXYGEN SATURATION: 95 % | TEMPERATURE: 97.6 F

## 2023-10-20 DIAGNOSIS — M81.0 OSTEOPOROSIS, UNSPECIFIED OSTEOPOROSIS TYPE, UNSPECIFIED PATHOLOGICAL FRACTURE PRESENCE: Primary | ICD-10-CM

## 2023-10-20 PROCEDURE — 6360000002 HC RX W HCPCS: Performed by: NURSE PRACTITIONER

## 2023-10-20 PROCEDURE — 36415 COLL VENOUS BLD VENIPUNCTURE: CPT

## 2023-10-20 PROCEDURE — 96372 THER/PROPH/DIAG INJ SC/IM: CPT

## 2023-10-20 RX ORDER — ONDANSETRON 2 MG/ML
8 INJECTION INTRAMUSCULAR; INTRAVENOUS
OUTPATIENT
Start: 2023-10-22

## 2023-10-20 RX ORDER — SODIUM CHLORIDE 9 MG/ML
INJECTION, SOLUTION INTRAVENOUS CONTINUOUS
OUTPATIENT
Start: 2023-10-22

## 2023-10-20 RX ORDER — ALBUTEROL SULFATE 90 UG/1
4 AEROSOL, METERED RESPIRATORY (INHALATION) PRN
OUTPATIENT
Start: 2023-10-22

## 2023-10-20 RX ORDER — EPINEPHRINE 1 MG/ML
0.3 INJECTION, SOLUTION, CONCENTRATE INTRAVENOUS PRN
OUTPATIENT
Start: 2023-10-22

## 2023-10-20 RX ORDER — ACETAMINOPHEN 325 MG/1
650 TABLET ORAL
OUTPATIENT
Start: 2023-10-22

## 2023-10-20 RX ORDER — DIPHENHYDRAMINE HYDROCHLORIDE 50 MG/ML
50 INJECTION INTRAMUSCULAR; INTRAVENOUS
OUTPATIENT
Start: 2023-10-22

## 2023-10-20 RX ADMIN — DENOSUMAB 60 MG: 60 INJECTION SUBCUTANEOUS at 14:35

## 2023-10-20 NOTE — TELEPHONE ENCOUNTER
Disabled parking placard application filled out and placed in [place] box.  Please advise once complete so we can scan and mail to patient

## 2023-10-23 ENCOUNTER — TELEPHONE (OUTPATIENT)
Age: 88
End: 2023-10-23

## 2023-10-23 NOTE — TELEPHONE ENCOUNTER
Pt called stating on 10/20 he had the prolia shot , he states on Saturday 10/21 and Sunday  his body hurt all over including his teeth  and jaw . this is his 4th time getting the injection , but he said he never reacted like this please advise

## 2023-10-24 NOTE — TELEPHONE ENCOUNTER
Called and spoke to patient, name and date of birth verified. Patient given Dr. Aris Thomas message and voiced understanding.

## 2023-11-27 ENCOUNTER — TELEPHONE (OUTPATIENT)
Age: 88
End: 2023-11-27

## 2023-11-27 NOTE — TELEPHONE ENCOUNTER
??  It's done every 2 years - last one was last year so not due until 3/24 at the earliest - will not be covered until then

## 2023-12-14 ENCOUNTER — TELEPHONE (OUTPATIENT)
Age: 88
End: 2023-12-14

## 2023-12-14 DIAGNOSIS — M54.2 CERVICAL PAIN (NECK): ICD-10-CM

## 2023-12-14 DIAGNOSIS — M51.36 DDD (DEGENERATIVE DISC DISEASE), LUMBAR: ICD-10-CM

## 2023-12-14 DIAGNOSIS — M50.30 DDD (DEGENERATIVE DISC DISEASE), CERVICAL: ICD-10-CM

## 2023-12-14 DIAGNOSIS — M43.12 SPONDYLOLISTHESIS, CERVICAL REGION: ICD-10-CM

## 2023-12-14 DIAGNOSIS — M47.812 CERVICAL SPONDYLOSIS WITHOUT MYELOPATHY: ICD-10-CM

## 2023-12-14 DIAGNOSIS — M54.2 CERVICAL PAIN: ICD-10-CM

## 2023-12-14 DIAGNOSIS — M47.816 SPONDYLOSIS WITHOUT MYELOPATHY OR RADICULOPATHY, LUMBAR REGION: ICD-10-CM

## 2023-12-14 DIAGNOSIS — M54.50 LUMBAR PAIN: ICD-10-CM

## 2023-12-14 RX ORDER — GABAPENTIN 300 MG/1
300 CAPSULE ORAL 3 TIMES DAILY
Qty: 270 CAPSULE | Refills: 0 | Status: SHIPPED | OUTPATIENT
Start: 2023-12-14 | End: 2024-03-13

## 2023-12-14 NOTE — TELEPHONE ENCOUNTER
Patient called and is asking for a refill on the Gabapentin medication from .    [facility] [address] in [place]  Tel. [phone]    Patient tel. [phone].    Note :   Patient next appt with  is on 01/18/2024.

## 2024-01-18 ENCOUNTER — OFFICE VISIT (OUTPATIENT)
Age: 89
End: 2024-01-18
Payer: MEDICARE

## 2024-01-18 VITALS
OXYGEN SATURATION: 100 % | DIASTOLIC BLOOD PRESSURE: 61 MMHG | SYSTOLIC BLOOD PRESSURE: 104 MMHG | HEIGHT: 71 IN | HEART RATE: 62 BPM | WEIGHT: 181 LBS | BODY MASS INDEX: 25.34 KG/M2

## 2024-01-18 DIAGNOSIS — M43.12 SPONDYLOLISTHESIS, CERVICAL REGION: ICD-10-CM

## 2024-01-18 DIAGNOSIS — M50.30 DDD (DEGENERATIVE DISC DISEASE), CERVICAL: ICD-10-CM

## 2024-01-18 DIAGNOSIS — M54.6 PAIN IN THORACIC SPINE: ICD-10-CM

## 2024-01-18 DIAGNOSIS — M47.816 FACET ARTHROPATHY, LUMBAR: ICD-10-CM

## 2024-01-18 DIAGNOSIS — M47.812 CERVICAL SPONDYLOSIS WITHOUT MYELOPATHY: Primary | ICD-10-CM

## 2024-01-18 DIAGNOSIS — M51.36 DDD (DEGENERATIVE DISC DISEASE), LUMBAR: ICD-10-CM

## 2024-01-18 PROCEDURE — 1123F ACP DISCUSS/DSCN MKR DOCD: CPT | Performed by: PHYSICAL MEDICINE & REHABILITATION

## 2024-01-18 PROCEDURE — 99214 OFFICE O/P EST MOD 30 MIN: CPT | Performed by: PHYSICAL MEDICINE & REHABILITATION

## 2024-01-18 RX ORDER — GABAPENTIN 300 MG/1
300 CAPSULE ORAL 3 TIMES DAILY
Qty: 270 CAPSULE | Refills: 1 | Status: SHIPPED | OUTPATIENT
Start: 2024-01-18 | End: 2024-07-16

## 2024-01-18 RX ORDER — AMMONIUM LACTATE 12 G/100G
CREAM TOPICAL
COMMUNITY
Start: 2023-12-05

## 2024-01-18 RX ORDER — LOTILANER OPHTHALMIC SOLUTION 2.5 MG/ML
SOLUTION/ DROPS OPHTHALMIC
COMMUNITY
Start: 2024-01-15

## 2024-01-18 RX ORDER — PERFLUOROHEXYLOCTANE 1 MG/MG
SOLUTION OPHTHALMIC
COMMUNITY
Start: 2024-01-15

## 2024-01-18 RX ORDER — FLUOROURACIL 50 MG/G
CREAM TOPICAL
COMMUNITY
Start: 2023-12-05

## 2024-01-18 NOTE — PROGRESS NOTES
[facility]  [address]  [place] [id number]  Tel: [phone]  Fax: [phone]          PROGRESS NOTE      HISTORY OF PRESENT ILLNESS:  The patient is a 89 y.o. male and was seen today for follow up of minimal low back pain, left sided neck pain radiating to the left shoulder, and bilateral foot paresthesia (neck pain>>low back pain). Previously seen for low back pain with paresthesia in his bilateral feet and new complaint of left sided neck pain to the left shoulder w/o radicular symptoms. Previously seen for increase in T spine pain after taking care of his wife over the past weekend. Previously seen for an increase in low back pain since  4/25/2022 after moving a flower pot.  He noted lower back pain radiates into his sides, pain will also occasionally radiate into his abdominal/rib area. Previously seen for progressive thoracolumbar  junction pain radiating bilaterally to the ribs.x 12/5/2021 without injury, exact distribution unclear Denies radicular sxs. Previously seen for  paraesthesias in the RLE from the knee down to the foot x 2 weeks. He reports his initial  back sxs have settled down. Initially had c/o  acute onset of left-sided low back pain since the beginning of May 2019. Pt reports onset of sxs after bending down to  a board.  He states his pain has improved since onset. His neck pain is exacerbated by rotating to the left or right and cervical extension. Positive shopping cart sign. Reviewing the  records, it appears as though the pt has had some chronic issues with his low back, as he is on Neurontin. Pt denies h/o spinal surgery or PT. Pt reports having blocks in 2014 with relief. He has been treated with MDP with relief and Tylenol #3 without  relief. He self-treats with Motrin prn.  Pt denies change in bowel or bladder habits. Pt denies fever, weight loss, or skin changes. The patient is  RHD. Previously seen by Dr. Ronquillo

## 2024-01-22 ENCOUNTER — TELEPHONE (OUTPATIENT)
Age: 89
End: 2024-01-22

## 2024-01-22 NOTE — TELEPHONE ENCOUNTER
IMPRESSION AND PLAN:  Patient returns to the office today with c/o minimal low back pain, left sided neck pain radiating to the left shoulder, and bilateral foot paresthesia (neck pain>>low back pain). Multiple treatment options were discussed. I discussed increasing his Gabapentin, pt declined. I refilled his Gabapentin 300 mg TID. I offered referring him to PT, pt declined.  Patient is neurologically intact.  I will see the patient back in 6 months or earlier if needed.       I spoke to  using two patient identifiers (name and ). I let him know that  did not make any changes to his prescriptions and he only sent in a refill. I let him know that where he see changes on his after visit summary is due to his medication list being cleaned up during triage. I let him know to continue to take his gabapentin as prescribed. He verbalized understanding and had no further questions or concerns at this time.

## 2024-01-22 NOTE — TELEPHONE ENCOUNTER
Patient called in and stated that Dr. Scales changed his medication at his last visit but says he doesn't see any indication of that change documented in his office visit summary.    Please contact him back with clarification. He's requesting a call back at [phone].

## 2024-03-07 ENCOUNTER — TELEPHONE (OUTPATIENT)
Age: 89
End: 2024-03-07

## 2024-03-07 NOTE — TELEPHONE ENCOUNTER
Patient called and is asking for a refill on the Gabapentin 300 mg medication from .    [facility] tel. [phone]    Patient tel. [phone].

## 2024-03-07 NOTE — TELEPHONE ENCOUNTER
The patient called [facility] yesterday and the representative told him he didn't have a refill so I said I would call the pharmacy for him.    I called to [facility] today and received the Tracking Number of [id number] for the patient's Gabapentin prescription.    I called the patient back and let him know it is on way with the Tracking Number and he was happy to hear.    No further actions needed.

## 2024-04-22 ENCOUNTER — HOSPITAL ENCOUNTER (OUTPATIENT)
Facility: HOSPITAL | Age: 89
Setting detail: INFUSION SERIES
End: 2024-04-22
Payer: MEDICARE

## 2024-04-23 ENCOUNTER — TELEPHONE (OUTPATIENT)
Age: 89
End: 2024-04-23

## 2024-04-23 DIAGNOSIS — Z87.81 HISTORY OF COMPRESSION FRACTURE OF SPINE: ICD-10-CM

## 2024-04-23 DIAGNOSIS — M81.0 AGE-RELATED OSTEOPOROSIS WITHOUT CURRENT PATHOLOGICAL FRACTURE: Primary | ICD-10-CM

## 2024-04-23 NOTE — TELEPHONE ENCOUNTER
I called the pt and verified with 2 pt identifiers. Pt is aware the Prolia has been sent into the pharmacy and that NP Melissa Sam has put in for a new DEXA Scan. Pt is aware he will be called to get scheduled. Pt will call the office if he has any questions or concerns.

## 2024-04-23 NOTE — TELEPHONE ENCOUNTER
I am sending in the prolia, but pt needs a new bone dexa scan and I have put in the order for it.  Please let him know.

## 2024-04-24 ENCOUNTER — HOSPITAL ENCOUNTER (OUTPATIENT)
Facility: HOSPITAL | Age: 89
Setting detail: INFUSION SERIES
Discharge: HOME | End: 2024-04-27
Payer: MEDICARE

## 2024-04-24 VITALS
OXYGEN SATURATION: 95 % | HEART RATE: 52 BPM | DIASTOLIC BLOOD PRESSURE: 66 MMHG | RESPIRATION RATE: 16 BRPM | TEMPERATURE: 98.3 F | SYSTOLIC BLOOD PRESSURE: 115 MMHG

## 2024-04-24 LAB
ALBUMIN SERPL-MCNC: 3.1 G/DL (ref 3.4–5)
ANION GAP SERPL CALC-SCNC: 2 MMOL/L (ref 3–18)
BUN SERPL-MCNC: 21 MG/DL (ref 7–18)
BUN/CREAT SERPL: 21 (ref 12–20)
CALCIUM SERPL-MCNC: 8.8 MG/DL (ref 8.5–10.1)
CHLORIDE SERPL-SCNC: 109 MMOL/L (ref 100–111)
CO2 SERPL-SCNC: 30 MMOL/L (ref 21–32)
CREAT SERPL-MCNC: 1.01 MG/DL (ref 0.6–1.3)
GLUCOSE SERPL-MCNC: 83 MG/DL (ref 74–99)
MAGNESIUM SERPL-MCNC: 2.5 MG/DL (ref 1.6–2.6)
PHOSPHATE SERPL-MCNC: 3.4 MG/DL (ref 2.5–4.9)
POTASSIUM SERPL-SCNC: 4.8 MMOL/L (ref 3.5–5.5)
SODIUM SERPL-SCNC: 141 MMOL/L (ref 136–145)

## 2024-04-24 PROCEDURE — 83735 ASSAY OF MAGNESIUM: CPT

## 2024-04-24 PROCEDURE — 36415 COLL VENOUS BLD VENIPUNCTURE: CPT

## 2024-04-24 PROCEDURE — 80069 RENAL FUNCTION PANEL: CPT

## 2024-04-24 RX ORDER — DIPHENHYDRAMINE HYDROCHLORIDE 50 MG/ML
50 INJECTION INTRAMUSCULAR; INTRAVENOUS
Status: CANCELLED | OUTPATIENT
Start: 2024-04-24

## 2024-04-24 RX ORDER — ALBUTEROL SULFATE 90 UG/1
4 AEROSOL, METERED RESPIRATORY (INHALATION) PRN
Status: CANCELLED | OUTPATIENT
Start: 2024-04-24

## 2024-04-24 RX ORDER — ACETAMINOPHEN 325 MG/1
650 TABLET ORAL
Status: CANCELLED | OUTPATIENT
Start: 2024-04-24

## 2024-04-24 RX ORDER — ONDANSETRON 2 MG/ML
8 INJECTION INTRAMUSCULAR; INTRAVENOUS
Status: CANCELLED | OUTPATIENT
Start: 2024-04-24

## 2024-04-24 RX ORDER — SODIUM CHLORIDE 9 MG/ML
INJECTION, SOLUTION INTRAVENOUS CONTINUOUS
Status: CANCELLED | OUTPATIENT
Start: 2024-04-24

## 2024-04-24 RX ORDER — EPINEPHRINE 1 MG/ML
0.3 INJECTION, SOLUTION, CONCENTRATE INTRAVENOUS PRN
Status: CANCELLED | OUTPATIENT
Start: 2024-04-24

## 2024-04-24 ASSESSMENT — PAIN - FUNCTIONAL ASSESSMENT: PAIN_FUNCTIONAL_ASSESSMENT: NONE - DENIES PAIN

## 2024-04-24 NOTE — PROGRESS NOTES
[facility] Progress Note    Date: 2024    Name: Andrea Mireles Jr    MRN: [id number]         : 1930    Mr. Aline Lim arrived in the [facility] today at 1300, in stable condition, here for his PRE-PROLIA LABS. He was assessed and education was provided.     Mr. Aline Lim's vitals were reviewed.  Vitals:    24 1300   BP: 115/66   Pulse: 52   Resp: 16   Temp: 98.3 °F (36.8 °C)   SpO2: 95%         Blood for the ordered labs (MAGNESIUM + RENAL FUNCTION PANEL, which contains a Phosphorus) was drawn from his LEFT AC at 1312 without incident, and was sent out by  to the [facility] hospital lab for processing.           Mr. Aline Lim tolerated well and voiced no complaints.     [facility] Aline [facility] condition at 1315.     He is to return on tomorrow, Thursday, 24 at 1300, for his next appointment, to receive his PROLIA injection.     Jeanne Whitten RN  2024  1:12 PM

## 2024-04-25 ENCOUNTER — HOSPITAL ENCOUNTER (OUTPATIENT)
Facility: HOSPITAL | Age: 89
Setting detail: INFUSION SERIES
End: 2024-04-25
Payer: MEDICARE

## 2024-04-25 VITALS
RESPIRATION RATE: 18 BRPM | OXYGEN SATURATION: 96 % | SYSTOLIC BLOOD PRESSURE: 141 MMHG | DIASTOLIC BLOOD PRESSURE: 77 MMHG | TEMPERATURE: 97.4 F | HEART RATE: 66 BPM

## 2024-04-25 DIAGNOSIS — M81.0 OSTEOPOROSIS, UNSPECIFIED OSTEOPOROSIS TYPE, UNSPECIFIED PATHOLOGICAL FRACTURE PRESENCE: Primary | ICD-10-CM

## 2024-04-25 PROCEDURE — 6360000002 HC RX W HCPCS: Performed by: NURSE PRACTITIONER

## 2024-04-25 PROCEDURE — 96372 THER/PROPH/DIAG INJ SC/IM: CPT

## 2024-04-25 RX ORDER — SODIUM CHLORIDE 9 MG/ML
INJECTION, SOLUTION INTRAVENOUS CONTINUOUS
OUTPATIENT
Start: 2024-10-20

## 2024-04-25 RX ORDER — ONDANSETRON 2 MG/ML
8 INJECTION INTRAMUSCULAR; INTRAVENOUS
OUTPATIENT
Start: 2024-10-20

## 2024-04-25 RX ORDER — DIPHENHYDRAMINE HYDROCHLORIDE 50 MG/ML
50 INJECTION INTRAMUSCULAR; INTRAVENOUS
OUTPATIENT
Start: 2024-10-20

## 2024-04-25 RX ORDER — ALBUTEROL SULFATE 90 UG/1
4 AEROSOL, METERED RESPIRATORY (INHALATION) PRN
OUTPATIENT
Start: 2024-10-20

## 2024-04-25 RX ORDER — ACETAMINOPHEN 325 MG/1
650 TABLET ORAL
OUTPATIENT
Start: 2024-10-20

## 2024-04-25 RX ORDER — EPINEPHRINE 1 MG/ML
0.3 INJECTION, SOLUTION, CONCENTRATE INTRAVENOUS PRN
OUTPATIENT
Start: 2024-10-20

## 2024-04-25 RX ADMIN — DENOSUMAB 60 MG: 60 INJECTION SUBCUTANEOUS at 13:12

## 2024-04-25 NOTE — PROGRESS NOTES
[facility] Progress Note    Date: 2024    Name: Andrea Mireles Jr    MRN: [id number]         : 1930      PROLIA Q6 MONTHS      Mr. Aline Lim arrived to [facility] at 1300 in stable condition. Pt is ambulatory without assist.    Mr. Aline Lim was assessed and education was provided.     Mr. Aline Lim's vitals were reviewed.  Vitals:    24 1300   BP: (!) 141/77   Pulse: 66   Resp: 18   Temp: 97.4 °F (36.3 °C)   TempSrc: Oral   SpO2: 96%       Labs (Magnesium & Renal Function Panel) that were drawn yesterday were reviewed):   Latest Reference Range & Units 24 13:12   Sodium 136 - 145 mmol/L 141   Potassium 3.5 - 5.5 mmol/L 4.8   Chloride 100 - 111 mmol/L 109   CARBON DIOXIDE 21 - 32 mmol/L 30   BUN,BUNPL 7.0 - 18 MG/DL 21 (H)   Creatinine 0.6 - 1.3 MG/DL 1.01   Bun/Cre Ratio 12 - 20   21 (H)   Anion Gap 3.0 - 18 mmol/L 2 (L)   Est, Glom Filt Rate >60 ml/min/1.73m2 69   Magnesium 1.6 - 2.6 mg/dL 2.5   Glucose, Random 74 - 99 mg/dL 83   Calcium, Total 8.5 - 10.1 MG/DL 8.8   Phosphorus 2.5 - 4.9 MG/DL 3.4   Albumin 3.4 - 5.0 g/dL 3.1 (L)   (H): Data is abnormally high  (L): Data is abnormally low      Prolia 60mg was administered as ordered SQ in patient's RIGHT upper arm. Band-aid applied to site.     Mr. Aline Lim tolerated the injection well without complaints.    Discharge/ follow-up instructions discussed with patient. Patient verbalized understanding.    Pt armband removed & shredded.    [facility] ambulatory and in stable condition at 1320. He is to return on Wednesday, 10/23/24, at 1300, for pre-prolia labs & on Friday, 10/25/24, at 1300, for his next Prolia injection.     Mulu Fernandes RN  2024  1:30 PM

## 2024-05-07 ENCOUNTER — TELEPHONE (OUTPATIENT)
Age: 89
End: 2024-05-07

## 2024-05-07 NOTE — TELEPHONE ENCOUNTER
Pt scheduled w/ on call provider tomorrow  for rib and left shoulder pain advised Pt if symptoms worsen go to ED

## 2024-05-08 ENCOUNTER — OFFICE VISIT (OUTPATIENT)
Age: 89
End: 2024-05-08
Payer: MEDICARE

## 2024-05-08 ENCOUNTER — HOSPITAL ENCOUNTER (OUTPATIENT)
Facility: HOSPITAL | Age: 89
Discharge: HOME | End: 2024-05-11
Payer: MEDICARE

## 2024-05-08 VITALS
DIASTOLIC BLOOD PRESSURE: 68 MMHG | BODY MASS INDEX: 25.06 KG/M2 | SYSTOLIC BLOOD PRESSURE: 129 MMHG | RESPIRATION RATE: 18 BRPM | TEMPERATURE: 98 F | WEIGHT: 179 LBS | HEIGHT: 71 IN | HEART RATE: 56 BPM | OXYGEN SATURATION: 97 %

## 2024-05-08 DIAGNOSIS — K21.9 GASTROESOPHAGEAL REFLUX DISEASE, UNSPECIFIED WHETHER ESOPHAGITIS PRESENT: ICD-10-CM

## 2024-05-08 DIAGNOSIS — R07.81 RIB PAIN: ICD-10-CM

## 2024-05-08 DIAGNOSIS — R07.81 RIB PAIN: Primary | ICD-10-CM

## 2024-05-08 PROCEDURE — 71046 X-RAY EXAM CHEST 2 VIEWS: CPT

## 2024-05-08 PROCEDURE — 1123F ACP DISCUSS/DSCN MKR DOCD: CPT | Performed by: INTERNAL MEDICINE

## 2024-05-08 PROCEDURE — 99213 OFFICE O/P EST LOW 20 MIN: CPT | Performed by: INTERNAL MEDICINE

## 2024-05-08 RX ORDER — LIDOCAINE 50 MG/G
PATCH TOPICAL
Qty: 30 PATCH | Refills: 0 | Status: SHIPPED | OUTPATIENT
Start: 2024-05-08

## 2024-05-08 RX ORDER — OMEPRAZOLE 20 MG/1
20 CAPSULE, DELAYED RELEASE ORAL
Qty: 90 CAPSULE | Refills: 1 | Status: SHIPPED | OUTPATIENT
Start: 2024-05-08

## 2024-05-08 SDOH — ECONOMIC STABILITY: FOOD INSECURITY: WITHIN THE PAST 12 MONTHS, THE FOOD YOU BOUGHT JUST DIDN'T LAST AND YOU DIDN'T HAVE MONEY TO GET MORE.: NEVER TRUE

## 2024-05-08 SDOH — ECONOMIC STABILITY: FOOD INSECURITY: WITHIN THE PAST 12 MONTHS, YOU WORRIED THAT YOUR FOOD WOULD RUN OUT BEFORE YOU GOT MONEY TO BUY MORE.: NEVER TRUE

## 2024-05-08 SDOH — ECONOMIC STABILITY: INCOME INSECURITY: HOW HARD IS IT FOR YOU TO PAY FOR THE VERY BASICS LIKE FOOD, HOUSING, MEDICAL CARE, AND HEATING?: NOT HARD AT ALL

## 2024-05-08 ASSESSMENT — PATIENT HEALTH QUESTIONNAIRE - PHQ9
2. FEELING DOWN, DEPRESSED OR HOPELESS: NOT AT ALL
SUM OF ALL RESPONSES TO PHQ QUESTIONS 1-9: 0
SUM OF ALL RESPONSES TO PHQ9 QUESTIONS 1 & 2: 0
SUM OF ALL RESPONSES TO PHQ QUESTIONS 1-9: 0
1. LITTLE INTEREST OR PLEASURE IN DOING THINGS: NOT AT ALL

## 2024-05-08 NOTE — PROGRESS NOTES
Andrea Mireles Jr (: 1930) is a 89 y.o. male, here for evaluation of the following chief complaint(s):  Chest Pain (Rib pain x 2 months)       ASSESSMENT/PLAN:  Below is the assessment and plan developed based on review of pertinent history, physical exam, labs, studies, and medications.    1. Rib pain  -     XR CHEST (2 VIEWS); Future  -     lidocaine (LIDODERM) 5 %; Apply patch to the affected area for 12 hours a day and remove for 12 hours a day., Disp-30 patch, R-0Normal  2. Gastroesophageal reflux disease, unspecified whether esophagitis present  -     omeprazole (PRILOSEC) 20 MG delayed release capsule; Take 1 capsule by mouth every morning (before breakfast), Disp-90 capsule, R-1Normal      Return in about 1 month (around 2024) for follow up on chronic conditions.     SUBJECTIVE/OBJECTIVE:  Chest Pain       Patient is complaining of indigestion and pain in the left thoracic region.  For last several weeks.    Patient also complains of acid reflux.  No nausea/vomiting.  No abdominal pain.    For retrosternal chest pain or shortness of breath or palpitations.    Patient has history of osteoporosis and follows with rheumatology.    Patient mentioned   That he lost his wife recently.    ROS as above  Vitals:    24 1131   BP: 129/68   Pulse: 56   Resp: 18   Temp: 98 °F (36.7 °C)   TempSrc: Temporal   SpO2: 97%   Weight: 81.2 kg (179 lb)   Height: 1.803 m (5' 11\")     Physical Exam  Constitutional:       Appearance: Normal appearance.   HENT:      Head: Normocephalic and atraumatic.      Nose: Nose normal.      Mouth/Throat:      Mouth: Mucous membranes are moist.   Eyes:      Extraocular Movements: Extraocular movements intact.      Pupils: Pupils are equal, round, and reactive to light.   Cardiovascular:      Rate and Rhythm: Normal rate and regular rhythm.      Heart sounds: Normal heart sounds.   Pulmonary:      Breath sounds: Normal breath sounds.      Comments: Mild tenderness noted in the

## 2024-05-08 NOTE — PROGRESS NOTES
\"Have you been to the ER, urgent care clinic since your last visit?  Hospitalized since your last visit?\"    NO    “Have you seen or consulted any other health care providers outside of [facility] since your last visit?”    NO

## 2024-05-10 ENCOUNTER — TELEPHONE (OUTPATIENT)
Age: 89
End: 2024-05-10

## 2024-05-10 NOTE — TELEPHONE ENCOUNTER
I called the patient and advised. Patient verbalized understanding. Appt has been made with Dr. Zee .

## 2024-05-10 NOTE — TELEPHONE ENCOUNTER
----- Message from Nino Sage MD sent at 5/8/2024  3:41 PM EDT -----  Please inform the patient that chest x-ray did not show any acute cardiopulmonary process.  It does show osteopenia and compression deformities in the lower thoracic spine.  To continue current management for now.  Follow-up with PCP in 1 month.  Thanks

## 2024-05-17 ENCOUNTER — HOSPITAL ENCOUNTER (OUTPATIENT)
Facility: HOSPITAL | Age: 89
End: 2024-05-17
Payer: MEDICARE

## 2024-05-17 DIAGNOSIS — M81.0 AGE-RELATED OSTEOPOROSIS WITHOUT CURRENT PATHOLOGICAL FRACTURE: ICD-10-CM

## 2024-05-17 DIAGNOSIS — Z87.81 HISTORY OF COMPRESSION FRACTURE OF SPINE: ICD-10-CM

## 2024-05-17 PROCEDURE — 77080 DXA BONE DENSITY AXIAL: CPT

## 2024-05-24 ENCOUNTER — TELEPHONE (OUTPATIENT)
Age: 89
End: 2024-05-24

## 2024-05-24 NOTE — TELEPHONE ENCOUNTER
I tried to contact the pt regarding his bone density results. He was not able to be reached. A message was left for the pt on the home number asking him to contact the office at his convenience regarding his bone density orders. The number to the office was provided. I called the mobile number and was not able to leave a voicemail. Mailbox is full.

## 2024-05-24 NOTE — TELEPHONE ENCOUNTER
----- Message from SOFI Strickland NP sent at 5/23/2024  4:59 PM EDT -----  Let pt know that his bone density still shows osteoporosis, but has improved overall with the Prolia.  Continue Prolia.

## 2024-05-28 NOTE — TELEPHONE ENCOUNTER
I called and spoke to the pt. The pt was identified using 2 pt identifiers. He was notified that his bone density test shows osteoporosis but that it does show improvement. At this time, the provider recommends that he continue taking the prolia. He verbalized understanding and has no questions or concerns at this time.

## 2024-05-30 ENCOUNTER — APPOINTMENT (OUTPATIENT)
Facility: HOSPITAL | Age: 89
End: 2024-05-30
Payer: MEDICARE

## 2024-05-30 ENCOUNTER — HOSPITAL ENCOUNTER (EMERGENCY)
Facility: HOSPITAL | Age: 89
Discharge: HOME | End: 2024-05-30
Attending: EMERGENCY MEDICINE
Payer: MEDICARE

## 2024-05-30 VITALS
TEMPERATURE: 97 F | WEIGHT: 179 LBS | SYSTOLIC BLOOD PRESSURE: 133 MMHG | OXYGEN SATURATION: 98 % | RESPIRATION RATE: 18 BRPM | HEART RATE: 65 BPM | DIASTOLIC BLOOD PRESSURE: 65 MMHG | HEIGHT: 70 IN | BODY MASS INDEX: 25.62 KG/M2

## 2024-05-30 DIAGNOSIS — R00.1 BRADYCARDIA: ICD-10-CM

## 2024-05-30 DIAGNOSIS — I49.3 FREQUENT PVCS: Primary | ICD-10-CM

## 2024-05-30 LAB
ANION GAP SERPL CALC-SCNC: 3 MMOL/L (ref 3–18)
BASOPHILS # BLD: 0 K/UL (ref 0–0.1)
BASOPHILS NFR BLD: 0 % (ref 0–2)
BUN SERPL-MCNC: 20 MG/DL (ref 7–18)
BUN/CREAT SERPL: 19 (ref 12–20)
CALCIUM SERPL-MCNC: 8.7 MG/DL (ref 8.5–10.1)
CHLORIDE SERPL-SCNC: 111 MMOL/L (ref 100–111)
CO2 SERPL-SCNC: 26 MMOL/L (ref 21–32)
CREAT SERPL-MCNC: 1.03 MG/DL (ref 0.6–1.3)
DIFFERENTIAL METHOD BLD: NORMAL
EKG ATRIAL RATE: 73 BPM
EKG DIAGNOSIS: NORMAL
EKG P AXIS: 67 DEGREES
EKG Q-T INTERVAL: 436 MS
EKG QRS DURATION: 136 MS
EKG QTC CALCULATION (BAZETT): 477 MS
EKG R AXIS: 55 DEGREES
EKG T AXIS: 54 DEGREES
EKG VENTRICULAR RATE: 72 BPM
EOSINOPHIL # BLD: 0.1 K/UL (ref 0–0.4)
EOSINOPHIL NFR BLD: 1 % (ref 0–5)
ERYTHROCYTE [DISTWIDTH] IN BLOOD BY AUTOMATED COUNT: 13.7 % (ref 11.6–14.5)
GLUCOSE SERPL-MCNC: 114 MG/DL (ref 74–99)
HCT VFR BLD AUTO: 40.9 % (ref 36–48)
HGB BLD-MCNC: 13.7 G/DL (ref 13–16)
IMM GRANULOCYTES # BLD AUTO: 0 K/UL (ref 0–0.04)
IMM GRANULOCYTES NFR BLD AUTO: 0 % (ref 0–0.5)
LYMPHOCYTES # BLD: 2 K/UL (ref 0.9–3.6)
LYMPHOCYTES NFR BLD: 34 % (ref 21–52)
MAGNESIUM SERPL-MCNC: 2.3 MG/DL (ref 1.6–2.6)
MCH RBC QN AUTO: 31.5 PG (ref 24–34)
MCHC RBC AUTO-ENTMCNC: 33.5 G/DL (ref 31–37)
MCV RBC AUTO: 94 FL (ref 78–100)
MONOCYTES # BLD: 0.5 K/UL (ref 0.05–1.2)
MONOCYTES NFR BLD: 8 % (ref 3–10)
NEUTS SEG # BLD: 3.4 K/UL (ref 1.8–8)
NEUTS SEG NFR BLD: 57 % (ref 40–73)
NRBC # BLD: 0 K/UL (ref 0–0.01)
NRBC BLD-RTO: 0 PER 100 WBC
PLATELET # BLD AUTO: 178 K/UL (ref 135–420)
PMV BLD AUTO: 9.9 FL (ref 9.2–11.8)
POTASSIUM SERPL-SCNC: 5.1 MMOL/L (ref 3.5–5.5)
RBC # BLD AUTO: 4.35 M/UL (ref 4.35–5.65)
SODIUM SERPL-SCNC: 140 MMOL/L (ref 136–145)
TROPONIN I SERPL HS-MCNC: 7 NG/L (ref 0–78)
WBC # BLD AUTO: 5.9 K/UL (ref 4.6–13.2)

## 2024-05-30 PROCEDURE — 93010 ELECTROCARDIOGRAM REPORT: CPT | Performed by: INTERNAL MEDICINE

## 2024-05-30 PROCEDURE — 83735 ASSAY OF MAGNESIUM: CPT

## 2024-05-30 PROCEDURE — 80048 BASIC METABOLIC PNL TOTAL CA: CPT

## 2024-05-30 PROCEDURE — 99285 EMERGENCY DEPT VISIT HI MDM: CPT

## 2024-05-30 PROCEDURE — 84484 ASSAY OF TROPONIN QUANT: CPT

## 2024-05-30 PROCEDURE — 71045 X-RAY EXAM CHEST 1 VIEW: CPT

## 2024-05-30 PROCEDURE — 85025 COMPLETE CBC W/AUTO DIFF WBC: CPT

## 2024-05-30 PROCEDURE — 93005 ELECTROCARDIOGRAM TRACING: CPT | Performed by: EMERGENCY MEDICINE

## 2024-05-30 ASSESSMENT — PAIN SCALES - GENERAL: PAINLEVEL_OUTOF10: 1

## 2024-05-30 ASSESSMENT — PAIN DESCRIPTION - LOCATION: LOCATION: HEAD

## 2024-05-30 ASSESSMENT — PAIN - FUNCTIONAL ASSESSMENT: PAIN_FUNCTIONAL_ASSESSMENT: 0-10

## 2024-05-30 ASSESSMENT — LIFESTYLE VARIABLES: HOW OFTEN DO YOU HAVE A DRINK CONTAINING ALCOHOL: NEVER

## 2024-05-30 NOTE — ED PROVIDER NOTES
[facility] EMERGENCY DEPT  eMERGENCY dEPARTMENT eNCOUnter        Pt Name: Andrea Mireles Jr  MRN: [id number]  Birthdate 1/31/1930  Date of evaluation: 5/30/2024  Provider: Guy Edmond MD  PCP: Jw Zee MD  Note Started: 12:15 PM EDT 5/30/2024          CHIEF COMPLAINT       Chief Complaint   Patient presents with    Bradycardia       HISTORY OF PRESENT ILLNESS        Patient coming in with concerns for low heart rate over the last day or 2.  Patient has some recurrent episodes of lightheadedness with positional change or standing.  Told his family member that he was feeling a bit off over the last day.  She placed a pulse ox on his finger and the heart rate was in the mid 30s.  Home health nurse came in today and checked the patient's heart rate manually and reported that it was also in the 30s and recommended that he come into the emergency department.  Patient states that he would not of come to the emergency department if not directed to do so because he is not feeling significantly bad.  The lightheadedness has been something chronic and recurrent.  He has been placed on a new medication for dry eyes.  No topical beta-blockers by report.  No other medication changes.  Patient denies chest pain or shortness of breath.  No nausea, vomiting or diarrhea.  Reports good oral intake.      Nursing Notes were all reviewed and agreed with or any disagreements were addressed  in the HPI.    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

## 2024-05-30 NOTE — DISCHARGE INSTRUCTIONS
As we discussed, your heart rate was within the normal limits in the emergency department while being monitored.  You do have frequent PVCs on your EKG.  You should follow-up with primary care as soon as possible for reassessment and referral to cardiology.  Please return for any chest pain, shortness of breath, lightheadedness/fainting, or if concerned

## 2024-05-30 NOTE — ED TRIAGE NOTES
Pt to ED for eval after having heartrate in the 30s x 2 days. Pt reports some dizziness and fatigue x 2 days.

## 2024-05-30 NOTE — ED NOTES
1254- Patient resting in stretcher. Daughter at bedside. Patient and family updated on plan of care.     0415- Discharge instructions reviewed with the patient and daughter. Patient in stable condition at discharge.

## 2024-06-19 ENCOUNTER — OFFICE VISIT (OUTPATIENT)
Age: 89
End: 2024-06-19
Payer: MEDICARE

## 2024-06-19 VITALS
DIASTOLIC BLOOD PRESSURE: 82 MMHG | BODY MASS INDEX: 25.91 KG/M2 | SYSTOLIC BLOOD PRESSURE: 124 MMHG | HEIGHT: 70 IN | OXYGEN SATURATION: 95 % | WEIGHT: 181 LBS | HEART RATE: 69 BPM

## 2024-06-19 DIAGNOSIS — R00.1 BRADYCARDIA: ICD-10-CM

## 2024-06-19 DIAGNOSIS — R94.31 ABNORMAL EKG: ICD-10-CM

## 2024-06-19 DIAGNOSIS — I45.10 RIGHT BUNDLE BRANCH BLOCK: ICD-10-CM

## 2024-06-19 DIAGNOSIS — I49.3 PVC (PREMATURE VENTRICULAR CONTRACTION): Primary | ICD-10-CM

## 2024-06-19 PROCEDURE — 93000 ELECTROCARDIOGRAM COMPLETE: CPT | Performed by: INTERNAL MEDICINE

## 2024-06-19 PROCEDURE — 1123F ACP DISCUSS/DSCN MKR DOCD: CPT | Performed by: INTERNAL MEDICINE

## 2024-06-19 PROCEDURE — 99204 OFFICE O/P NEW MOD 45 MIN: CPT | Performed by: INTERNAL MEDICINE

## 2024-06-19 ASSESSMENT — ENCOUNTER SYMPTOMS
NAUSEA: 0
VOMITING: 0
ABDOMINAL PAIN: 0
ABDOMINAL DISTENTION: 0
COUGH: 0
SHORTNESS OF BREATH: 0
SORE THROAT: 0

## 2024-06-19 ASSESSMENT — PATIENT HEALTH QUESTIONNAIRE - PHQ9
1. LITTLE INTEREST OR PLEASURE IN DOING THINGS: NOT AT ALL
SUM OF ALL RESPONSES TO PHQ QUESTIONS 1-9: 0
SUM OF ALL RESPONSES TO PHQ9 QUESTIONS 1 & 2: 0
2. FEELING DOWN, DEPRESSED OR HOPELESS: NOT AT ALL
SUM OF ALL RESPONSES TO PHQ QUESTIONS 1-9: 0

## 2024-06-19 ASSESSMENT — ANXIETY QUESTIONNAIRES
3. WORRYING TOO MUCH ABOUT DIFFERENT THINGS: NOT AT ALL
7. FEELING AFRAID AS IF SOMETHING AWFUL MIGHT HAPPEN: NOT AT ALL
4. TROUBLE RELAXING: NOT AT ALL
6. BECOMING EASILY ANNOYED OR IRRITABLE: NOT AT ALL
1. FEELING NERVOUS, ANXIOUS, OR ON EDGE: NOT AT ALL
GAD7 TOTAL SCORE: 0
2. NOT BEING ABLE TO STOP OR CONTROL WORRYING: NOT AT ALL
5. BEING SO RESTLESS THAT IT IS HARD TO SIT STILL: NOT AT ALL

## 2024-06-19 NOTE — PROGRESS NOTES
Andrea Mireles Jr presents today for   Chief Complaint   Patient presents with    Follow-Up from Hospital     5/30/24: ER for frequent pvc's and bradycardia       Andrea Mireles Jr preferred language for health care discussion is english/other.    Is someone accompanying this pt? yes    Is the patient using any DME equipment during OV? no    Depression Screening:  Depression: Not at risk (6/19/2024)    PHQ-2     PHQ-2 Score: 0        Learning Assessment:  Who is the primary learner? Patient    What is the preferred language for health care of the primary learner? ENGLISH    How does the primary learner prefer to learn new concepts? DEMONSTRATION    Answered By patient    Relationship to Learner SELF           Pt currently taking Anticoagulant therapy? no    Pt currently taking Antiplatelet therapy ? no      Coordination of Care:  1. Have you been to the ER, urgent care clinic since your last visit? Hospitalized since your last visit? no    2. Have you seen or consulted any other health care providers outside of the [facility] since your last visit? Include any pap smears or colon screening. no

## 2024-06-19 NOTE — PROGRESS NOTES
06/19/24     Andrea Mireles Jr  is a 89 y.o. male     Chief Complaint   Patient presents with    Follow-Up from Hospital     5/30/24: ER for frequent pvc's and bradycardia       HPI    Patient presents for a new office visit.  He was referred here by the emergency room for evaluation of bradycardia and frequent PVCs.  He has a known history of a chronic right bundle branch block which was diagnosed well over 10 years ago.  He states that he was checking his heart rate and blood pressure at home especially when he was feeling dizzy.  He states the dizziness was more when he stood up at night.  He noted that his heart rate is as low as 30 bpm, so he decided to seek medical attention.  In the emergency room last month he was found to have frequent monomorphic PVCs and occasional PACs.    He has never experienced any heart palpitations, skipped heartbeats, syncope or near syncope.    He has been checking his heart rate at home since the ER visit and states he has had less low readings.  He denies any chest pain or shortness of breath.  No orthopnea, PND or significant leg swelling.  He may get a little swelling in his ankles at the end of the day but this always resolves overnight.    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

## 2024-07-01 ENCOUNTER — TELEPHONE (OUTPATIENT)
Age: 89
End: 2024-07-01

## 2024-07-01 RX ORDER — LANOLIN ALCOHOL/MO/W.PET/CERES
1000 CREAM (GRAM) TOPICAL DAILY
COMMUNITY

## 2024-07-01 RX ORDER — LIDOCAINE 50 MG/G
1 PATCH TOPICAL PRN
COMMUNITY

## 2024-07-01 RX ORDER — FLUOROURACIL 5 MG/G
1 CREAM TOPICAL PRN
COMMUNITY

## 2024-07-01 RX ORDER — AMMONIUM LACTATE 12 G/100G
1 CREAM TOPICAL PRN
COMMUNITY

## 2024-07-18 ENCOUNTER — TELEPHONE (OUTPATIENT)
Age: 89
End: 2024-07-18

## 2024-07-18 NOTE — TELEPHONE ENCOUNTER
Verbal order and read back per Matthias Feng MD  Please let the patient know that he had frequent PVCs on his Holter monitor.  As long as he is asymptomatic, no need for additional medications.     This has been fully explained to the patient, who indicates understanding.

## 2024-07-18 NOTE — TELEPHONE ENCOUNTER
----- Message from Matthais Feng MD sent at 7/11/2024  2:44 PM EDT -----  Please let the patient know that his heart function was normal on echocardiogram.  ----- Message -----  From: Zoey Hernandez MA  Sent: 7/11/2024   9:33 AM EDT  To: Matthias Feng MD    Per your  last note \"  Frequent PVCs.  These appear to be monomorphic in nature based on previous EKGs.  I suspect he was having ventricular bigeminy causing the heart rate to register slow on his blood pressure cuff and pulse oximeter.  I have recommended he wear a 48-hour Holter monitor to assess his PVC burden.  I will also like to arrange for an echocardiogram to evaluate for any structural heart disease.     Bradycardia.  This will be evaluated with the Holter monitor.  He states this has improved when he checks his heart rate at home.     Chronic right bundle branch block.  This has been documented on prior EKGs dating back for well over 10 years according to the patient.  An echocardiogram has been ordered to evaluate for structural heart disease.

## 2024-07-18 NOTE — TELEPHONE ENCOUNTER
----- Message from Matthias Feng MD sent at 7/16/2024  5:06 PM EDT -----  Please let the patient know that he had frequent PVCs on his Holter monitor.  As long as he is asymptomatic, no need for additional medications.  ----- Message -----  From: Zoey Hernandez MA  Sent: 7/16/2024   9:03 AM EDT  To: Matthias Feng MD    Per your last note \"  Frequent PVCs.  These appear to be monomorphic in nature based on previous EKGs.  I suspect he was having ventricular bigeminy causing the heart rate to register slow on his blood pressure cuff and pulse oximeter.  I have recommended he wear a 48-hour Holter monitor to assess his PVC burden.  I will also like to arrange for an echocardiogram to evaluate for any structural heart disease.

## 2024-09-16 ENCOUNTER — OFFICE VISIT (OUTPATIENT)
Age: 89
End: 2024-09-16
Payer: MEDICARE

## 2024-09-16 VITALS
OXYGEN SATURATION: 96 % | DIASTOLIC BLOOD PRESSURE: 68 MMHG | SYSTOLIC BLOOD PRESSURE: 116 MMHG | HEIGHT: 70 IN | WEIGHT: 181 LBS | BODY MASS INDEX: 25.91 KG/M2 | HEART RATE: 64 BPM

## 2024-09-16 DIAGNOSIS — R94.31 ABNORMAL EKG: ICD-10-CM

## 2024-09-16 DIAGNOSIS — I49.3 PVC (PREMATURE VENTRICULAR CONTRACTION): Primary | ICD-10-CM

## 2024-09-16 DIAGNOSIS — I45.10 RIGHT BUNDLE BRANCH BLOCK: ICD-10-CM

## 2024-09-16 PROCEDURE — 1123F ACP DISCUSS/DSCN MKR DOCD: CPT | Performed by: INTERNAL MEDICINE

## 2024-09-16 PROCEDURE — 99214 OFFICE O/P EST MOD 30 MIN: CPT | Performed by: INTERNAL MEDICINE

## 2024-09-16 PROCEDURE — 93000 ELECTROCARDIOGRAM COMPLETE: CPT | Performed by: INTERNAL MEDICINE

## 2024-09-16 ASSESSMENT — ENCOUNTER SYMPTOMS
ABDOMINAL PAIN: 0
SHORTNESS OF BREATH: 0
COUGH: 0
NAUSEA: 0
VOMITING: 0
ABDOMINAL DISTENTION: 0
SORE THROAT: 0

## 2024-09-16 ASSESSMENT — ANXIETY QUESTIONNAIRES
GAD7 TOTAL SCORE: 0
5. BEING SO RESTLESS THAT IT IS HARD TO SIT STILL: NOT AT ALL
7. FEELING AFRAID AS IF SOMETHING AWFUL MIGHT HAPPEN: NOT AT ALL
3. WORRYING TOO MUCH ABOUT DIFFERENT THINGS: NOT AT ALL
1. FEELING NERVOUS, ANXIOUS, OR ON EDGE: NOT AT ALL
2. NOT BEING ABLE TO STOP OR CONTROL WORRYING: NOT AT ALL
4. TROUBLE RELAXING: NOT AT ALL
6. BECOMING EASILY ANNOYED OR IRRITABLE: NOT AT ALL

## 2024-09-16 ASSESSMENT — PATIENT HEALTH QUESTIONNAIRE - PHQ9
SUM OF ALL RESPONSES TO PHQ QUESTIONS 1-9: 0
1. LITTLE INTEREST OR PLEASURE IN DOING THINGS: NOT AT ALL
SUM OF ALL RESPONSES TO PHQ QUESTIONS 1-9: 0
2. FEELING DOWN, DEPRESSED OR HOPELESS: NOT AT ALL
SUM OF ALL RESPONSES TO PHQ9 QUESTIONS 1 & 2: 0

## 2024-10-02 ENCOUNTER — HOSPITAL ENCOUNTER (OUTPATIENT)
Facility: HOSPITAL | Age: 89
Setting detail: SPECIMEN
Discharge: HOME | End: 2024-10-05
Payer: MEDICARE

## 2024-10-02 DIAGNOSIS — I83.893 VARICOSE VEINS OF BOTH LEGS WITH EDEMA: ICD-10-CM

## 2024-10-02 LAB
ALBUMIN SERPL-MCNC: 3.6 G/DL (ref 3.4–5)
ALBUMIN/GLOB SERPL: 1.3 (ref 0.8–1.7)
ALP SERPL-CCNC: 57 U/L (ref 45–117)
ALT SERPL-CCNC: 16 U/L (ref 16–61)
ANION GAP SERPL CALC-SCNC: 6 MMOL/L (ref 3–18)
AST SERPL-CCNC: 18 U/L (ref 10–38)
BASOPHILS # BLD: 0 K/UL (ref 0–0.1)
BASOPHILS NFR BLD: 1 % (ref 0–2)
BILIRUB SERPL-MCNC: 0.5 MG/DL (ref 0.2–1)
BUN SERPL-MCNC: 19 MG/DL (ref 7–18)
BUN/CREAT SERPL: 17 (ref 12–20)
CALCIUM SERPL-MCNC: 8.9 MG/DL (ref 8.5–10.1)
CHLORIDE SERPL-SCNC: 110 MMOL/L (ref 100–111)
CHOLEST SERPL-MCNC: 136 MG/DL
CO2 SERPL-SCNC: 25 MMOL/L (ref 21–32)
CREAT SERPL-MCNC: 1.1 MG/DL (ref 0.6–1.3)
DIFFERENTIAL METHOD BLD: ABNORMAL
EOSINOPHIL # BLD: 0.1 K/UL (ref 0–0.4)
EOSINOPHIL NFR BLD: 2 % (ref 0–5)
ERYTHROCYTE [DISTWIDTH] IN BLOOD BY AUTOMATED COUNT: 14 % (ref 11.6–14.5)
GLOBULIN SER CALC-MCNC: 2.7 G/DL (ref 2–4)
GLUCOSE SERPL-MCNC: 126 MG/DL (ref 74–99)
HCT VFR BLD AUTO: 40.9 % (ref 36–48)
HDLC SERPL-MCNC: 74 MG/DL (ref 40–60)
HDLC SERPL: 1.8 (ref 0–5)
HGB BLD-MCNC: 13.1 G/DL (ref 13–16)
IMM GRANULOCYTES # BLD AUTO: 0 K/UL (ref 0–0.04)
IMM GRANULOCYTES NFR BLD AUTO: 0 % (ref 0–0.5)
LDLC SERPL CALC-MCNC: 49.2 MG/DL (ref 0–100)
LIPID PANEL: ABNORMAL
LYMPHOCYTES # BLD: 2.6 K/UL (ref 0.9–3.6)
LYMPHOCYTES NFR BLD: 46 % (ref 21–52)
MCH RBC QN AUTO: 31.5 PG (ref 24–34)
MCHC RBC AUTO-ENTMCNC: 32 G/DL (ref 31–37)
MCV RBC AUTO: 98.3 FL (ref 78–100)
MONOCYTES # BLD: 0.5 K/UL (ref 0.05–1.2)
MONOCYTES NFR BLD: 10 % (ref 3–10)
NEUTS SEG # BLD: 2.3 K/UL (ref 1.8–8)
NEUTS SEG NFR BLD: 41 % (ref 40–73)
NRBC # BLD: 0 K/UL (ref 0–0.01)
NRBC BLD-RTO: 0 PER 100 WBC
PLATELET # BLD AUTO: 176 K/UL (ref 135–420)
PMV BLD AUTO: 10.8 FL (ref 9.2–11.8)
POTASSIUM SERPL-SCNC: 4.1 MMOL/L (ref 3.5–5.5)
PROT SERPL-MCNC: 6.3 G/DL (ref 6.4–8.2)
RBC # BLD AUTO: 4.16 M/UL (ref 4.35–5.65)
SODIUM SERPL-SCNC: 141 MMOL/L (ref 136–145)
TRIGL SERPL-MCNC: 64 MG/DL
VLDLC SERPL CALC-MCNC: 12.8 MG/DL
WBC # BLD AUTO: 5.6 K/UL (ref 4.6–13.2)

## 2024-10-02 PROCEDURE — 80053 COMPREHEN METABOLIC PANEL: CPT

## 2024-10-02 PROCEDURE — 36415 COLL VENOUS BLD VENIPUNCTURE: CPT

## 2024-10-02 PROCEDURE — 85025 COMPLETE CBC W/AUTO DIFF WBC: CPT

## 2024-10-02 PROCEDURE — 80061 LIPID PANEL: CPT

## 2024-10-10 ENCOUNTER — OFFICE VISIT (OUTPATIENT)
Facility: CLINIC | Age: 89
End: 2024-10-10

## 2024-10-10 VITALS
HEIGHT: 70 IN | TEMPERATURE: 98.4 F | SYSTOLIC BLOOD PRESSURE: 110 MMHG | RESPIRATION RATE: 16 BRPM | WEIGHT: 190 LBS | HEART RATE: 43 BPM | OXYGEN SATURATION: 97 % | DIASTOLIC BLOOD PRESSURE: 68 MMHG | BODY MASS INDEX: 27.2 KG/M2

## 2024-10-10 DIAGNOSIS — I83.893 VARICOSE VEINS OF BOTH LEGS WITH EDEMA: ICD-10-CM

## 2024-10-10 DIAGNOSIS — Z71.89 ADVANCED CARE PLANNING/COUNSELING DISCUSSION: ICD-10-CM

## 2024-10-10 DIAGNOSIS — I49.3 PVC (PREMATURE VENTRICULAR CONTRACTION): ICD-10-CM

## 2024-10-10 DIAGNOSIS — R73.09 ELEVATED GLUCOSE LEVEL: ICD-10-CM

## 2024-10-10 DIAGNOSIS — M81.0 OSTEOPOROSIS, UNSPECIFIED OSTEOPOROSIS TYPE, UNSPECIFIED PATHOLOGICAL FRACTURE PRESENCE: ICD-10-CM

## 2024-10-10 DIAGNOSIS — Z23 NEED FOR IMMUNIZATION AGAINST INFLUENZA: ICD-10-CM

## 2024-10-10 DIAGNOSIS — M54.42 LOW BACK PAIN WITH LEFT-SIDED SCIATICA, UNSPECIFIED BACK PAIN LATERALITY, UNSPECIFIED CHRONICITY: ICD-10-CM

## 2024-10-10 DIAGNOSIS — Z00.00 MEDICARE ANNUAL WELLNESS VISIT, SUBSEQUENT: Primary | ICD-10-CM

## 2024-10-10 DIAGNOSIS — K21.9 GASTROESOPHAGEAL REFLUX DISEASE WITHOUT ESOPHAGITIS: ICD-10-CM

## 2024-10-10 PROBLEM — R07.81 RIB PAIN: Status: RESOLVED | Noted: 2024-05-08 | Resolved: 2024-10-10

## 2024-10-10 LAB — HBA1C MFR BLD: 5.3 %

## 2024-10-10 ASSESSMENT — PATIENT HEALTH QUESTIONNAIRE - PHQ9
2. FEELING DOWN, DEPRESSED OR HOPELESS: NOT AT ALL
1. LITTLE INTEREST OR PLEASURE IN DOING THINGS: NOT AT ALL
SUM OF ALL RESPONSES TO PHQ QUESTIONS 1-9: 0
SUM OF ALL RESPONSES TO PHQ9 QUESTIONS 1 & 2: 0
SUM OF ALL RESPONSES TO PHQ QUESTIONS 1-9: 0

## 2024-10-10 ASSESSMENT — LIFESTYLE VARIABLES
HOW OFTEN DO YOU HAVE A DRINK CONTAINING ALCOHOL: NEVER
HOW MANY STANDARD DRINKS CONTAINING ALCOHOL DO YOU HAVE ON A TYPICAL DAY: PATIENT DOES NOT DRINK

## 2024-10-10 NOTE — PROGRESS NOTES
Medicare Annual Wellness Visit    Andrea Mireles Jr is here for Medicare AWV    Assessment & Plan   Need for immunization against influenza  -     Influenza, FLUAD Trivalent, (age 65 y+), IM, Preservative Free, 0.5mL  Elevated glucose level  -     AMB POC HEMOGLOBIN A1C  -     CBC with Auto Differential; Future  -     Comprehensive Metabolic Panel; Future  -     Lipid Panel; Future  -     HEMOGLOBIN A1C W/O EAG; Future  Advanced care planning/counseling discussion  PVC (premature ventricular contraction)  Varicose veins of both legs with edema  Gastroesophageal reflux disease without esophagitis  Low back pain with left-sided sciatica, unspecified back pain laterality, unspecified chronicity  Osteoporosis, unspecified osteoporosis type, unspecified pathological fracture presence  Medicare annual wellness visit, subsequent    Recommendations for Preventive Services Due: see orders and patient instructions/AVS.  Recommended screening schedule for the next 5-10 years is provided to the patient in written form: see Patient Instructions/AVS.     Return in 1 year (on 10/10/2025) for Medicare Annual Wellness Visit in 1 year.     Subjective       Patient's complete Health Risk Assessment and screening values have been reviewed and are found in Flowsheets. The following problems were reviewed today and where indicated follow up appointments were made and/or referrals ordered.    Positive Risk Factor Screenings with Interventions:    Fall Risk:  Do you feel unsteady or are you worried about falling? : (!) yes  2 or more falls in past year?: no  Fall with injury in past year?: no     Interventions:    Reviewed medications, home hazards, visual acuity, and co-morbidities that can increase risk for falls  Patient declines any further evaluation or treatment             Inactivity:  On average, how many days per week do you engage in moderate to strenuous exercise (like a brisk walk)?: 0 days (!) Abnormal  On average, how many 
Andrea Mireles Jr presents today for   Chief Complaint   Patient presents with    Medicare AWV     Accompanied by daughter.    \"Have you been to the ER, urgent care clinic since your last visit?  Hospitalized since your last visit?\"    NO    “Have you seen or consulted any other health care providers outside of [facility] since your last visit?”    NO           
Verbal order read back per Dr. Zee.  Patient received Influenza High Dose vaccine in LEFT deltoid.  Patient tolerated well and left without complaints.  Patient received VIS.     
Observation  5.  Spine.  Per Dr Scales  6.  Probable neuropathy.  On armando  7.  Vascular.  Follow up Dr Mendez as needed, continue stockings  8.  HD flu given, RSV and updated covid advocated  9.  Cardiology.  Follow up Dr Feng as scheduled      RTC 10/25    Above conditions discussed at length and patient vocalized understanding.  All questions answered to patient satisfaction     Diagnosis Orders   1. Need for immunization against influenza  Influenza, FLUAD Trivalent, (age 65 y+), IM, Preservative Free, 0.5mL      2. Elevated glucose level  AMB POC HEMOGLOBIN A1C    CBC with Auto Differential    Comprehensive Metabolic Panel    Lipid Panel    HEMOGLOBIN A1C W/O EAG      3. Advanced care planning/counseling discussion        4. PVC (premature ventricular contraction)        5. Varicose veins of both legs with edema        6. Gastroesophageal reflux disease without esophagitis        7. Low back pain with left-sided sciatica, unspecified back pain laterality, unspecified chronicity        8. Osteoporosis, unspecified osteoporosis type, unspecified pathological fracture presence

## 2024-10-10 NOTE — ACP (ADVANCE CARE PLANNING)
Advance Care Planning     Advance Care Planning (ACP) Physician/NP/PA Conversation    Date of Conversation: 10/10/2024  Conducted with: Patient with Decision Making Capacity    Healthcare Decision Maker:      Primary Decision Maker: Gricel Freedman - [phone]    Click here to complete Healthcare Decision Makers including selection of the Healthcare Decision Maker Relationship (ie \"Primary\")  Today we documented Decision Maker(s) consistent with Legal Next of Kin hierarchy.    Care Preferences:    Hospitalization:  \"If your health worsens and it becomes clear that your chance of recovery is unlikely, what would be your preference regarding hospitalization?\"  The patient would prefer hospitalization.    Ventilation:  \"If you were unable to breath on your own and your chance of recovery was unlikely, what would be your preference about the use of a ventilator (breathing machine) if it was available to you?\"  The patient would desire the use of a ventilator.    Resuscitation:  \"In the event your heart stopped as a result of an underlying serious health condition, would you want attempts made to restart your heart, or would you prefer a natural death?\"  Yes, attempt to resuscitate.    ventilation preferences, hospitalization preferences, and resuscitation preferences    Conversation Outcomes / Follow-Up Plan:  ACP in process - information provided, considering goals and options  Reviewed DNR/DNI and patient elects Full Code (Attempt Resuscitation)    Length of Voluntary ACP Conversation in minutes:  16 minutes    MARISOL BARR MD

## 2024-10-11 ENCOUNTER — TELEPHONE (OUTPATIENT)
Age: 89
End: 2024-10-11

## 2024-10-11 ENCOUNTER — TELEPHONE (OUTPATIENT)
Facility: CLINIC | Age: 89
End: 2024-10-11

## 2024-10-11 DIAGNOSIS — M50.30 DDD (DEGENERATIVE DISC DISEASE), CERVICAL: ICD-10-CM

## 2024-10-11 DIAGNOSIS — M43.12 SPONDYLOLISTHESIS, CERVICAL REGION: ICD-10-CM

## 2024-10-11 DIAGNOSIS — M47.812 CERVICAL SPONDYLOSIS WITHOUT MYELOPATHY: ICD-10-CM

## 2024-10-11 DIAGNOSIS — M47.816 FACET ARTHROPATHY, LUMBAR: ICD-10-CM

## 2024-10-11 DIAGNOSIS — M54.6 PAIN IN THORACIC SPINE: ICD-10-CM

## 2024-10-11 DIAGNOSIS — M51.369 DDD (DEGENERATIVE DISC DISEASE), LUMBAR: ICD-10-CM

## 2024-10-11 RX ORDER — GABAPENTIN 300 MG/1
300 CAPSULE ORAL 3 TIMES DAILY
Qty: 270 CAPSULE | Refills: 1 | Status: SHIPPED | OUTPATIENT
Start: 2024-10-11 | End: 2025-04-09

## 2024-10-11 NOTE — TELEPHONE ENCOUNTER
Refill   Gabapentin   [facility] [address]       Pt asking for Dr Zee to take over prescibing medication

## 2024-10-11 NOTE — TELEPHONE ENCOUNTER
Pt requesting a med refill on RX:    gabapentin (NEURONTIN) 300 MG capsule     Pharmacy:  [facility] - [place] - [address] - P [phone] - F [phone]    Callback # [phone]

## 2024-10-16 PROBLEM — Z79.899 ENCOUNTER FOR MONITORING IMMUNOMODULATING THERAPY: Status: ACTIVE | Noted: 2024-10-16

## 2024-10-16 PROBLEM — Z51.81 ENCOUNTER FOR MONITORING IMMUNOMODULATING THERAPY: Status: ACTIVE | Noted: 2024-10-16

## 2024-10-16 PROBLEM — Z79.60 ENCOUNTER FOR MONITORING IMMUNOMODULATING THERAPY: Status: ACTIVE | Noted: 2024-10-16

## 2024-10-22 ENCOUNTER — TELEPHONE (OUTPATIENT)
Facility: CLINIC | Age: 89
End: 2024-10-22

## 2024-10-25 ENCOUNTER — HOSPITAL ENCOUNTER (OUTPATIENT)
Facility: HOSPITAL | Age: 89
Setting detail: INFUSION SERIES
Discharge: HOME | End: 2024-10-28
Payer: MEDICARE

## 2024-10-25 VITALS
SYSTOLIC BLOOD PRESSURE: 145 MMHG | HEART RATE: 67 BPM | OXYGEN SATURATION: 97 % | DIASTOLIC BLOOD PRESSURE: 75 MMHG | TEMPERATURE: 98.2 F | RESPIRATION RATE: 16 BRPM

## 2024-10-25 DIAGNOSIS — M81.0 OSTEOPOROSIS, UNSPECIFIED OSTEOPOROSIS TYPE, UNSPECIFIED PATHOLOGICAL FRACTURE PRESENCE: ICD-10-CM

## 2024-10-25 DIAGNOSIS — Z51.81 ENCOUNTER FOR MONITORING IMMUNOMODULATING THERAPY: Primary | ICD-10-CM

## 2024-10-25 DIAGNOSIS — Z79.899 ENCOUNTER FOR MONITORING IMMUNOMODULATING THERAPY: Primary | ICD-10-CM

## 2024-10-25 LAB
MAGNESIUM SERPL-MCNC: 2.2 MG/DL (ref 1.6–2.6)
PHOSPHATE SERPL-MCNC: 3.5 MG/DL (ref 2.5–4.9)

## 2024-10-25 PROCEDURE — 96372 THER/PROPH/DIAG INJ SC/IM: CPT

## 2024-10-25 PROCEDURE — 36415 COLL VENOUS BLD VENIPUNCTURE: CPT

## 2024-10-25 PROCEDURE — 84100 ASSAY OF PHOSPHORUS: CPT

## 2024-10-25 PROCEDURE — 83735 ASSAY OF MAGNESIUM: CPT

## 2024-10-25 PROCEDURE — 6360000002 HC RX W HCPCS: Performed by: NURSE PRACTITIONER

## 2024-10-25 RX ORDER — DIPHENHYDRAMINE HYDROCHLORIDE 50 MG/ML
50 INJECTION INTRAMUSCULAR; INTRAVENOUS
OUTPATIENT
Start: 2025-04-20

## 2024-10-25 RX ORDER — ACETAMINOPHEN 325 MG/1
650 TABLET ORAL
OUTPATIENT
Start: 2025-04-20

## 2024-10-25 RX ORDER — ONDANSETRON 2 MG/ML
8 INJECTION INTRAMUSCULAR; INTRAVENOUS
OUTPATIENT
Start: 2025-04-20

## 2024-10-25 RX ORDER — EPINEPHRINE 1 MG/ML
0.3 INJECTION, SOLUTION, CONCENTRATE INTRAVENOUS PRN
OUTPATIENT
Start: 2025-04-20

## 2024-10-25 RX ORDER — ALBUTEROL SULFATE 90 UG/1
4 INHALANT RESPIRATORY (INHALATION) PRN
OUTPATIENT
Start: 2025-04-20

## 2024-10-25 RX ORDER — SODIUM CHLORIDE 9 MG/ML
INJECTION, SOLUTION INTRAVENOUS CONTINUOUS
OUTPATIENT
Start: 2025-04-20

## 2024-10-25 RX ADMIN — DENOSUMAB 60 MG: 60 INJECTION SUBCUTANEOUS at 13:40

## 2024-10-25 NOTE — PROGRESS NOTES
[facility] Progress Note    Date: 2024    Name: Andrea Mireles Jr    MRN: [id number]         : 1930      PROLIA Q6 MONTHS      Mr. Aline Lim arrived to [facility] at 1315  in stable condition. Pt is ambulatory without assistance and is accompanied by his adult daughter.    Mr. Aline Lim was assessed and education was provided.     Mr. Aline Lim's vitals were reviewed.  Vitals:    10/25/24 1315   BP: (!) 145/75   Pulse: 67   Resp: 16   Temp: 98.2 °F (36.8 °C)   TempSrc: Oral   SpO2: 97%       Labs (Magnesium & Renal Function Panel) that were drawn on 10/02/24 were reviewed:   Latest Reference Range & Units 10/02/24 08:20   Sodium 136 - 145 mmol/L 141   Potassium 3.5 - 5.5 mmol/L 4.1   Chloride 100 - 111 mmol/L 110   CARBON DIOXIDE 21 - 32 mmol/L 25   BUN,BUNPL 7.0 - 18 MG/DL 19 (H)   Creatinine 0.6 - 1.3 MG/DL 1.10   Bun/Cre 12 - 20   17   Anion Gap 3.0 - 18 mmol/L 6   Est, Glom Filt Rate >60 ml/min/1.73m2 62   Glucose 74 - 99 mg/dL 126 (H)   Calcium 8.5 - 10.1 MG/DL 8.9   (H): Data is abnormally high    No Magnesium or Phosphorus was obtained, so they were drawn @ 1340 from RIGHT AC VEIN and transported to the main hospital lab via .    Results of these labs were reviewed:   Latest Reference Range & Units 10/25/24 13:30   Magnesium 1.6 - 2.6 mg/dL 2.2   Phosphorus 2.5 - 4.9 MG/DL 3.5     All labs are adequate for treatment today.    Prolia 60mg was administered as ordered SQ in patient's LEFT upper arm. Band-aid applied to site.     Mr. Aline Lim tolerated the injection well without complaints.    Discharge/ follow-up instructions discussed with patient. Patient verbalized understanding. His armband removed & shredded.    [facility]. Aline [facility] ambulatory and in stable condition at 1345. He is to return on Friday, 25, at 1300,  for his next Prolia injection. (Labs to be done 4/10/25 in PCP's office.)    Mulu Fernandes RN  2024

## 2024-12-23 ENCOUNTER — APPOINTMENT (OUTPATIENT)
Facility: HOSPITAL | Age: 88
End: 2024-12-23
Payer: MEDICARE

## 2024-12-23 ENCOUNTER — HOSPITAL ENCOUNTER (EMERGENCY)
Facility: HOSPITAL | Age: 88
Discharge: HOME | End: 2024-12-23
Attending: EMERGENCY MEDICINE
Payer: MEDICARE

## 2024-12-23 VITALS
SYSTOLIC BLOOD PRESSURE: 123 MMHG | WEIGHT: 170 LBS | BODY MASS INDEX: 23.8 KG/M2 | TEMPERATURE: 97.4 F | RESPIRATION RATE: 18 BRPM | OXYGEN SATURATION: 97 % | DIASTOLIC BLOOD PRESSURE: 55 MMHG | HEART RATE: 78 BPM | HEIGHT: 71 IN

## 2024-12-23 DIAGNOSIS — B34.8 RHINOVIRUS: ICD-10-CM

## 2024-12-23 DIAGNOSIS — J06.9 ACUTE UPPER RESPIRATORY INFECTION: Primary | ICD-10-CM

## 2024-12-23 LAB
B PERT DNA SPEC QL NAA+PROBE: NOT DETECTED
BORDETELLA PARAPERTUSSIS BY PCR: NOT DETECTED
C PNEUM DNA SPEC QL NAA+PROBE: NOT DETECTED
FLUAV RNA SPEC QL NAA+PROBE: NOT DETECTED
FLUAV SUBTYP SPEC NAA+PROBE: NOT DETECTED
FLUBV RNA SPEC QL NAA+PROBE: NOT DETECTED
FLUBV RNA SPEC QL NAA+PROBE: NOT DETECTED
HADV DNA SPEC QL NAA+PROBE: NOT DETECTED
HCOV 229E RNA SPEC QL NAA+PROBE: NOT DETECTED
HCOV HKU1 RNA SPEC QL NAA+PROBE: NOT DETECTED
HCOV NL63 RNA SPEC QL NAA+PROBE: NOT DETECTED
HCOV OC43 RNA SPEC QL NAA+PROBE: NOT DETECTED
HMPV RNA SPEC QL NAA+PROBE: NOT DETECTED
HPIV1 RNA SPEC QL NAA+PROBE: NOT DETECTED
HPIV2 RNA SPEC QL NAA+PROBE: NOT DETECTED
HPIV3 RNA SPEC QL NAA+PROBE: NOT DETECTED
HPIV4 RNA SPEC QL NAA+PROBE: NOT DETECTED
M PNEUMO DNA SPEC QL NAA+PROBE: NOT DETECTED
RSV RNA SPEC QL NAA+PROBE: NOT DETECTED
RV+EV RNA SPEC QL NAA+PROBE: DETECTED
SARS-COV-2 RNA RESP QL NAA+PROBE: NOT DETECTED
SARS-COV-2 RNA RESP QL NAA+PROBE: NOT DETECTED
SOURCE: NORMAL

## 2024-12-23 PROCEDURE — 71046 X-RAY EXAM CHEST 2 VIEWS: CPT

## 2024-12-23 PROCEDURE — 0202U NFCT DS 22 TRGT SARS-COV-2: CPT

## 2024-12-23 PROCEDURE — 87636 SARSCOV2 & INF A&B AMP PRB: CPT

## 2024-12-23 PROCEDURE — 99284 EMERGENCY DEPT VISIT MOD MDM: CPT

## 2024-12-23 RX ORDER — BENZONATATE 200 MG/1
200 CAPSULE ORAL 3 TIMES DAILY PRN
Qty: 30 CAPSULE | Refills: 0 | Status: SHIPPED | OUTPATIENT
Start: 2024-12-23 | End: 2025-01-02

## 2024-12-23 ASSESSMENT — PAIN SCALES - GENERAL: PAINLEVEL_OUTOF10: 0

## 2024-12-23 ASSESSMENT — PAIN - FUNCTIONAL ASSESSMENT: PAIN_FUNCTIONAL_ASSESSMENT: 0-10

## 2024-12-23 NOTE — ED PROVIDER NOTES
[facility] EMERGENCY DEPT  EMERGENCY DEPARTMENT ENCOUNTER    Patient Name: Andrea Mireles Jr  MRN: [id number]  YOB: 1930  Provider: Mack Cardoza DO  PCP: Jw Zee MD   Time/Date of evaluation: 12:19 PM EST on 12/23/24    History of Presenting Illness     History Provided by: Patient  History is limited by: Nothing     HISTORY:   Andrea Mireles Jr is a 89 y.o. male presents to the ED with a chief complaint of subjective fever, chills, congestion x 1 week  He was escorted to the ED by his son.  He denies any chest pain, shortness of breath, nausea, vomiting, diarrhea, back pain, flank pain, rash, ear pain, dysuria, hematuria, abdominal pain, melena, or bright red rectal bleeding.  Nursing Notes were all reviewed and agreed with or any disagreements were addressed in the HPI.    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    xray showed mod/sev discogenic degen changes C5-T1, asymmetric L facet hypertrophy C3-5    Degenerative arthritis of lumbar spine     MRI 11/14 w multilevel disease; chronic lbp w sciatica saw Dr Ronquillo    Diverticulosis     on CT 4/16    ED (erectile dysfunction)     H/O echocardiogram 07/2024    Dr Feng; ef 50-55%, mild FABIOLA, no wma, mod TR, pap 44 (7/24)    Nephrolithiasis 04/2016    right tiny stone on CT; microhematuria    Neuropathy 10/2022    Osteoporosis     ibandronate 2009-5/13 stopped due to tooth issues per his endodontist;  DEXA t score -2.4 spibe, -2.1 hip (8/11); -2.3 spine, -1.9 hip (8/13); -2.4 spine, -2.4 hip (9/15); -0.5 wrist, -2.2 hip (3/22); prolia 4/22- NP Flaco    Proctalgia fugax     PVC (premature ventricular contraction) 07/2024    holter showed 20% pvcs    RBBB (right bundle branch block) 2010    and bradycardia; saw Dr Feng (5/24)    Right inguinal

## 2024-12-23 NOTE — ED NOTES
Per Dr. Cardoza patient contacted and made aware of the final Diagnosis of Rhinovirus and to continue plan of treatment as previously ordered.

## 2024-12-26 ENCOUNTER — OFFICE VISIT (OUTPATIENT)
Facility: CLINIC | Age: 88
End: 2024-12-26
Payer: MEDICARE

## 2024-12-26 VITALS
BODY MASS INDEX: 25.06 KG/M2 | OXYGEN SATURATION: 97 % | WEIGHT: 179 LBS | DIASTOLIC BLOOD PRESSURE: 66 MMHG | HEIGHT: 71 IN | TEMPERATURE: 97.7 F | RESPIRATION RATE: 16 BRPM | SYSTOLIC BLOOD PRESSURE: 126 MMHG | HEART RATE: 66 BPM

## 2024-12-26 DIAGNOSIS — B34.8 RHINOVIRUS INFECTION: ICD-10-CM

## 2024-12-26 DIAGNOSIS — J01.10 ACUTE NON-RECURRENT FRONTAL SINUSITIS: Primary | ICD-10-CM

## 2024-12-26 PROCEDURE — G8482 FLU IMMUNIZE ORDER/ADMIN: HCPCS | Performed by: INTERNAL MEDICINE

## 2024-12-26 PROCEDURE — G8420 CALC BMI NORM PARAMETERS: HCPCS | Performed by: INTERNAL MEDICINE

## 2024-12-26 PROCEDURE — 1123F ACP DISCUSS/DSCN MKR DOCD: CPT | Performed by: INTERNAL MEDICINE

## 2024-12-26 PROCEDURE — 1126F AMNT PAIN NOTED NONE PRSNT: CPT | Performed by: INTERNAL MEDICINE

## 2024-12-26 PROCEDURE — 1159F MED LIST DOCD IN RCRD: CPT | Performed by: INTERNAL MEDICINE

## 2024-12-26 PROCEDURE — G8427 DOCREV CUR MEDS BY ELIG CLIN: HCPCS | Performed by: INTERNAL MEDICINE

## 2024-12-26 PROCEDURE — 1036F TOBACCO NON-USER: CPT | Performed by: INTERNAL MEDICINE

## 2024-12-26 PROCEDURE — 99213 OFFICE O/P EST LOW 20 MIN: CPT | Performed by: INTERNAL MEDICINE

## 2024-12-26 RX ORDER — CEFUROXIME AXETIL 250 MG/1
250 TABLET ORAL 2 TIMES DAILY
Qty: 20 TABLET | Refills: 0 | Status: SHIPPED | OUTPATIENT
Start: 2024-12-26 | End: 2025-01-05

## 2024-12-26 NOTE — PROGRESS NOTES
89 y.o. male who presents for evaluation.    He is here to follow-up after his ER visit.  He had been sick for about a week with upper respiratory complaints, went to the ER 12/23/2024.  Chest x-ray negative, COVID and flu negative but respiratory panel positive for rhinovirus.  He was given benzonatate.  He thinks it's more of a sinus infection now, reports green drainage, mostly right-sided facial and frontal pressure.  No fevers, he has dry cough, denied ear pain, sore throat, swallowing difficulty.  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    xray showed mod/sev discogenic degen changes C5-T1, asymmetric L facet hypertrophy C3-5    Degenerative arthritis of lumbar spine     MRI 11/14 w multilevel disease; chronic lbp w sciatica saw Dr Ronquillo    Diverticulosis     on CT 4/16    ED (erectile dysfunction)     H/O echocardiogram 07/2024    Dr Feng; ef 50-55%, mild FABIOLA, no wma, mod TR, pap 44 (7/24)    Nephrolithiasis 04/2016    right tiny stone on CT; microhematuria    Neuropathy 10/2022    Osteoporosis     ibandronate 2009-5/13 stopped due to tooth issues per his endodontist;  DEXA t score -2.4 spibe, -2.1 hip (8/11); -2.3 spine, -1.9 hip (8/13); -2.4 spine, -2.4 hip (9/15); -0.5 wrist, -2.2 hip (3/22); prolia 4/22- NP Bryan    Proctalgia fugax     PVC (premature ventricular contraction) 07/2024    holter showed 20% pvcs    RBBB (right bundle branch block) 2010    and bradycardia; saw Dr Feng (5/24)    Right inguinal hernia     Dr Trevizo    Tinnitus     Varicose veins of both legs with edema 09/15/2022    Dr Mendez     Current Outpatient Medications   Medication Sig    cefUROXime (CEFTIN) 250 MG tablet Take 1 tablet by mouth 2 times daily for 10 days    benzonatate (TESSALON) 200 MG capsule Take 1

## 2025-01-03 ENCOUNTER — HOSPITAL ENCOUNTER (EMERGENCY)
Facility: HOSPITAL | Age: 89
Discharge: HOME | End: 2025-01-03
Payer: MEDICARE

## 2025-01-03 VITALS
SYSTOLIC BLOOD PRESSURE: 126 MMHG | TEMPERATURE: 97.4 F | HEIGHT: 71 IN | HEART RATE: 76 BPM | DIASTOLIC BLOOD PRESSURE: 71 MMHG | WEIGHT: 170 LBS | RESPIRATION RATE: 20 BRPM | BODY MASS INDEX: 23.8 KG/M2 | OXYGEN SATURATION: 100 %

## 2025-01-03 DIAGNOSIS — R05.1 ACUTE COUGH: ICD-10-CM

## 2025-01-03 DIAGNOSIS — M54.50 ACUTE RIGHT-SIDED LOW BACK PAIN WITHOUT SCIATICA: Primary | ICD-10-CM

## 2025-01-03 LAB
APPEARANCE UR: CLEAR
BILIRUB UR QL: NEGATIVE
COLOR UR: YELLOW
GLUCOSE UR STRIP.AUTO-MCNC: NEGATIVE MG/DL
HGB UR QL STRIP: NEGATIVE
KETONES UR QL STRIP.AUTO: NEGATIVE MG/DL
LEUKOCYTE ESTERASE UR QL STRIP.AUTO: NEGATIVE
NITRITE UR QL STRIP.AUTO: NEGATIVE
PH UR STRIP: 7 (ref 5–8)
PROT UR STRIP-MCNC: NEGATIVE MG/DL
SP GR UR REFRACTOMETRY: 1.01 (ref 1–1.03)
UROBILINOGEN UR QL STRIP.AUTO: 1 EU/DL (ref 0.2–1)

## 2025-01-03 PROCEDURE — 81003 URINALYSIS AUTO W/O SCOPE: CPT

## 2025-01-03 PROCEDURE — 99283 EMERGENCY DEPT VISIT LOW MDM: CPT

## 2025-01-03 ASSESSMENT — ENCOUNTER SYMPTOMS
ABDOMINAL PAIN: 0
RHINORRHEA: 0
WHEEZING: 0
BACK PAIN: 1
SHORTNESS OF BREATH: 0
NAUSEA: 0
COUGH: 1
DIARRHEA: 0
VOMITING: 0
EYE DISCHARGE: 0
EYE REDNESS: 0
CONSTIPATION: 0
SORE THROAT: 0

## 2025-01-03 ASSESSMENT — PAIN SCALES - GENERAL: PAINLEVEL_OUTOF10: 7

## 2025-01-03 ASSESSMENT — LIFESTYLE VARIABLES
HOW MANY STANDARD DRINKS CONTAINING ALCOHOL DO YOU HAVE ON A TYPICAL DAY: PATIENT DOES NOT DRINK
HOW OFTEN DO YOU HAVE A DRINK CONTAINING ALCOHOL: NEVER

## 2025-01-03 ASSESSMENT — PAIN DESCRIPTION - FREQUENCY: FREQUENCY: CONTINUOUS

## 2025-01-03 ASSESSMENT — PAIN - FUNCTIONAL ASSESSMENT: PAIN_FUNCTIONAL_ASSESSMENT: 0-10

## 2025-01-03 ASSESSMENT — PAIN DESCRIPTION - DESCRIPTORS: DESCRIPTORS: CRAMPING

## 2025-01-03 ASSESSMENT — PAIN DESCRIPTION - ORIENTATION: ORIENTATION: LOWER

## 2025-01-03 ASSESSMENT — PAIN DESCRIPTION - LOCATION: LOCATION: BACK

## 2025-01-03 NOTE — ED TRIAGE NOTES
Patient presents to ER with c/o lower back pain that started about 4 days ago. Patient states that he was initially taking Tylenol but it is no longer helping.

## 2025-01-03 NOTE — DISCHARGE INSTRUCTIONS
Apply moist heat. Do stretching exercises. F/U with PCP if no improvement. Take Tylenol 1 gram every 6 hours as needed for pain.

## 2025-01-03 NOTE — ED PROVIDER NOTES
[facility] EMERGENCY DEPT  EMERGENCY DEPARTMENT ENCOUNTER      Pt Name: Andrea Mireles Jr  Pt Age: 89 y.o.  Sex: male   MRN: [id number]  CSN: [id number]   Birthdate 1/31/1930  Date of evaluation: 1/3/2025  Provider: LAKISHA HUITRON  Room: [address]  Time Dictated: 1:26 PM    Chief Complaint   Chief Complaint   Patient presents with    Back Pain       History of Present Illness   The history is provided by the patient and a relative. No  was used.       12:12 PM  89 y.o. male presents to the ED C/O RT low back pain. Patient reports that he was seen here on 12/23/2024 and was diagnosed with Rhinovirus. They prescribed Tessalon Perles which has helped some with the cough but he is still coughing hard enough that his RT lower back is now hurting. He saw his PCP on 12/26/2024 and they prescribed him Ceftin for a sinus infection which helped. He is here today because of the back pain. No fever, chills, SOB, difficulty breathing, wheezing, abdominal pain, N/V/D/C, fecal/urinary incontinence, radiculopathy, paresthesias, dysuria, frequency, urgency, or hematuria.      Nursing Note reviewed    REVIEW OF SYSTEMS    (2-9 systems for level 4, 10 or more for level 5)   Review of Systems   Constitutional:  Negative for chills and fever.   HENT:  Negative for congestion, rhinorrhea and sore throat.    Eyes:  Negative for discharge and redness.   Respiratory:  Positive for cough. Negative for shortness of breath and wheezing.    Cardiovascular:  Negative for chest pain and palpitations.   Gastrointestinal:  Negative for abdominal pain, constipation, diarrhea, nausea and vomiting.   Genitourinary:  Negative for dysuria, frequency and urgency.   Musculoskeletal:  Positive for back pain. Negative for myalgias and neck pain.   Skin:  Negative for rash and wound.   Neurological:  Negative for dizziness and headaches.   Hematological:  Negative for adenopathy.   Psychiatric/Behavioral:  Negative for agitation and confusion.

## 2025-04-03 DIAGNOSIS — M47.812 CERVICAL SPONDYLOSIS WITHOUT MYELOPATHY: ICD-10-CM

## 2025-04-03 DIAGNOSIS — M43.12 SPONDYLOLISTHESIS, CERVICAL REGION: ICD-10-CM

## 2025-04-03 DIAGNOSIS — M47.816 FACET ARTHROPATHY, LUMBAR: ICD-10-CM

## 2025-04-03 DIAGNOSIS — M50.30 DDD (DEGENERATIVE DISC DISEASE), CERVICAL: ICD-10-CM

## 2025-04-03 DIAGNOSIS — M51.369 DDD (DEGENERATIVE DISC DISEASE), LUMBAR: ICD-10-CM

## 2025-04-03 DIAGNOSIS — M54.6 PAIN IN THORACIC SPINE: ICD-10-CM

## 2025-04-03 RX ORDER — GABAPENTIN 300 MG/1
300 CAPSULE ORAL 3 TIMES DAILY
Qty: 270 CAPSULE | Refills: 1 | Status: SHIPPED | OUTPATIENT
Start: 2025-04-03 | End: 2025-09-30

## 2025-04-03 NOTE — TELEPHONE ENCOUNTER
Refill request via fax     Medication: gabapentin (NEURONTIN) 300 MG capsule   Quantity: 270  Pharmacy: [facility] - [place] [address] - P [phone] - F [phone] [[id number]]   Last Fill: 10/25/2025    PCP: Jw Zee MD    LAST OFFICE VISIT: 10/10/2024      Future Appointments   Date Time Provider Department Center   4/10/2025  8:45 AM IOC LAB VISIT [facility] DEP   4/17/2025 11:20 AM Jw Zee MD [facility] DEP   4/25/2025  1:00 PM HBV FAST TRACK NURSE [facility]   9/15/2025 10:00 AM Matthias Feng MD [facility] BS AMB

## 2025-04-09 NOTE — PROGRESS NOTES
89 y.o. male who presents for evaluation.  Daughter Gricel was present for the evaluation     Continues to see Dr Scales's group  for his spine issues, armando continues to control sx    Saw Dr Mendez/vasc 2023 for venous disease and wears compression socks with good results, seeing prn now    No cardiovascular complaints.  He does adls, no set exercise, lots of chores at home.  He sees Dr Feng usually in summer/fall    He gets prolia through NP Burt and scheduled for next week    Denied any gi complaints, he has nocturia 1-2/night    *LAST MEDICARE WELLNESS EXAM: 10/10/24    Past Medical History:   Diagnosis Date    Anemia 2020    B12 deficiency     Closed compression fracture of L4 lumbar vertebra     Colon polyps     Dr Woods    Degenerative arthritis of cervical spine 12/2020    xray showed mod/sev discogenic degen changes C5-T1, asymmetric L facet hypertrophy C3-5    Degenerative arthritis of lumbar spine     MRI 11/14 w multilevel disease; chronic lbp w sciatica saw Dr Ronquillo    Diverticulosis     on CT 4/16    ED (erectile dysfunction)     H/O echocardiogram 07/2024    Dr Feng; ef 50-55%, mild FABIOLA, no wma, mod TR, pap 44 (7/24)    Nephrolithiasis 04/2016    right tiny stone on CT; microhematuria    Neuropathy 10/2022    Osteoporosis     ibandronate 2009-5/13 stopped due to tooth issues per his endodontist;  DEXA t score -2.4 spibe, -2.1 hip (8/11); -2.3 spine, -1.9 hip (8/13); -2.4 spine, -2.4 hip (9/15); -0.5 wrist, -2.2 hip (3/22); prolia 4/22- NP Flaco    Proctalgia fugax     PVC (premature ventricular contraction) 07/2024    holter showed 20% pvcs    RBBB (right bundle branch block) 2010    and bradycardia; saw Dr Feng (5/24)    Right inguinal hernia     Dr Trevizo    Tinnitus     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

## 2025-04-10 ENCOUNTER — HOSPITAL ENCOUNTER (OUTPATIENT)
Facility: HOSPITAL | Age: 89
Setting detail: SPECIMEN
Discharge: HOME | End: 2025-04-13
Payer: MEDICARE

## 2025-04-10 DIAGNOSIS — R73.09 ELEVATED GLUCOSE LEVEL: ICD-10-CM

## 2025-04-10 LAB
ALBUMIN SERPL-MCNC: 3.3 G/DL (ref 3.4–5)
ALBUMIN/GLOB SERPL: 1 (ref 0.8–1.7)
ALP SERPL-CCNC: 75 U/L (ref 45–117)
ALT SERPL-CCNC: 19 U/L (ref 16–61)
ANION GAP SERPL CALC-SCNC: 4 MMOL/L (ref 3–18)
AST SERPL-CCNC: 20 U/L (ref 10–38)
BASOPHILS # BLD: 0.02 K/UL (ref 0–0.1)
BASOPHILS NFR BLD: 0.3 % (ref 0–2)
BILIRUB SERPL-MCNC: 0.4 MG/DL (ref 0.2–1)
BUN SERPL-MCNC: 20 MG/DL (ref 7–18)
BUN/CREAT SERPL: 18 (ref 12–20)
CALCIUM SERPL-MCNC: 9.1 MG/DL (ref 8.5–10.1)
CHLORIDE SERPL-SCNC: 109 MMOL/L (ref 100–111)
CHOLEST SERPL-MCNC: 113 MG/DL
CO2 SERPL-SCNC: 28 MMOL/L (ref 21–32)
CREAT SERPL-MCNC: 1.1 MG/DL (ref 0.6–1.3)
DIFFERENTIAL METHOD BLD: ABNORMAL
EOSINOPHIL # BLD: 0.1 K/UL (ref 0–0.4)
EOSINOPHIL NFR BLD: 1.7 % (ref 0–5)
ERYTHROCYTE [DISTWIDTH] IN BLOOD BY AUTOMATED COUNT: 14.1 % (ref 11.6–14.5)
GLOBULIN SER CALC-MCNC: 3.2 G/DL (ref 2–4)
GLUCOSE SERPL-MCNC: 60 MG/DL (ref 74–99)
HBA1C MFR BLD: 5.1 % (ref 4.2–5.6)
HCT VFR BLD AUTO: 40.2 % (ref 36–48)
HDLC SERPL-MCNC: 65 MG/DL (ref 40–60)
HDLC SERPL: 1.7 (ref 0–5)
HGB BLD-MCNC: 12.8 G/DL (ref 13–16)
IMM GRANULOCYTES # BLD AUTO: 0.05 K/UL (ref 0–0.04)
IMM GRANULOCYTES NFR BLD AUTO: 0.9 % (ref 0–0.5)
LDLC SERPL CALC-MCNC: 34.8 MG/DL (ref 0–100)
LIPID PANEL: ABNORMAL
LYMPHOCYTES # BLD: 2.39 K/UL (ref 0.9–3.6)
LYMPHOCYTES NFR BLD: 41.5 % (ref 21–52)
MCH RBC QN AUTO: 31.4 PG (ref 24–34)
MCHC RBC AUTO-ENTMCNC: 31.8 G/DL (ref 31–37)
MCV RBC AUTO: 98.8 FL (ref 78–100)
MONOCYTES # BLD: 0.67 K/UL (ref 0.05–1.2)
MONOCYTES NFR BLD: 11.6 % (ref 3–10)
NEUTS SEG # BLD: 2.53 K/UL (ref 1.8–8)
NEUTS SEG NFR BLD: 44 % (ref 40–73)
NRBC # BLD: 0 K/UL (ref 0–0.01)
NRBC BLD-RTO: 0 PER 100 WBC
PLATELET # BLD AUTO: 194 K/UL (ref 135–420)
PMV BLD AUTO: 10.2 FL (ref 9.2–11.8)
POTASSIUM SERPL-SCNC: 4.5 MMOL/L (ref 3.5–5.5)
PROT SERPL-MCNC: 6.5 G/DL (ref 6.4–8.2)
RBC # BLD AUTO: 4.07 M/UL (ref 4.35–5.65)
SODIUM SERPL-SCNC: 141 MMOL/L (ref 136–145)
TRIGL SERPL-MCNC: 66 MG/DL
VLDLC SERPL CALC-MCNC: 13.2 MG/DL
WBC # BLD AUTO: 5.8 K/UL (ref 4.6–13.2)

## 2025-04-10 PROCEDURE — 80061 LIPID PANEL: CPT

## 2025-04-10 PROCEDURE — 83036 HEMOGLOBIN GLYCOSYLATED A1C: CPT

## 2025-04-10 PROCEDURE — 80053 COMPREHEN METABOLIC PANEL: CPT

## 2025-04-10 PROCEDURE — 85025 COMPLETE CBC W/AUTO DIFF WBC: CPT

## 2025-04-10 PROCEDURE — 36415 COLL VENOUS BLD VENIPUNCTURE: CPT

## 2025-04-17 ENCOUNTER — OFFICE VISIT (OUTPATIENT)
Facility: CLINIC | Age: 89
End: 2025-04-17
Payer: MEDICARE

## 2025-04-17 VITALS
RESPIRATION RATE: 16 BRPM | SYSTOLIC BLOOD PRESSURE: 132 MMHG | HEIGHT: 71 IN | DIASTOLIC BLOOD PRESSURE: 76 MMHG | BODY MASS INDEX: 25.34 KG/M2 | HEART RATE: 62 BPM | WEIGHT: 181 LBS | TEMPERATURE: 98 F | OXYGEN SATURATION: 97 %

## 2025-04-17 DIAGNOSIS — D64.9 ANEMIA, UNSPECIFIED TYPE: ICD-10-CM

## 2025-04-17 DIAGNOSIS — M81.0 OSTEOPOROSIS, UNSPECIFIED OSTEOPOROSIS TYPE, UNSPECIFIED PATHOLOGICAL FRACTURE PRESENCE: ICD-10-CM

## 2025-04-17 DIAGNOSIS — K21.9 GASTROESOPHAGEAL REFLUX DISEASE WITHOUT ESOPHAGITIS: ICD-10-CM

## 2025-04-17 DIAGNOSIS — M54.42 LOW BACK PAIN WITH LEFT-SIDED SCIATICA, UNSPECIFIED BACK PAIN LATERALITY, UNSPECIFIED CHRONICITY: ICD-10-CM

## 2025-04-17 DIAGNOSIS — I83.893 VARICOSE VEINS OF BOTH LEGS WITH EDEMA: Primary | ICD-10-CM

## 2025-04-17 PROCEDURE — 1125F AMNT PAIN NOTED PAIN PRSNT: CPT | Performed by: INTERNAL MEDICINE

## 2025-04-17 PROCEDURE — G8427 DOCREV CUR MEDS BY ELIG CLIN: HCPCS | Performed by: INTERNAL MEDICINE

## 2025-04-17 PROCEDURE — 99214 OFFICE O/P EST MOD 30 MIN: CPT | Performed by: INTERNAL MEDICINE

## 2025-04-17 PROCEDURE — 1123F ACP DISCUSS/DSCN MKR DOCD: CPT | Performed by: INTERNAL MEDICINE

## 2025-04-17 PROCEDURE — G2211 COMPLEX E/M VISIT ADD ON: HCPCS | Performed by: INTERNAL MEDICINE

## 2025-04-17 PROCEDURE — 1036F TOBACCO NON-USER: CPT | Performed by: INTERNAL MEDICINE

## 2025-04-17 PROCEDURE — 1159F MED LIST DOCD IN RCRD: CPT | Performed by: INTERNAL MEDICINE

## 2025-04-17 PROCEDURE — G8419 CALC BMI OUT NRM PARAM NOF/U: HCPCS | Performed by: INTERNAL MEDICINE

## 2025-04-17 SDOH — ECONOMIC STABILITY: FOOD INSECURITY: WITHIN THE PAST 12 MONTHS, YOU WORRIED THAT YOUR FOOD WOULD RUN OUT BEFORE YOU GOT MONEY TO BUY MORE.: NEVER TRUE

## 2025-04-17 SDOH — ECONOMIC STABILITY: FOOD INSECURITY: WITHIN THE PAST 12 MONTHS, THE FOOD YOU BOUGHT JUST DIDN'T LAST AND YOU DIDN'T HAVE MONEY TO GET MORE.: NEVER TRUE

## 2025-04-17 ASSESSMENT — PATIENT HEALTH QUESTIONNAIRE - PHQ9
SUM OF ALL RESPONSES TO PHQ QUESTIONS 1-9: 0
2. FEELING DOWN, DEPRESSED OR HOPELESS: NOT AT ALL
1. LITTLE INTEREST OR PLEASURE IN DOING THINGS: NOT AT ALL
SUM OF ALL RESPONSES TO PHQ QUESTIONS 1-9: 0

## 2025-04-17 NOTE — PROGRESS NOTES
Andrea Mireles Jr presents today for   Chief Complaint   Patient presents with    4 Month Follow-Up       \"Have you been to the ER, urgent care clinic since your last visit?  Hospitalized since your last visit?\"    YES - When: approximately 3 months ago.  Where and Why: [facility] ED, low back pain.    “Have you seen or consulted any other health care providers outside of [facility] since your last visit?”    NO

## 2025-04-18 PROBLEM — Z79.899 ENCOUNTER FOR MEDICATION MANAGEMENT: Status: ACTIVE | Noted: 2025-04-18

## 2025-04-25 ENCOUNTER — HOSPITAL ENCOUNTER (OUTPATIENT)
Facility: HOSPITAL | Age: 89
Setting detail: INFUSION SERIES
Discharge: HOME | End: 2025-04-28
Payer: MEDICARE

## 2025-04-25 VITALS
HEART RATE: 66 BPM | OXYGEN SATURATION: 95 % | DIASTOLIC BLOOD PRESSURE: 57 MMHG | TEMPERATURE: 98 F | SYSTOLIC BLOOD PRESSURE: 107 MMHG | RESPIRATION RATE: 16 BRPM

## 2025-04-25 DIAGNOSIS — Z79.60 ENCOUNTER FOR MONITORING IMMUNOMODULATING THERAPY: Primary | ICD-10-CM

## 2025-04-25 DIAGNOSIS — M81.0 OSTEOPOROSIS, UNSPECIFIED OSTEOPOROSIS TYPE, UNSPECIFIED PATHOLOGICAL FRACTURE PRESENCE: ICD-10-CM

## 2025-04-25 DIAGNOSIS — Z51.81 ENCOUNTER FOR MONITORING IMMUNOMODULATING THERAPY: Primary | ICD-10-CM

## 2025-04-25 DIAGNOSIS — Z79.899 ENCOUNTER FOR MEDICATION MANAGEMENT: ICD-10-CM

## 2025-04-25 LAB
MAGNESIUM SERPL-MCNC: 2.2 MG/DL (ref 1.6–2.6)
PHOSPHATE SERPL-MCNC: 3.2 MG/DL (ref 2.5–4.9)

## 2025-04-25 PROCEDURE — 84100 ASSAY OF PHOSPHORUS: CPT

## 2025-04-25 PROCEDURE — 83735 ASSAY OF MAGNESIUM: CPT

## 2025-04-25 PROCEDURE — 96372 THER/PROPH/DIAG INJ SC/IM: CPT

## 2025-04-25 PROCEDURE — 36415 COLL VENOUS BLD VENIPUNCTURE: CPT

## 2025-04-25 PROCEDURE — 6360000002 HC RX W HCPCS: Performed by: NURSE PRACTITIONER

## 2025-04-25 RX ORDER — SODIUM CHLORIDE 9 MG/ML
INJECTION, SOLUTION INTRAVENOUS CONTINUOUS
OUTPATIENT
Start: 2025-10-19

## 2025-04-25 RX ORDER — ONDANSETRON 2 MG/ML
8 INJECTION INTRAMUSCULAR; INTRAVENOUS
OUTPATIENT
Start: 2025-10-19

## 2025-04-25 RX ORDER — ALBUTEROL SULFATE 90 UG/1
4 INHALANT RESPIRATORY (INHALATION) PRN
OUTPATIENT
Start: 2025-10-19

## 2025-04-25 RX ORDER — DIPHENHYDRAMINE HYDROCHLORIDE 50 MG/ML
50 INJECTION, SOLUTION INTRAMUSCULAR; INTRAVENOUS
OUTPATIENT
Start: 2025-10-19

## 2025-04-25 RX ORDER — EPINEPHRINE 1 MG/ML
0.3 INJECTION, SOLUTION, CONCENTRATE INTRAVENOUS PRN
OUTPATIENT
Start: 2025-10-19

## 2025-04-25 RX ORDER — ACETAMINOPHEN 325 MG/1
650 TABLET ORAL
OUTPATIENT
Start: 2025-10-19

## 2025-04-25 RX ADMIN — DENOSUMAB 60 MG: 60 INJECTION SUBCUTANEOUS at 14:08

## 2025-04-25 ASSESSMENT — PAIN - FUNCTIONAL ASSESSMENT: PAIN_FUNCTIONAL_ASSESSMENT: NONE - DENIES PAIN

## 2025-04-25 NOTE — PROGRESS NOTES
[facility] Progress Note    Date: 2025    Name: Andrea Mireles Jr    MRN: [id number]         : 1930    Mr. Aline Lim arrived in the [facility] today at 1355, ambulatory and in stable condition, here for his PROLIA INJECTION (EVERY 6 MONTHS). He was assessed and education was provided.     Mr. Aline Lim's vitals were reviewed.  Vitals:    25 1355   BP: (!) 107/57   Pulse: 66   Resp: 16   Temp: 98 °F (36.7 °C)   SpO2: 95%         Mr. Mireles presented to the infusion center today stating that he was doing well and voicing no major complaints or concerns.     And, he stated that he has tolerated all past PROLIA injections well and without any side effects or issues.             HIS PRE-PROLIA LABS FROM 4-10-25 WERE REVIEWED AND WERE NOTED TO BE SATISFACTORY FOR TREATMENT TODAY,  AND WERE AS FOLLOWS:    HOWEVER, THE REQUIRED MAGNESIUM & PHOSPHORUS RESULTS WERE MISSING.     So, per pharmacy policy, must draw the ordered Magnesium & Phosphorus levels today, but do NOT have to wait for results prior to treatment today.      Latest Reference Range & Units 04/10/25 08:50   Sodium 136 - 145 mmol/L 141   Potassium 3.5 - 5.5 mmol/L 4.5   Chloride 100 - 111 mmol/L 109   CARBON DIOXIDE 21 - 32 mmol/L 28   BUN,BUNPL 7.0 - 18 MG/DL 20 (H)   Creatinine 0.6 - 1.3 MG/DL 1.10   Bun/Cre 12 - 20   18   Anion Gap 3.0 - 18 mmol/L 4   Est, Glom Filt Rate >60 ml/min/1.73m2 62   Glucose 74 - 99 mg/dL 60 (L)   Calcium 8.5 - 10.1 MG/DL 9.1   Albumin/Globulin Ratio 0.8 - 1.7   1.0   Total Protein 6.4 - 8.2 g/dL 6.5   LIPID PANEL -       (H): Data is abnormally high  (L): Data is abnormally low        Blood for the ordered labs (MAGNESIUM & PHOSPHORUS) was drawn from his RIGHT AC at 1415 without incident and was sent out by  to the main hospital lab for processing.         Denosumab (PROLIA) 60 mg, was administered SQ, in the back of his RIGHT ARM at 1408, per order and without incident.             Mr. Aline Lim tolerated well and voiced

## 2025-07-28 ENCOUNTER — PATIENT MESSAGE (OUTPATIENT)
Facility: CLINIC | Age: 89
End: 2025-07-28

## 2025-07-28 NOTE — TELEPHONE ENCOUNTER
Called and spoke with Pt. He states pain comes and goes onset of pain was last night then stopped. When he woke up this morning the pain started again then stopped. Gave Pt providers message that there were no available appointments and recommended to be evaluated at the urgent care or ER. Pt states he will see what happens and how he feels.